# Patient Record
Sex: FEMALE
[De-identification: names, ages, dates, MRNs, and addresses within clinical notes are randomized per-mention and may not be internally consistent; named-entity substitution may affect disease eponyms.]

---

## 2018-08-21 ENCOUNTER — BH HISTORICAL (OUTPATIENT)
Dept: OTHER | Age: 53
End: 2018-08-21

## 2018-08-26 ENCOUNTER — BH HISTORICAL (OUTPATIENT)
Dept: OTHER | Age: 53
End: 2018-08-26

## 2018-08-30 ENCOUNTER — BH HISTORICAL (OUTPATIENT)
Dept: OTHER | Age: 53
End: 2018-08-30

## 2018-09-17 ENCOUNTER — BH HISTORICAL (OUTPATIENT)
Dept: OTHER | Age: 53
End: 2018-09-17

## 2018-10-11 PROBLEM — K76.0 FATTY LIVER: Status: ACTIVE | Noted: 2017-06-15

## 2018-12-10 PROCEDURE — 85705 THROMBOPLASTIN INHIBITION: CPT | Performed by: FAMILY MEDICINE

## 2018-12-10 PROCEDURE — 85732 THROMBOPLASTIN TIME PARTIAL: CPT | Performed by: FAMILY MEDICINE

## 2018-12-10 PROCEDURE — 85610 PROTHROMBIN TIME: CPT | Performed by: FAMILY MEDICINE

## 2018-12-10 PROCEDURE — 85613 RUSSELL VIPER VENOM DILUTED: CPT | Performed by: FAMILY MEDICINE

## 2019-10-01 PROCEDURE — 86803 HEPATITIS C AB TEST: CPT | Performed by: FAMILY MEDICINE

## 2020-10-05 PROBLEM — F42.2 MIXED OBSESSIONAL THOUGHTS AND ACTS: Status: ACTIVE | Noted: 2020-10-05

## 2020-10-05 PROBLEM — F33.1 MODERATE EPISODE OF RECURRENT MAJOR DEPRESSIVE DISORDER (HCC): Status: ACTIVE | Noted: 2020-10-05

## 2021-02-08 PROBLEM — F42.9 OBSESSIVE-COMPULSIVE DISORDER: Status: ACTIVE | Noted: 2021-02-08

## 2022-02-10 PROBLEM — E11.59 TYPE 2 DIABETES MELLITUS WITH OTHER CIRCULATORY COMPLICATION, WITHOUT LONG-TERM CURRENT USE OF INSULIN (HCC): Status: ACTIVE | Noted: 2022-02-10

## 2024-05-20 ENCOUNTER — OFFICE VISIT (OUTPATIENT)
Dept: HEMATOLOGY/ONCOLOGY | Age: 59
End: 2024-05-20
Attending: INTERNAL MEDICINE

## 2024-05-20 VITALS
BODY MASS INDEX: 29.9 KG/M2 | WEIGHT: 166.63 LBS | SYSTOLIC BLOOD PRESSURE: 141 MMHG | OXYGEN SATURATION: 97 % | HEIGHT: 62.68 IN | RESPIRATION RATE: 18 BRPM | DIASTOLIC BLOOD PRESSURE: 85 MMHG | TEMPERATURE: 98 F | HEART RATE: 84 BPM

## 2024-05-20 DIAGNOSIS — E61.1 IRON DEFICIENCY: ICD-10-CM

## 2024-05-20 DIAGNOSIS — T50.905A ADVERSE EFFECT OF DRUG, INITIAL ENCOUNTER: ICD-10-CM

## 2024-05-20 DIAGNOSIS — C92.10 CML (CHRONIC MYELOID LEUKEMIA) (HCC): Primary | ICD-10-CM

## 2024-05-20 LAB
ALBUMIN SERPL-MCNC: 4.2 G/DL (ref 3.4–5)
ALBUMIN/GLOB SERPL: 1.1 {RATIO} (ref 1–2)
ALP LIVER SERPL-CCNC: 90 U/L
ALT SERPL-CCNC: 55 U/L
ANION GAP SERPL CALC-SCNC: 3 MMOL/L (ref 0–18)
AST SERPL-CCNC: 44 U/L (ref 15–37)
BASOPHILS # BLD AUTO: 0.05 X10(3) UL (ref 0–0.2)
BASOPHILS NFR BLD AUTO: 0.5 %
BILIRUB SERPL-MCNC: 0.9 MG/DL (ref 0.1–2)
BUN BLD-MCNC: 21 MG/DL (ref 9–23)
CALCIUM BLD-MCNC: 10.1 MG/DL (ref 8.5–10.1)
CHLORIDE SERPL-SCNC: 90 MMOL/L (ref 98–112)
CO2 SERPL-SCNC: 31 MMOL/L (ref 21–32)
CREAT BLD-MCNC: 0.76 MG/DL
DEPRECATED HBV CORE AB SER IA-ACNC: 66.4 NG/ML
EGFRCR SERPLBLD CKD-EPI 2021: 90 ML/MIN/1.73M2 (ref 60–?)
EOSINOPHIL # BLD AUTO: 0.18 X10(3) UL (ref 0–0.7)
EOSINOPHIL NFR BLD AUTO: 2 %
ERYTHROCYTE [DISTWIDTH] IN BLOOD BY AUTOMATED COUNT: 14.3 %
FASTING STATUS PATIENT QL REPORTED: NO
GLOBULIN PLAS-MCNC: 3.9 G/DL (ref 2.8–4.4)
GLUCOSE BLD-MCNC: 109 MG/DL (ref 70–99)
HCT VFR BLD AUTO: 48.9 %
HGB BLD-MCNC: 16.7 G/DL
IMM GRANULOCYTES # BLD AUTO: 0.03 X10(3) UL (ref 0–1)
IMM GRANULOCYTES NFR BLD: 0.3 %
IRON SATN MFR SERPL: 39 %
IRON SERPL-MCNC: 189 UG/DL
LDH SERPL L TO P-CCNC: 198 U/L
LYMPHOCYTES # BLD AUTO: 1.66 X10(3) UL (ref 1–4)
LYMPHOCYTES NFR BLD AUTO: 18.1 %
MCH RBC QN AUTO: 30.9 PG (ref 26–34)
MCHC RBC AUTO-ENTMCNC: 34.2 G/DL (ref 31–37)
MCV RBC AUTO: 90.4 FL
MONOCYTES # BLD AUTO: 0.72 X10(3) UL (ref 0.1–1)
MONOCYTES NFR BLD AUTO: 7.9 %
NEUTROPHILS # BLD AUTO: 6.53 X10 (3) UL (ref 1.5–7.7)
NEUTROPHILS # BLD AUTO: 6.53 X10(3) UL (ref 1.5–7.7)
NEUTROPHILS NFR BLD AUTO: 71.2 %
OSMOLALITY SERPL CALC.SUM OF ELEC: 262 MOSM/KG (ref 275–295)
PLATELET # BLD AUTO: 315 10(3)UL (ref 150–450)
POTASSIUM SERPL-SCNC: 5.1 MMOL/L (ref 3.5–5.1)
PROT SERPL-MCNC: 8.1 G/DL (ref 6.4–8.2)
RBC # BLD AUTO: 5.41 X10(6)UL
SODIUM SERPL-SCNC: 124 MMOL/L (ref 136–145)
TIBC SERPL-MCNC: 486 UG/DL (ref 240–450)
TRANSFERRIN SERPL-MCNC: 326 MG/DL (ref 200–360)
WBC # BLD AUTO: 9.2 X10(3) UL (ref 4–11)

## 2024-05-20 PROCEDURE — 99215 OFFICE O/P EST HI 40 MIN: CPT | Performed by: INTERNAL MEDICINE

## 2024-05-20 RX ORDER — CARVEDILOL 3.12 MG/1
3.12 TABLET ORAL 2 TIMES DAILY WITH MEALS
COMMUNITY
Start: 2024-03-23

## 2024-05-20 RX ORDER — SPIRONOLACTONE AND HYDROCHLOROTHIAZIDE 25; 25 MG/1; MG/1
1 TABLET ORAL
COMMUNITY
Start: 2024-05-08

## 2024-05-20 RX ORDER — ASPIRIN 81 MG/1
81 TABLET ORAL DAILY
COMMUNITY

## 2024-05-20 RX ORDER — ATORVASTATIN CALCIUM 80 MG/1
80 TABLET, FILM COATED ORAL NIGHTLY
COMMUNITY
Start: 2024-03-02

## 2024-05-20 RX ORDER — HYDRALAZINE HYDROCHLORIDE 25 MG/1
TABLET, FILM COATED ORAL
COMMUNITY
Start: 2024-04-09

## 2024-05-20 RX ORDER — FLUOXETINE 20 MG/1
20 TABLET, FILM COATED ORAL DAILY
COMMUNITY
Start: 2024-05-12

## 2024-05-20 NOTE — PROGRESS NOTES
Education Record    Learner:  Patient/ spouse    Disease / Diagnosis: CML    Barriers / Limitations:  None   Comments:    Method:  Discussion   Comments:    General Topics:  Plan of care reviewed   Comments:    Outcome:  Shows understanding   Comments:   Pt dx in 2021 at [facility]. MD retired.   Was started on Bosutinib, and in remission within 9 month, stopped treatment 11/2023 due to arrhthymias.  Cardiology has said her heart is fine.   Has not resumed treatment.

## 2024-05-20 NOTE — CONSULTS
Cancer Center Report of Consultation    Patient Name: Dyana Souza   YOB: 1965   Medical Record Number: [id number]   CSN: [id number]   Consulting Physician: Ryann Ordonez MD  Referring Physician(s): No ref. provider found  Date of Consultation: 5/20/2024     Reason for Consultation:  Dyana Souza was seen today in the Cancer Center for the diagnosis of CLL.    History of Present Illness:     59 year old previously followed at [facility] for CLL, initially diagnosed in 2021.  Referral blood showed 62.49% BCR/ABL transcripts.  Started bosutinib at that time and achieved a major molecular response in 9/22.  In 11/23, she was found to have bradycardia and she stopped the bosutinib.  The bradycardia was attributed to Coreg.  She also had some a flutter.  Since then she has seen EP.  Holter test was okay.  She was admitted in 3/24 for control blood pressure.  She has been off bosutinib since then.      Past Medical History:  Past Medical History:    Achalasia    Anxiety    Back pain    Chronic fatigue syndrome    Completed stroke (HCC)    Coronary atherosclerosis of native coronary artery    Overview:  Cath by DANIEL Gregg 06/13/2007 for R/S CP with radiation to her back and jaw.  RCA stent (done in [place]) . Tubular LAD/Diag stent of 80% was treated with kissing balloon. LVEF 50%.   CABG 07/30/2007 had CABG x4 by Dr JAYLYN Maher. KIMBLE-LAD, SVG-OM, SVG-PDA and SVG-Diag. Admitted with atypical CP Jul 2015, Trop (-), ECHO LVEF  55% w/o WMA, MPI done Jan 2015 negative for ischemia.       Degenerative joint disease (DJD) of lumbar spine    Diabetes mellitus (HCC)    Edema    Essential hypertension    Fibromyalgia    GERD (gastroesophageal reflux disease)    Insomnia    Muscle spasm    Obesity    Obstructive sleep apnea    Old myocardial infarction    Snoring       Past Surgical History:  Past Surgical History:   Procedure Laterality Date    Cabg, arterial, four+      2007    Cholecystectomy      Hysterectomy      partial,  not due to cancer    Oophorectomy Right     noncancer,       Family Medical History:  Family History   Problem Relation Age of Onset    Stroke Brother     Other (lupus anticoagulant) Brother     Other (malignant neoplasm of pancrease) Paternal Grandfather        Psychosocial History:  Social History     Socioeconomic History    Marital status: OTHER     Spouse name: Not on file    Number of children: Not on file    Years of education: Not on file    Highest education level: Not on file   Occupational History    Not on file   Tobacco Use    Smoking status: Former     Current packs/day: 0.00     Types: Cigarettes     Quit date:      Years since quittin.3    Smokeless tobacco: Never   Vaping Use    Vaping status: Never Used   Substance and Sexual Activity    Alcohol use: No    Drug use: No    Sexual activity: Not on file   Other Topics Concern    Not on file   Social History Narrative    Not on file     Social Determinants of Health     Financial Resource Strain: Not on file   Food Insecurity: Not on file   Transportation Needs: Not on file   Physical Activity: Not on file   Stress: Not on file   Social Connections: Not on file   Housing Stability: Not on file     , homemaker, no children.  1 dog.    Allergies:   Allergies   Allergen Reactions    Amlodipine ANAPHYLAXIS    Clopidogrel WHEEZING and RASH    Metoclopramide HIVES and OTHER (SEE COMMENTS)     Cerner Allergy Text Annotation: Reglan, Cerner Reaction: hyper  Cerner Allergy Text Annotation: Reglan, Cerner Reaction: hyper      Mirtazapine Coughing, PALPITATIONS, Runny nose and SWELLING    Tramadol UNKNOWN    Aspirin ITCHING    Ciprofloxacin NAUSEA AND VOMITING and OTHER (SEE COMMENTS)     Cerner Allergy Text Annotation: ciprofloxacin, Cerner Reaction: vomit      Niacin And Related UNKNOWN     Other reaction(s): Rash    Opioid Analgesics OTHER (SEE COMMENTS), UNKNOWN and NAUSEA AND VOMITING     Per pt  Cerner Allergy Text Annotation: morphine,  Cerner Reaction: chest pain      Sulfamethoxazole W/Trimethoprim NAUSEA AND VOMITING    Amoxicillin-Pot Clavulanate OTHER (SEE COMMENTS)     Upset her stomach with Augmentin.  Patient took amoxicillin without problem.    Gabapentin OTHER (SEE COMMENTS)    Sertraline UNKNOWN     Cerner Allergy Text Annotation: Zoloft      Trazodone UNKNOWN    Sulfa Antibiotics OTHER (SEE COMMENTS) and RASH     Cerner Allergy Text Annotation: sulfa drugs, Cerner Reaction: hives         Current Medications:    Current Outpatient Medications:     FLUoxetine HCl 20 MG Oral Tab, Take 1 tablet (20 mg total) by mouth daily., Disp: , Rfl:     atorvastatin 80 MG Oral Tab, Take 1 tablet (80 mg total) by mouth nightly., Disp: , Rfl:     carvedilol 3.125 MG Oral Tab, Take 1 tablet (3.125 mg total) by mouth 2 (two) times daily with meals., Disp: , Rfl:     hydrALAZINE 25 MG Oral Tab, TAKE 3 TABLETS BY MOUTH EVERY 12 HOURS. INDICATIONS: HIGH BLOOD PRESSURE, Disp: , Rfl:     Spironolactone-HCTZ 25-25 MG Oral Tab, Take 1 tablet by mouth daily with breakfast., Disp: , Rfl:     DULoxetine 60 MG Oral Cap DR Particles, Take 1 capsule (60 mg total) by mouth daily., Disp: 90 capsule, Rfl: 1    prochlorperazine (COMPAZINE) 10 mg tablet, Take 1 tablet (10 mg total) by mouth every 6 (six) hours as needed., Disp: , Rfl:     Famotidine (PEPCID AC OR), Take 25 mg by mouth as needed., Disp: , Rfl:     NON FORMULARY, Medical cannabis as needed., Disp: , Rfl:     zolpidem 10 MG Oral Tab, Take 1 tablet (10 mg total) by mouth daily., Disp: 60 tablet, Rfl: 0    HYDROcodone-acetaminophen 5-325 MG Oral Tab, Take 1-2 tablets by mouth 3 (three) times daily as needed., Disp: 30 tablet, Rfl: 0    clonazePAM 1 MG Oral Tab, Take 1 tablet (1 mg total) by mouth 2 (two) times daily., Disp: 60 tablet, Rfl: 1    lisinopril 20 MG Oral Tab, Take 0.5 tablets (10 mg total) by mouth 2 (two) times daily., Disp: , Rfl: 3    Mometasone Furoate 0.1 % External Cream, Apply 1 Application  topically daily., Disp: 60 g, Rfl: 3    POTASSIUM CHLORIDE ER 20 MEQ Oral Tab CR, TAKE 1 TABLET BY MOUTH DAILY, Disp: 90 tablet, Rfl: 11    Triamcinolone Acetonide (NASACORT ALLERGY 24HR) 55 MCG/ACT Nasal Aerosol, 2 sprays in each nostril every day, Disp: 16.5 g, Rfl: 0    BOSULIF 400 MG Oral Tab, Take 400 mg by mouth daily. (Patient not taking: Reported on 5/20/2024), Disp: , Rfl:     Review of Systems:    Constitutional: No chills, fevers, malaise, night sweats and weight loss.   Eyes: No visual disturbance, irritation and redness.  Ears, nose, mouth, throat, and face: No hearing loss, tinnitus, hoarseness and voice change.  Respiratory: No cough, sputum, hemoptysis, chest pain, wheezing, dyspnea on exertion  Cardiovascular: No chest pain, palpitations, syncope,orthopnea, PND, edema  Gastrointestinal:No dysphagia, odynophagia, nausea, vomiting, diarrhea, abdominal pain.  Derm: No rash, skin lesions, and pruritus.  Hematologic/lymphatic: No easy bruising, bleeding, and lymphadenopathy.  Musculoskeletal: No myalgias, arthralgias, muscle weakness.  Neurological: No headaches, dizziness, seizures, speech problems, gait problems   Psych: No anxiety/depression.    Vital Signs:  /85 (BP Location: Left arm, Patient Position: Sitting, Cuff Size: adult)   Pulse 84   Temp 98.4 °F (36.9 °C) (Tympanic)   Resp 18   Ht 1.592 m (5' 2.68\")   Wt 75.6 kg (166 lb 9.6 oz)   LMP  (LMP Unknown)   SpO2 97%   BMI 29.82 kg/m²     ECOG PS: 1    Physical Examination:    General: Patient is alert and oriented x 3, not in acute distress.  HEENT: EOMs intact. PERRL. Oropharynx is clear.   Neck: No JVD. No palpable lymphadenopathy. Neck is supple.  Lymphatics: There is no palpable lymphadenopathy  in the cervical, axillary, or inguinal regions.  Chest: Clear to auscultation.  Heart: Regular rate and rhythm. S1S2 normal.  Abdomen: Soft, non tender with good bowel sounds.  No hepatosplenomegaly/mass.    Extremities: Pedal pulses are  present. No edema.  Neurological: Grossly intact.      Labs:         Assessment and Plan:    # CML: Diag 6/21.  62.49% BCR/ABL transcripts and blood.  Started bosutinib and had a very good response.  Achieved major molecular response in 9/22.  Self discontinued bosutinib in 11/23 and she had bradycardia and a flutter.  Since then she has met with cardiology.  Thought to have sick sinus syndrome.  Holter test was apparently okay.  Bradycardia was attributed to carvedilol which has been discontinued.  She is willing to start now.  Recommend baseline labs including BCR/ABL.  She has bosutinib at home.  Will order.  If she has side effects, can consider dose reduction.  Discussed that she is a candidate for indefinite treatment.  She was off treatment since 11/23 and BCR ABL was positive in 1/24.        Procedures    CBC With Differential With Platelet    Comp Metabolic Panel (14)    Iron And Tibc    LDH    Monoclonal Protein Study    BCR-ABL1, P210 Gene Rearrangement, Quantitative PCR Panel    FERRITIN [E]    CBC With Differential With Platelet    Comp Metabolic Panel (14)    Iron And Tibc    Ferritin    LDH    Uric Acid    BCR-ABL1, P210 Gene Rearrangement, Quantitative PCR Panel    SERUM MONOCLONAL PROTEIN STUDY    BCR-ABL1, P210 Gene Rearrangement, Quantitative PCR    CBC w/Auto Diff     Return in about 3 months (around 8/20/2024) for Labs, MD visit.      Janene Ordonez M.D.    [facility]    [address] [place], [address]    5/20/2024    1:48 PM

## 2024-05-22 ENCOUNTER — TELEPHONE (OUTPATIENT)
Dept: HEMATOLOGY/ONCOLOGY | Facility: HOSPITAL | Age: 59
End: 2024-05-22

## 2024-05-22 LAB
ALBUMIN SERPL ELPH-MCNC: 4.49 G/DL (ref 3.75–5.21)
ALBUMIN/GLOB SERPL: 1.49 {RATIO} (ref 1–2)
ALPHA1 GLOB SERPL ELPH-MCNC: 0.26 G/DL (ref 0.19–0.46)
ALPHA2 GLOB SERPL ELPH-MCNC: 0.99 G/DL (ref 0.48–1.05)
B-GLOBULIN SERPL ELPH-MCNC: 1 G/DL (ref 0.68–1.23)
GAMMA GLOB SERPL ELPH-MCNC: 0.77 G/DL (ref 0.62–1.7)
KAPPA LC FREE SER-MCNC: 1.95 MG/DL (ref 0.33–1.94)
KAPPA LC FREE/LAMBDA FREE SER NEPH: 1.38 {RATIO} (ref 0.26–1.65)
LAMBDA LC FREE SERPL-MCNC: 1.42 MG/DL (ref 0.57–2.63)
PROT SERPL-MCNC: 7.5 G/DL (ref 5.7–8.2)

## 2024-05-22 NOTE — TELEPHONE ENCOUNTER
Pt called about low BP-90/35. Just restarted Bosutinib 400 mg/ Not on Hydralazine or Spironolactone/hydrochlorothiazide. Cardiology increased her Lisinopril to 40 mg bid after her mini CVA. Yold pt to hold Bosutinib, increase fluids, eat something salty. Hold Lisinopril tomorrow (took 2 doses already today.) Call with BP update Fri and we can advise on Bosutinib then. She will prob have to contact cardiology to get meds adjusted.

## 2024-05-23 DIAGNOSIS — C92.10 CML (CHRONIC MYELOID LEUKEMIA) (HCC): ICD-10-CM

## 2024-05-24 LAB
BCR-ABL1 INTERPRETATION: DETECTED
NS MOLECULAR RESPONSE VALUE: 2.77

## 2024-05-29 DIAGNOSIS — I10 ESSENTIAL HYPERTENSION: Primary | ICD-10-CM

## 2024-05-29 RX ORDER — LISINOPRIL 20 MG/1
20 TABLET ORAL 2 TIMES DAILY
Qty: 60 TABLET | Refills: 0 | Status: SHIPPED | OUTPATIENT
Start: 2024-05-29

## 2024-06-14 ENCOUNTER — SOCIAL WORK SERVICES (OUTPATIENT)
Dept: HEMATOLOGY/ONCOLOGY | Facility: HOSPITAL | Age: 59
End: 2024-06-14

## 2024-06-14 NOTE — PROGRESS NOTES
Pt sent a "[employer]hart message expressing concerns of distress.     SW left a vm for pt ([phone]) discussing resources available to her. SW also sent a [employer] message to provide resources and SW contact information. SW provided cancer support resources including [facility]. The packet of resources included information on transportation resources, homecare/home health agencies, Facebook support group page and counseling resources. SW explained role of SW in Cancer Center and social work contact information.    Silvina Gutierrez [facility] [place] [address]  Ph: [phone] [email or web address]  Fax: [phone]

## 2024-08-05 ENCOUNTER — NURSE ONLY (OUTPATIENT)
Dept: HEMATOLOGY/ONCOLOGY | Age: 59
End: 2024-08-05
Attending: INTERNAL MEDICINE
Payer: MEDICARE

## 2024-08-05 DIAGNOSIS — C92.10 CML (CHRONIC MYELOID LEUKEMIA) (HCC): ICD-10-CM

## 2024-08-05 DIAGNOSIS — E61.1 IRON DEFICIENCY: ICD-10-CM

## 2024-08-05 LAB
ALBUMIN SERPL-MCNC: 3.3 G/DL (ref 3.4–5)
ALBUMIN/GLOB SERPL: 1 {RATIO} (ref 1–2)
ALP LIVER SERPL-CCNC: 97 U/L
ALT SERPL-CCNC: 59 U/L
ANION GAP SERPL CALC-SCNC: 6 MMOL/L (ref 0–18)
AST SERPL-CCNC: 43 U/L (ref 15–37)
BASOPHILS # BLD AUTO: 0.02 X10(3) UL (ref 0–0.2)
BASOPHILS NFR BLD AUTO: 0.3 %
BILIRUB SERPL-MCNC: 0.5 MG/DL (ref 0.1–2)
BUN BLD-MCNC: 10 MG/DL (ref 9–23)
CALCIUM BLD-MCNC: 9 MG/DL (ref 8.5–10.1)
CHLORIDE SERPL-SCNC: 103 MMOL/L (ref 98–112)
CO2 SERPL-SCNC: 26 MMOL/L (ref 21–32)
CREAT BLD-MCNC: 0.68 MG/DL
DEPRECATED HBV CORE AB SER IA-ACNC: 21.4 NG/ML
EGFRCR SERPLBLD CKD-EPI 2021: 100 ML/MIN/1.73M2 (ref 60–?)
EOSINOPHIL # BLD AUTO: 0.27 X10(3) UL (ref 0–0.7)
EOSINOPHIL NFR BLD AUTO: 3.6 %
ERYTHROCYTE [DISTWIDTH] IN BLOOD BY AUTOMATED COUNT: 14.2 %
GLOBULIN PLAS-MCNC: 3.4 G/DL (ref 2.8–4.4)
GLUCOSE BLD-MCNC: 106 MG/DL (ref 70–99)
HCT VFR BLD AUTO: 39.2 %
HGB BLD-MCNC: 13.3 G/DL
IMM GRANULOCYTES # BLD AUTO: 0.02 X10(3) UL (ref 0–1)
IMM GRANULOCYTES NFR BLD: 0.3 %
IRON SATN MFR SERPL: 14 %
IRON SERPL-MCNC: 46 UG/DL
LDH SERPL L TO P-CCNC: 228 U/L
LYMPHOCYTES # BLD AUTO: 0.82 X10(3) UL (ref 1–4)
LYMPHOCYTES NFR BLD AUTO: 11.1 %
MCH RBC QN AUTO: 31.4 PG (ref 26–34)
MCHC RBC AUTO-ENTMCNC: 33.9 G/DL (ref 31–37)
MCV RBC AUTO: 92.7 FL
MONOCYTES # BLD AUTO: 0.72 X10(3) UL (ref 0.1–1)
MONOCYTES NFR BLD AUTO: 9.7 %
NEUTROPHILS # BLD AUTO: 5.55 X10 (3) UL (ref 1.5–7.7)
NEUTROPHILS # BLD AUTO: 5.55 X10(3) UL (ref 1.5–7.7)
NEUTROPHILS NFR BLD AUTO: 75 %
OSMOLALITY SERPL CALC.SUM OF ELEC: 279 MOSM/KG (ref 275–295)
PLATELET # BLD AUTO: 226 10(3)UL (ref 150–450)
POTASSIUM SERPL-SCNC: 3.7 MMOL/L (ref 3.5–5.1)
PROT SERPL-MCNC: 6.7 G/DL (ref 6.4–8.2)
RBC # BLD AUTO: 4.23 X10(6)UL
SODIUM SERPL-SCNC: 135 MMOL/L (ref 136–145)
TOTAL IRON BINDING CAPACITY: 327 UG/DL (ref 250–425)
TRANSFERRIN SERPL-MCNC: 235 MG/DL (ref 250–380)
URATE SERPL-MCNC: 4 MG/DL
WBC # BLD AUTO: 7.4 X10(3) UL (ref 4–11)

## 2024-08-05 PROCEDURE — 84550 ASSAY OF BLOOD/URIC ACID: CPT

## 2024-08-05 PROCEDURE — 36415 COLL VENOUS BLD VENIPUNCTURE: CPT

## 2024-08-05 PROCEDURE — 80053 COMPREHEN METABOLIC PANEL: CPT

## 2024-08-05 PROCEDURE — 83550 IRON BINDING TEST: CPT

## 2024-08-05 PROCEDURE — 83615 LACTATE (LD) (LDH) ENZYME: CPT

## 2024-08-05 PROCEDURE — 83540 ASSAY OF IRON: CPT

## 2024-08-05 PROCEDURE — 82728 ASSAY OF FERRITIN: CPT

## 2024-08-05 PROCEDURE — 85025 COMPLETE CBC W/AUTO DIFF WBC: CPT

## 2024-08-13 ENCOUNTER — HOSPITAL ENCOUNTER (EMERGENCY)
Age: 59
Discharge: HOME | End: 2024-08-13
Attending: STUDENT IN AN ORGANIZED HEALTH CARE EDUCATION/TRAINING PROGRAM
Payer: MEDICARE

## 2024-08-13 ENCOUNTER — TELEPHONE (OUTPATIENT)
Dept: HEMATOLOGY/ONCOLOGY | Facility: HOSPITAL | Age: 59
End: 2024-08-13

## 2024-08-13 VITALS
HEIGHT: 63 IN | WEIGHT: 180 LBS | RESPIRATION RATE: 18 BRPM | SYSTOLIC BLOOD PRESSURE: 157 MMHG | TEMPERATURE: 99 F | BODY MASS INDEX: 31.89 KG/M2 | HEART RATE: 75 BPM | DIASTOLIC BLOOD PRESSURE: 77 MMHG | OXYGEN SATURATION: 97 %

## 2024-08-13 DIAGNOSIS — I10 HYPERTENSION, UNSPECIFIED TYPE: Primary | ICD-10-CM

## 2024-08-13 DIAGNOSIS — R19.7 DIARRHEA, UNSPECIFIED TYPE: ICD-10-CM

## 2024-08-13 LAB
ALBUMIN SERPL-MCNC: 3.4 G/DL (ref 3.4–5)
ALBUMIN/GLOB SERPL: 0.9 {RATIO} (ref 1–2)
ALP LIVER SERPL-CCNC: 105 U/L
ALT SERPL-CCNC: 58 U/L
ANION GAP SERPL CALC-SCNC: 6 MMOL/L (ref 0–18)
AST SERPL-CCNC: 38 U/L (ref 15–37)
ATRIAL RATE: 72 BPM
BASOPHILS # BLD AUTO: 0.01 X10(3) UL (ref 0–0.2)
BASOPHILS NFR BLD AUTO: 0.1 %
BILIRUB SERPL-MCNC: 0.6 MG/DL (ref 0.1–2)
BILIRUB UR QL STRIP.AUTO: NEGATIVE
BUN BLD-MCNC: 6 MG/DL (ref 9–23)
CALCIUM BLD-MCNC: 9.2 MG/DL (ref 8.5–10.1)
CHLORIDE SERPL-SCNC: 105 MMOL/L (ref 98–112)
CLARITY UR REFRACT.AUTO: CLEAR
CO2 SERPL-SCNC: 23 MMOL/L (ref 21–32)
COLOR UR AUTO: YELLOW
CREAT BLD-MCNC: 0.65 MG/DL
EGFRCR SERPLBLD CKD-EPI 2021: 101 ML/MIN/1.73M2 (ref 60–?)
EOSINOPHIL # BLD AUTO: 0.15 X10(3) UL (ref 0–0.7)
EOSINOPHIL NFR BLD AUTO: 2.1 %
ERYTHROCYTE [DISTWIDTH] IN BLOOD BY AUTOMATED COUNT: 13.9 %
GLOBULIN PLAS-MCNC: 3.8 G/DL (ref 2.8–4.4)
GLUCOSE BLD-MCNC: 101 MG/DL (ref 70–99)
GLUCOSE UR STRIP.AUTO-MCNC: NEGATIVE MG/DL
HCT VFR BLD AUTO: 41.2 %
HGB BLD-MCNC: 13.7 G/DL
IMM GRANULOCYTES # BLD AUTO: 0.02 X10(3) UL (ref 0–1)
IMM GRANULOCYTES NFR BLD: 0.3 %
KETONES UR STRIP.AUTO-MCNC: NEGATIVE MG/DL
LEUKOCYTE ESTERASE UR QL STRIP.AUTO: NEGATIVE
LYMPHOCYTES # BLD AUTO: 0.52 X10(3) UL (ref 1–4)
LYMPHOCYTES NFR BLD AUTO: 7.3 %
MCH RBC QN AUTO: 30.9 PG (ref 26–34)
MCHC RBC AUTO-ENTMCNC: 33.3 G/DL (ref 31–37)
MCV RBC AUTO: 92.8 FL
MONOCYTES # BLD AUTO: 0.79 X10(3) UL (ref 0.1–1)
MONOCYTES NFR BLD AUTO: 11.1 %
NEUTROPHILS # BLD AUTO: 5.63 X10 (3) UL (ref 1.5–7.7)
NEUTROPHILS # BLD AUTO: 5.63 X10(3) UL (ref 1.5–7.7)
NEUTROPHILS NFR BLD AUTO: 79.1 %
NITRITE UR QL STRIP.AUTO: NEGATIVE
OSMOLALITY SERPL CALC.SUM OF ELEC: 276 MOSM/KG (ref 275–295)
P AXIS: 70 DEGREES
P-R INTERVAL: 142 MS
PH UR STRIP.AUTO: 6 [PH] (ref 5–8)
PLATELET # BLD AUTO: 216 10(3)UL (ref 150–450)
POTASSIUM SERPL-SCNC: 4.1 MMOL/L (ref 3.5–5.1)
PROT SERPL-MCNC: 7.2 G/DL (ref 6.4–8.2)
PROT UR STRIP.AUTO-MCNC: NEGATIVE MG/DL
Q-T INTERVAL: 424 MS
QRS DURATION: 92 MS
QTC CALCULATION (BEZET): 464 MS
R AXIS: 62 DEGREES
RBC # BLD AUTO: 4.44 X10(6)UL
RBC UR QL AUTO: NEGATIVE
SARS-COV-2 RNA RESP QL NAA+PROBE: NOT DETECTED
SODIUM SERPL-SCNC: 134 MMOL/L (ref 136–145)
SP GR UR STRIP.AUTO: 1.01 (ref 1–1.03)
T AXIS: 42 DEGREES
UROBILINOGEN UR STRIP.AUTO-MCNC: 0.2 MG/DL
VENTRICULAR RATE: 72 BPM
WBC # BLD AUTO: 7.1 X10(3) UL (ref 4–11)

## 2024-08-13 PROCEDURE — 80053 COMPREHEN METABOLIC PANEL: CPT | Performed by: STUDENT IN AN ORGANIZED HEALTH CARE EDUCATION/TRAINING PROGRAM

## 2024-08-13 PROCEDURE — 99285 EMERGENCY DEPT VISIT HI MDM: CPT

## 2024-08-13 PROCEDURE — 99284 EMERGENCY DEPT VISIT MOD MDM: CPT

## 2024-08-13 PROCEDURE — 81003 URINALYSIS AUTO W/O SCOPE: CPT | Performed by: STUDENT IN AN ORGANIZED HEALTH CARE EDUCATION/TRAINING PROGRAM

## 2024-08-13 PROCEDURE — 85025 COMPLETE CBC W/AUTO DIFF WBC: CPT | Performed by: STUDENT IN AN ORGANIZED HEALTH CARE EDUCATION/TRAINING PROGRAM

## 2024-08-13 PROCEDURE — 87507 IADNA-DNA/RNA PROBE TQ 12-25: CPT | Performed by: STUDENT IN AN ORGANIZED HEALTH CARE EDUCATION/TRAINING PROGRAM

## 2024-08-13 PROCEDURE — 87493 C DIFF AMPLIFIED PROBE: CPT | Performed by: STUDENT IN AN ORGANIZED HEALTH CARE EDUCATION/TRAINING PROGRAM

## 2024-08-13 PROCEDURE — 93005 ELECTROCARDIOGRAM TRACING: CPT

## 2024-08-13 PROCEDURE — 93010 ELECTROCARDIOGRAM REPORT: CPT

## 2024-08-13 PROCEDURE — 96375 TX/PRO/DX INJ NEW DRUG ADDON: CPT

## 2024-08-13 PROCEDURE — 96374 THER/PROPH/DIAG INJ IV PUSH: CPT

## 2024-08-13 PROCEDURE — 96376 TX/PRO/DX INJ SAME DRUG ADON: CPT

## 2024-08-13 PROCEDURE — 96361 HYDRATE IV INFUSION ADD-ON: CPT

## 2024-08-13 RX ORDER — DICYCLOMINE HCL 20 MG
20 TABLET ORAL ONCE
Status: COMPLETED | OUTPATIENT
Start: 2024-08-13 | End: 2024-08-13

## 2024-08-13 RX ORDER — ONDANSETRON 4 MG/1
4 TABLET, ORALLY DISINTEGRATING ORAL EVERY 4 HOURS PRN
Qty: 10 TABLET | Refills: 0 | Status: SHIPPED | OUTPATIENT
Start: 2024-08-13 | End: 2024-08-20

## 2024-08-13 RX ORDER — DICYCLOMINE HCL 20 MG
20 TABLET ORAL 4 TIMES DAILY PRN
Qty: 30 TABLET | Refills: 0 | Status: SHIPPED | OUTPATIENT
Start: 2024-08-13 | End: 2024-09-12

## 2024-08-13 RX ORDER — HYDRALAZINE HYDROCHLORIDE 20 MG/ML
5 INJECTION INTRAMUSCULAR; INTRAVENOUS ONCE
Status: COMPLETED | OUTPATIENT
Start: 2024-08-13 | End: 2024-08-13

## 2024-08-13 RX ORDER — ONDANSETRON 2 MG/ML
4 INJECTION INTRAMUSCULAR; INTRAVENOUS ONCE
Status: COMPLETED | OUTPATIENT
Start: 2024-08-13 | End: 2024-08-13

## 2024-08-13 NOTE — ED PROVIDER NOTES
History     Chief Complaint   Patient presents with    Abdomen/Flank Pain    Blood Pressure       HPI    59 year old female with history of CLL on oral chemo presents with a couple days of abdominal cramps, bloating, nonbloody diarrhea and nausea.  She also has full body heat rash like sensation.  This happens sometimes for patient as a chemo reaction..  she has associated dull headache and fatigue. she has noted that her blood pressure has been elevated as well, she has hydralazine at home that she takes as needed if her blood pressure is elevated. No cp/sob.          Past Medical History:    Achalasia    Anxiety    Back pain    Chronic fatigue syndrome    Completed stroke (HCC)    Coronary atherosclerosis of native coronary artery    Overview:  Cath by DANIEL Gregg 2007 for R/S CP with radiation to her back and jaw.  RCA stent (done in [place]) . Tubular LAD/Diag stent of 80% was treated with kissing balloon. LVEF 50%.   CABG 2007 had CABG x4 by Dr JAYLYN Maher. KIMBLE-LAD, SVG-OM, SVG-PDA and SVG-Diag. Admitted with atypical CP 2015, Trop (-), ECHO LVEF  55% w/o WMA, MPI done 2015 negative for ischemia.       Degenerative joint disease (DJD) of lumbar spine    Diabetes mellitus (HCC)    Diverticulitis    Edema    Essential hypertension    Fibromyalgia    GERD (gastroesophageal reflux disease)    Insomnia    Leukemia (HCC)    Muscle spasm    Obesity    Obstructive sleep apnea    Old myocardial infarction    Snoring       Past Surgical History:   Procedure Laterality Date    Appendectomy      Cabg, arterial, four+          Cholecystectomy      Colonoscopy      Hysterectomy      partial, not due to cancer    Oophorectomy Right     noncancer,       Social History     Socioeconomic History    Marital status:    Tobacco Use    Smoking status: Former     Current packs/day: 0.00     Types: Cigarettes     Quit date:      Years since quittin.6    Smokeless tobacco: Never   Vaping Use    Vaping  status: Never Used   Substance and Sexual Activity    Alcohol use: No     Comment: occ    Drug use: Yes     Types: Cannabis     Social Determinants of Health     Food Insecurity: No Food Insecurity (2/29/2024)    Received from [facility]    Food Insecurity     Currently or in the past 3 months, have you worried your food would run out before you had money to buy more?: No     In the past 12 months, have you run out of food or been unable to get more?: No   Transportation Needs: No Transportation Needs (2/29/2024)    Received from [facility]    Transportation Needs     Medical Transportation Needs?: No    Received from [facility]    Social Connections    Received from [facility]    Housing Stability                   Physical Exam     ED Triage Vitals [08/13/24 1316]   BP (!) 177/72   Pulse 71   Resp 18   Temp 98.5 °F (36.9 °C)   Temp src Temporal   SpO2 96 %   O2 Device None (Room air)       Physical Exam  Constitutional:       General: She is not in acute distress.  Eyes:      Extraocular Movements: Extraocular movements intact.   Cardiovascular:      Rate and Rhythm: Normal rate.      Pulses: Normal pulses.   Pulmonary:      Effort: Pulmonary effort is normal. No respiratory distress.   Abdominal:      General: Abdomen is flat.      Palpations: Abdomen is soft.      Tenderness: There is no abdominal tenderness.   Musculoskeletal:      Cervical back: Normal range of motion.   Neurological:      General: No focal deficit present.      Mental Status: She is alert.              ED Course     Labs Reviewed   COMP METABOLIC PANEL (14) - Abnormal; Notable for the following components:       Result Value    Glucose 101 (*)     Sodium 134 (*)     BUN 6 (*)     ALT 58 (*)     A/G Ratio 0.9 (*)     All other components within normal limits   CBC WITH DIFFERENTIAL WITH PLATELET - Abnormal; Notable for the following components:    Lymphocyte  Absolute 0.52 (*)     All other components within normal limits   REDRAW AST (SGOT) (P) - Abnormal; Notable for the following components:    AST 38 (*)     All other components within normal limits   REDRAW POTASSIUM (P) - Normal   RAPID SARS-COV-2 BY PCR - Normal   URINALYSIS WITH CULTURE REFLEX   GI STOOL PANEL BY PCR   C. DIFFICILE(TOXIGENIC)PCR     No results found.        MDM     Vitals:    08/13/24 1316 08/13/24 1420 08/13/24 1545 08/13/24 1715   BP: (!) 177/72 (!) 181/78 160/84 (!) 177/82   Pulse: 71 71 62 71   Resp: 18 15 17 16   Temp: 98.5 °F (36.9 °C)      TempSrc: Temporal      SpO2: 96% 97% 95% 95%   Weight: 81.6 kg      Height: 160 cm (5' 3\")          Possible side effects of chemotherapy, infectious colitis, dehydration, electro abnormalities.  Elevated blood pressure likely reactive to patient not feeling well, will check labs    ED Course as of 08/13/24 1726  ------------------------------------------------------------  Time: 08/13 1420  Comment: EKG interpretation by me: EKG sinus rhythm at a rate of 72, axis nomal, nonspec septal changes.  No prior to compare  ------------------------------------------------------------  Time: 08/13 1707  Comment: Covid Negative, labs otherwise with reassuring renal function, cell counts, minimally elevated LFTs     Patient with good response to medicines.  Will plan to give a prescription for Bentyl and Zofran.  Stool sample here sent, patient will be called with any positive results.  Blood pressure is elevated, patient has not taken her afternoon or evening dose of hydralazine, will give IV dose here and patient can continue her home medicines.  No signs of organ injury from her blood pressure.  I discussed that she will need to follow-up with her cardiologist regarding medicine changes.    Disposition and Plan     Clinical Impression:  1. Hypertension, unspecified type    2. Diarrhea, unspecified type        Disposition:  Discharge    Follow-up:  Vasquez Hilliard,  MD  [address]  [address]  [facility] [address]  [phone]    Follow up        Medications Prescribed:  Current Discharge Medication List        START taking these medications    Details   dicyclomine 20 MG Oral Tab Take 1 tablet (20 mg total) by mouth 4 (four) times daily as needed (diarrhea, abdominal cramps).  Qty: 30 tablet, Refills: 0      ondansetron 4 MG Oral Tablet Dispersible Take 1 tablet (4 mg total) by mouth every 4 (four) hours as needed for Nausea.  Qty: 10 tablet, Refills: 0

## 2024-08-13 NOTE — ED INITIAL ASSESSMENT (HPI)
Pt reports elevated blood pressure for the last couple days.  Pt also having nausea, diarrhea and headache.  Pt in on oral chemo with for CML.

## 2024-08-13 NOTE — TELEPHONE ENCOUNTER
Toxicities: Bosutinib    Runny Nose/Post Nasal Drip/Head Congestion/Sinus Pressure/Headache/Productive Cough/Generalized Body Aches/Fatigue/Rash/Constipation/Nausea/Anorexia/Dehydration/Dyspnea on Exertion/HTN    Runny Nose/Post Nasal Drip/Head Congestion/Sinus Pressure/HeadacheProductive Cough/Generalized Body Aches/Fatigue/Dyspnea on Exertion: (Dyana reports she started to have cold symptoms this past Saturday. She has a runny nose, post nasal drip, head congestion, sinus pressure, headache, generalized body aches, fatigue, dyspnea on exertion. She has a generalized rash all over her body since 8/5/2024. She reports having a fever yesterday, but not today. She reports her  has been sneezing with mild cold symptoms. She does not have a home covid test she can take.)  Constipation: Grade 2/Nausea (Dyana reports passing a \"very small, hard\" amount of stool yesterday. She felt nauseated and vomited x 1 yesterday. Today she is nauseated. No vomiting. She passed one liquid, brown stool. Her abdomen feels distended and bloated.)  Anorexia: Grade 2 (Dyana reports having intermittent nausea. Her abdomen is distended and bloated. She tried eating a egg and biscuit sandwich for breakfast, but could not eat very much. She has not drank any protein shakes. She vomited once yesterday. No vomiting today.)  Dehydration:Grade 2 (Dyana reports that she drinks 2 alcoholic drinks in the evening daily. She had 2 tequila and sprite zero last night. She has had 4 oz of iced coffee this morning and about 4 oz of water. She feels weak. She is light headed and dizzy when she stands up.)  HTN: (Dyana reports that her blood pressure has been high for the last 2 - 3 days. Her blood pressure was 176/88 yesterday and her pulse was in the high 70's. Today her blood pressure is 172/82 with a pulse of 74. She took her lisinopril and her hydralazine. She states her head feels \"foggy like she can't concentrate.\" Her vision is also blurry  at times.)    I explained that we can't test for flu, covid, RSV or strep in the office. Due to her cold symptoms, abdominal distention, bloating, elevated blood pressure, dyspnea on exertions she should be evaluated in the  ER. She agreed with the plan, but said she will have her  take her to the [place] [facility] since it is closer to home. I explained if she needs to be hospitalized, she would need to be transferred by ambulance. She still said she wanted to be evaluated in the [place] [facility].  I called report to the [place] [facility].

## 2024-08-14 ENCOUNTER — TELEPHONE (OUTPATIENT)
Dept: HEMATOLOGY/ONCOLOGY | Facility: HOSPITAL | Age: 59
End: 2024-08-14

## 2024-08-14 LAB
ADENOVIRUS F 40/41 PCR: NEGATIVE
ASTROVIRUS PCR: NEGATIVE
C CAYETANENSIS DNA SPEC QL NAA+PROBE: NEGATIVE
C DIFF GDH + TOXINS A+B STL QL IA.RAPID: NOT DETECTED
C DIFF TOX B STL QL: POSITIVE
CAMPY SP DNA.DIARRHEA STL QL NAA+PROBE: NEGATIVE
CRYPTOSP DNA SPEC QL NAA+PROBE: NEGATIVE
EAEC PAA PLAS AGGR+AATA ST NAA+NON-PRB: NEGATIVE
EC STX1+STX2 + H7 FLIC SPEC NAA+PROBE: NEGATIVE
ENTAMOEBA HISTOLYTICA PCR: NEGATIVE
EPEC EAE GENE STL QL NAA+NON-PROBE: POSITIVE
ETEC LTA+ST1A+ST1B TOX ST NAA+NON-PROBE: NEGATIVE
GIARDIA LAMBLIA PCR: NEGATIVE
NOROVIRUS GI/GII PCR: NEGATIVE
P SHIGELLOIDES DNA STL QL NAA+PROBE: NEGATIVE
ROTAVIRUS A PCR: NEGATIVE
SALMONELLA DNA SPEC QL NAA+PROBE: NEGATIVE
SAPOVIRUS PCR: NEGATIVE
SHIGELLA SP+EIEC IPAH ST NAA+NON-PROBE: NEGATIVE
V CHOLERAE DNA SPEC QL NAA+PROBE: NEGATIVE
VIBRIO DNA SPEC NAA+PROBE: NEGATIVE
YERSINIA DNA SPEC NAA+PROBE: NEGATIVE

## 2024-08-14 RX ORDER — VANCOMYCIN HYDROCHLORIDE 125 MG/1
125 CAPSULE ORAL 4 TIMES DAILY
Qty: 40 CAPSULE | Refills: 0 | Status: SHIPPED | OUTPATIENT
Start: 2024-08-14 | End: 2024-08-24

## 2024-08-14 NOTE — TELEPHONE ENCOUNTER
Toxicities: Bosutinib    Dyana called to let Dr Ordonez know that she tested positive for C Diff. She is picking up her vancomycin prescription this afternoon. She also said she was due to come for a follow up appt on 8/19/2024 at 1 pm.  Dyana asked to be called to reschedule her appointment for a date Dr Ordonez feels is appropriate.

## 2024-08-14 NOTE — ED NOTES
Returned call and notified of culture results and need for abx.  Instructed to wash hands with soap and water only.  Encouraged to have her own bathroom and to call and notify her doctors.  All questions answered and pt voiced understanding of all instructions.  No further action required

## 2024-08-19 ENCOUNTER — APPOINTMENT (OUTPATIENT)
Dept: HEMATOLOGY/ONCOLOGY | Age: 59
End: 2024-08-19
Attending: INTERNAL MEDICINE
Payer: MEDICARE

## 2024-08-26 DIAGNOSIS — R19.7 DIARRHEA: ICD-10-CM

## 2024-08-26 DIAGNOSIS — T50.905A ADVERSE EFFECT OF DRUG, INITIAL ENCOUNTER: Primary | ICD-10-CM

## 2024-09-15 NOTE — PROGRESS NOTES
[facility] Progress Note      Patient Name:  Dyana Souza  YOB: 1965  Medical Record Number:  [id number]    Date of visit:  9/16/2024    CHIEF COMPLAINT: CML    HPI:     59 year old that I initially saw in 5/24.  She was diagnosed with CLL in 2021 and followed at [facility].  Initial labs showed 62.49% BCR/ABL transcripts.  She started bosutinib.  Achieved a major molecular response in 9/22.  In 11/23, bosutinib was held for bradycardia which was then attributed to Coreg.  Also had some a flutter.  Went off bosutinib.  BCR ABL transcripts were positive in 1/24.  Bosutinib was restarted.  Held 8/24 when she was diagnosed with C. difficile and had diarrhea.    Continues to have some loose stools although they have improved.  Poor appetite.  Oral intake has been limited.  But per , there are some episodes of confusion.      SOCIAL HISTORY:    , homemaker, no children, has 1 dog.    ROS:     Constitutional: no fever, chills fatigue,night sweats or weight loss  Neurologic: no headache, seizures, diplopia or weakness  Respiratory: no cough, SOB or hemoptysis  Cardiovascular: no chest pain, ankle swelling, DE LA ROSA  GI: no nausea, vomiting, diarrhea or BRBPR  Musculoskeletal: no body-ache or joint pain  Dermatologic: no alopecia, rash, pruritis  : no hematuria, dysuria or frequency  Psych: no confusion or depression   Heme: no easy bruising or bleeding     ALLERGIES:    Allergies   Allergen Reactions    Amlodipine ANAPHYLAXIS    Clopidogrel WHEEZING and RASH    Metoclopramide HIVES and OTHER (SEE COMMENTS)     Cerner Allergy Text Annotation: Reglan, Cerner Reaction: hyper  Cerner Allergy Text Annotation: Reglan, Cerner Reaction: hyper      Mirtazapine Coughing, PALPITATIONS, Runny nose and SWELLING    Tramadol UNKNOWN    Aspirin ITCHING    Ciprofloxacin NAUSEA AND VOMITING and OTHER (SEE COMMENTS)     Cerner Allergy Text Annotation: ciprofloxacin, Cerner Reaction: vomit      Niacin And  Related UNKNOWN     Other reaction(s): Rash    Opioid Analgesics OTHER (SEE COMMENTS), UNKNOWN and NAUSEA AND VOMITING     Per pt  Cerner Allergy Text Annotation: morphine, Cerner Reaction: chest pain      Sulfamethoxazole W/Trimethoprim NAUSEA AND VOMITING    Amoxicillin-Pot Clavulanate OTHER (SEE COMMENTS)     Upset her stomach with Augmentin.  Patient took amoxicillin without problem.    Gabapentin OTHER (SEE COMMENTS)    Sertraline UNKNOWN     Cerner Allergy Text Annotation: Zoloft      Trazodone UNKNOWN    Sulfa Antibiotics OTHER (SEE COMMENTS) and RASH     Cerner Allergy Text Annotation: sulfa drugs, Cerner Reaction: hives         MEDICATIONS:    Current Outpatient Medications   Medication Sig Dispense Refill    ondansetron (ZOFRAN) 8 MG tablet Take 1 tablet (8 mg total) by mouth every 8 (eight) hours as needed for Nausea. 30 tablet 3    LISINOPRIL 20 MG Oral Tab Take 1 tablet (20 mg total) by mouth 2 (two) times daily. 60 tablet 0    FLUoxetine HCl 20 MG Oral Tab Take 1 tablet (20 mg total) by mouth daily.      atorvastatin 80 MG Oral Tab Take 1 tablet (80 mg total) by mouth nightly.      carvedilol 3.125 MG Oral Tab Take 1 tablet (3.125 mg total) by mouth 2 (two) times daily with meals.      aspirin 81 MG Oral Tab EC Take 1 tablet (81 mg total) by mouth daily.      Famotidine (PEPCID AC OR) Take 25 mg by mouth as needed.      NON FORMULARY Medical cannabis as needed.      zolpidem 10 MG Oral Tab Take 1 tablet (10 mg total) by mouth daily. 60 tablet 0    HYDROcodone-acetaminophen 5-325 MG Oral Tab Take 1-2 tablets by mouth 3 (three) times daily as needed. 30 tablet 0    clonazePAM 1 MG Oral Tab Take 1 tablet (1 mg total) by mouth 2 (two) times daily. 60 tablet 1    Mometasone Furoate 0.1 % External Cream Apply 1 Application topically daily. 60 g 3    Triamcinolone Acetonide (NASACORT ALLERGY 24HR) 55 MCG/ACT Nasal Aerosol 2 sprays in each nostril every day 16.5 g 0    Bosutinib 100 MG Oral Tab Take 400 mg by  mouth daily. (Patient not taking: Reported on 2024) 120 tablet 11    POTASSIUM CHLORIDE ER 20 MEQ Oral Tab CR TAKE 1 TABLET BY MOUTH DAILY (Patient not taking: Reported on 2024) 90 tablet 11       VITALS:  Height: 159.2 cm (5' 2.68\") (1337)  Weight: 75.5 kg (166 lb 8 oz) (1337)  BSA (Calculated - sq m): 1.78 sq meters (1337)  Pulse: 56 (1337)  BP: 123/77 (1337)  Temp: 98.2 °F (36.8 °C) (1337)  Do Not Use - Resp Rate: --  SpO2: 97 % (1337)    PS:  ECO    PHYSICAL EXAM:    General: alert and oriented x 3, not in acute distress.  Accompanied by .  Neck: No adenopathy.  CVS: S1S2, regular  Rhythm, no murmurs.   Lungs: Clear to auscultation and percussion.  Abdomen: Soft, very mild tenderness.  Extremities:  No edema.  CNS: no focal deficit    Emotional well being: Patient's emotional well being was assessed.  No issues requiring acute psychosocial intervention were identified.     LABS:     Component 24 1417   BCR-ABL1 Source Whole Blood   BCR-ABL1 Interpretation DETECTED   Comment: DETECTED for the BCR-ABL1 p210 (e13/a2 and e14/a2) fusiontranscripts.  Results should be correlated with appropriateclinical and laboratory information as indicated.   % BCR-ABL1 0.17%   Comment: Estimated to represent % of total ABL1 (%BCR-ABL1:ABL1) based on the International Scale (IS).   Molecular Response Value 2.77       Recent Results (from the past 24 hour(s))   CBC W/DIFF [E]    Collection Time: 24  1:35 PM   Result Value Ref Range    WBC 5.6 4.0 - 11.0 x10(3) uL    RBC 5.17 3.80 - 5.30 x10(6)uL    HGB 15.2 12.0 - 16.0 g/dL    HCT 43.8 35.0 - 48.0 %    .0 150.0 - 450.0 10(3)uL    MCV 84.7 80.0 - 100.0 fL    MCH 29.4 26.0 - 34.0 pg    MCHC 34.7 31.0 - 37.0 g/dL    RDW 12.6 %    Neutrophil Absolute Prelim 3.53 1.50 - 7.70 x10 (3) uL    Neutrophil Absolute 3.53 1.50 - 7.70 x10(3) uL    Lymphocyte Absolute 1.39 1.00 - 4.00 x10(3) uL    Monocyte Absolute  0.45 0.10 - 1.00 x10(3) uL    Eosinophil Absolute 0.15 0.00 - 0.70 x10(3) uL    Basophil Absolute 0.03 0.00 - 0.20 x10(3) uL    Immature Granulocyte Absolute 0.01 0.00 - 1.00 x10(3) uL    Neutrophil % 63.5 %    Lymphocyte % 25.0 %    Monocyte % 8.1 %    Eosinophil % 2.7 %    Basophil % 0.5 %    Immature Granulocyte % 0.2 %   COMP METABOLIC PANEL [E]    Collection Time: 09/16/24  1:35 PM   Result Value Ref Range    Glucose 135 (H) 70 - 99 mg/dL    Sodium 118 (LL) 136 - 145 mmol/L    Potassium 4.3 3.5 - 5.1 mmol/L    Chloride 87 (L) 98 - 112 mmol/L    CO2 26.0 21.0 - 32.0 mmol/L    Anion Gap 5 0 - 18 mmol/L    BUN 11 9 - 23 mg/dL    Creatinine 0.88 0.55 - 1.02 mg/dL    Calcium, Total 9.4 8.5 - 10.1 mg/dL    Calculated Osmolality 247 (L) 275 - 295 mOsm/kg    eGFR-Cr 76 >=60 mL/min/1.73m2    AST 29 15 - 37 U/L    ALT 39 13 - 56 U/L    Alkaline Phosphatase 99 46 - 118 U/L    Bilirubin, Total 0.5 0.1 - 2.0 mg/dL    Total Protein 7.0 6.4 - 8.2 g/dL    Albumin 3.6 3.4 - 5.0 g/dL    Globulin  3.4 2.8 - 4.4 g/dL    A/G Ratio 1.1 1.0 - 2.0    Patient Fasting for CMP? Patient not present        ASSESSMENT AND PLAN:     # CML: Initially diagnosed 6/21.  62.49 BCR/ABL transcripts in peripheral blood.  Started bosutinib, had major molecular response in 9/22.  Self discontinued bosutinib 11/23.  BCR/ABL transcripts +1/24.  Established with me 5/24.  Bosutinib restarted.  On hold since 8/24 for the diagnosis of C. difficile.  In 5/24, she continued to show response with 0.17% BCR/ABL transcripts.  Plan is to continue bosutinib but only should start once current C. difficile results.    # Hyponatremia: Noted on labs today.  Probably due to profound diarrhea following C. difficile infection 8/24.  Oral intake has been poor per .  Given significant drop, recommend she go to ER.  Report called.  Fluids today at cancer center will likely not be enough to treat her.    ORDERS PLACED:      Requested Prescriptions     Signed  Prescriptions Disp Refills    ondansetron (ZOFRAN) 8 MG tablet 30 tablet 3     Sig: Take 1 tablet (8 mg total) by mouth every 8 (eight) hours as needed for Nausea.       Return in about 3 months (around 12/16/2024) for Labs, MD visit.    Janene Ordonez M.D.    [facility]    [address] [place], [address]    9/16/2024

## 2024-09-16 ENCOUNTER — OFFICE VISIT (OUTPATIENT)
Dept: HEMATOLOGY/ONCOLOGY | Age: 59
End: 2024-09-16
Attending: INTERNAL MEDICINE
Payer: MEDICARE

## 2024-09-16 ENCOUNTER — HOSPITAL ENCOUNTER (INPATIENT)
Facility: HOSPITAL | Age: 59
LOS: 3 days | Discharge: HOME | End: 2024-09-19
Attending: EMERGENCY MEDICINE | Admitting: STUDENT IN AN ORGANIZED HEALTH CARE EDUCATION/TRAINING PROGRAM
Payer: MEDICARE

## 2024-09-16 VITALS
WEIGHT: 166.5 LBS | SYSTOLIC BLOOD PRESSURE: 123 MMHG | TEMPERATURE: 98 F | DIASTOLIC BLOOD PRESSURE: 77 MMHG | BODY MASS INDEX: 29.87 KG/M2 | HEIGHT: 62.68 IN | OXYGEN SATURATION: 97 % | RESPIRATION RATE: 18 BRPM | HEART RATE: 56 BPM

## 2024-09-16 DIAGNOSIS — E87.1 HYPONATREMIA: Primary | ICD-10-CM

## 2024-09-16 DIAGNOSIS — R19.7 DIARRHEA: ICD-10-CM

## 2024-09-16 DIAGNOSIS — R11.0 NAUSEA: Primary | ICD-10-CM

## 2024-09-16 DIAGNOSIS — T50.905A ADVERSE EFFECT OF DRUG, INITIAL ENCOUNTER: ICD-10-CM

## 2024-09-16 DIAGNOSIS — E61.1 IRON DEFICIENCY: ICD-10-CM

## 2024-09-16 LAB
ALBUMIN SERPL-MCNC: 3.6 G/DL (ref 3.4–5)
ALBUMIN SERPL-MCNC: 3.6 G/DL (ref 3.4–5)
ALBUMIN/GLOB SERPL: 1 {RATIO} (ref 1–2)
ALBUMIN/GLOB SERPL: 1.1 {RATIO} (ref 1–2)
ALP LIVER SERPL-CCNC: 99 U/L
ALP LIVER SERPL-CCNC: 99 U/L
ALT SERPL-CCNC: 38 U/L
ALT SERPL-CCNC: 39 U/L
ANION GAP SERPL CALC-SCNC: 4 MMOL/L (ref 0–18)
ANION GAP SERPL CALC-SCNC: 5 MMOL/L (ref 0–18)
ANION GAP SERPL CALC-SCNC: 6 MMOL/L (ref 0–18)
AST SERPL-CCNC: 27 U/L (ref 15–37)
AST SERPL-CCNC: 29 U/L (ref 15–37)
BASOPHILS # BLD AUTO: 0.03 X10(3) UL (ref 0–0.2)
BASOPHILS # BLD AUTO: 0.03 X10(3) UL (ref 0–0.2)
BASOPHILS NFR BLD AUTO: 0.5 %
BASOPHILS NFR BLD AUTO: 0.5 %
BILIRUB SERPL-MCNC: 0.4 MG/DL (ref 0.1–2)
BILIRUB SERPL-MCNC: 0.5 MG/DL (ref 0.1–2)
BUN BLD-MCNC: 10 MG/DL (ref 9–23)
BUN BLD-MCNC: 11 MG/DL (ref 9–23)
BUN BLD-MCNC: 7 MG/DL (ref 9–23)
CALCIUM BLD-MCNC: 9.3 MG/DL (ref 8.7–10.4)
CALCIUM BLD-MCNC: 9.4 MG/DL (ref 8.5–10.1)
CALCIUM BLD-MCNC: 9.5 MG/DL (ref 8.5–10.1)
CHLORIDE SERPL-SCNC: 87 MMOL/L (ref 98–112)
CHLORIDE SERPL-SCNC: 88 MMOL/L (ref 98–112)
CHLORIDE SERPL-SCNC: 96 MMOL/L (ref 98–112)
CO2 SERPL-SCNC: 23 MMOL/L (ref 21–32)
CO2 SERPL-SCNC: 26 MMOL/L (ref 21–32)
CO2 SERPL-SCNC: 28 MMOL/L (ref 21–32)
CORTIS SERPL-MCNC: 14.8 UG/DL
CREAT BLD-MCNC: 0.69 MG/DL
CREAT BLD-MCNC: 0.88 MG/DL
CREAT BLD-MCNC: 0.89 MG/DL
CREAT UR-SCNC: 58.2 MG/DL
EGFRCR SERPLBLD CKD-EPI 2021: 100 ML/MIN/1.73M2 (ref 60–?)
EGFRCR SERPLBLD CKD-EPI 2021: 75 ML/MIN/1.73M2 (ref 60–?)
EGFRCR SERPLBLD CKD-EPI 2021: 76 ML/MIN/1.73M2 (ref 60–?)
EOSINOPHIL # BLD AUTO: 0.14 X10(3) UL (ref 0–0.7)
EOSINOPHIL # BLD AUTO: 0.15 X10(3) UL (ref 0–0.7)
EOSINOPHIL NFR BLD AUTO: 2.5 %
EOSINOPHIL NFR BLD AUTO: 2.7 %
ERYTHROCYTE [DISTWIDTH] IN BLOOD BY AUTOMATED COUNT: 12.5 %
ERYTHROCYTE [DISTWIDTH] IN BLOOD BY AUTOMATED COUNT: 12.6 %
GLOBULIN PLAS-MCNC: 3.4 G/DL (ref 2.8–4.4)
GLOBULIN PLAS-MCNC: 3.5 G/DL (ref 2.8–4.4)
GLUCOSE BLD-MCNC: 100 MG/DL (ref 70–99)
GLUCOSE BLD-MCNC: 135 MG/DL (ref 70–99)
GLUCOSE BLD-MCNC: 88 MG/DL (ref 70–99)
HCT VFR BLD AUTO: 43.8 %
HCT VFR BLD AUTO: 44.2 %
HGB BLD-MCNC: 15.2 G/DL
HGB BLD-MCNC: 15.6 G/DL
IMM GRANULOCYTES # BLD AUTO: 0.01 X10(3) UL (ref 0–1)
IMM GRANULOCYTES # BLD AUTO: 0.01 X10(3) UL (ref 0–1)
IMM GRANULOCYTES NFR BLD: 0.2 %
IMM GRANULOCYTES NFR BLD: 0.2 %
LYMPHOCYTES # BLD AUTO: 1.39 X10(3) UL (ref 1–4)
LYMPHOCYTES # BLD AUTO: 1.48 X10(3) UL (ref 1–4)
LYMPHOCYTES NFR BLD AUTO: 25 %
LYMPHOCYTES NFR BLD AUTO: 26.1 %
MAGNESIUM SERPL-MCNC: 2.2 MG/DL (ref 1.6–2.6)
MCH RBC QN AUTO: 29.4 PG (ref 26–34)
MCH RBC QN AUTO: 29.8 PG (ref 26–34)
MCHC RBC AUTO-ENTMCNC: 34.7 G/DL (ref 31–37)
MCHC RBC AUTO-ENTMCNC: 35.3 G/DL (ref 31–37)
MCV RBC AUTO: 84.5 FL
MCV RBC AUTO: 84.7 FL
MONOCYTES # BLD AUTO: 0.45 X10(3) UL (ref 0.1–1)
MONOCYTES # BLD AUTO: 0.74 X10(3) UL (ref 0.1–1)
MONOCYTES NFR BLD AUTO: 13.1 %
MONOCYTES NFR BLD AUTO: 8.1 %
NEUTROPHILS # BLD AUTO: 3.27 X10 (3) UL (ref 1.5–7.7)
NEUTROPHILS # BLD AUTO: 3.27 X10(3) UL (ref 1.5–7.7)
NEUTROPHILS # BLD AUTO: 3.53 X10 (3) UL (ref 1.5–7.7)
NEUTROPHILS # BLD AUTO: 3.53 X10(3) UL (ref 1.5–7.7)
NEUTROPHILS NFR BLD AUTO: 57.6 %
NEUTROPHILS NFR BLD AUTO: 63.5 %
OSMOLALITY SERPL CALC.SUM OF ELEC: 247 MOSM/KG (ref 275–295)
OSMOLALITY SERPL CALC.SUM OF ELEC: 248 MOSM/KG (ref 275–295)
OSMOLALITY SERPL CALC.SUM OF ELEC: 258 MOSM/KG (ref 275–295)
OSMOLALITY SERPL: 259 MOSM/KG (ref 280–300)
PLATELET # BLD AUTO: 334 10(3)UL (ref 150–450)
PLATELET # BLD AUTO: 342 10(3)UL (ref 150–450)
POTASSIUM SERPL-SCNC: 4.2 MMOL/L (ref 3.5–5.1)
POTASSIUM SERPL-SCNC: 4.3 MMOL/L (ref 3.5–5.1)
POTASSIUM SERPL-SCNC: 4.3 MMOL/L (ref 3.5–5.1)
PROT SERPL-MCNC: 7 G/DL (ref 6.4–8.2)
PROT SERPL-MCNC: 7.1 G/DL (ref 6.4–8.2)
RBC # BLD AUTO: 5.17 X10(6)UL
RBC # BLD AUTO: 5.23 X10(6)UL
SODIUM SERPL-SCNC: 118 MMOL/L (ref 136–145)
SODIUM SERPL-SCNC: 120 MMOL/L (ref 136–145)
SODIUM SERPL-SCNC: 125 MMOL/L (ref 136–145)
TSI SER-ACNC: 3.11 MIU/ML (ref 0.55–4.78)
URATE UR-MCNC: 31.6 MG/DL
WBC # BLD AUTO: 5.6 X10(3) UL (ref 4–11)
WBC # BLD AUTO: 5.7 X10(3) UL (ref 4–11)

## 2024-09-16 PROCEDURE — 82533 TOTAL CORTISOL: CPT | Performed by: EMERGENCY MEDICINE

## 2024-09-16 PROCEDURE — G2211 COMPLEX E/M VISIT ADD ON: HCPCS | Performed by: INTERNAL MEDICINE

## 2024-09-16 PROCEDURE — 83735 ASSAY OF MAGNESIUM: CPT | Performed by: EMERGENCY MEDICINE

## 2024-09-16 PROCEDURE — 99215 OFFICE O/P EST HI 40 MIN: CPT | Performed by: INTERNAL MEDICINE

## 2024-09-16 PROCEDURE — 84560 ASSAY OF URINE/URIC ACID: CPT | Performed by: EMERGENCY MEDICINE

## 2024-09-16 PROCEDURE — 96360 HYDRATION IV INFUSION INIT: CPT

## 2024-09-16 PROCEDURE — 83930 ASSAY OF BLOOD OSMOLALITY: CPT | Performed by: EMERGENCY MEDICINE

## 2024-09-16 PROCEDURE — 82570 ASSAY OF URINE CREATININE: CPT | Performed by: EMERGENCY MEDICINE

## 2024-09-16 PROCEDURE — 99285 EMERGENCY DEPT VISIT HI MDM: CPT

## 2024-09-16 PROCEDURE — 85025 COMPLETE CBC W/AUTO DIFF WBC: CPT | Performed by: EMERGENCY MEDICINE

## 2024-09-16 PROCEDURE — 96361 HYDRATE IV INFUSION ADD-ON: CPT

## 2024-09-16 PROCEDURE — 80053 COMPREHEN METABOLIC PANEL: CPT | Performed by: EMERGENCY MEDICINE

## 2024-09-16 PROCEDURE — 84443 ASSAY THYROID STIM HORMONE: CPT | Performed by: EMERGENCY MEDICINE

## 2024-09-16 RX ORDER — ZOLPIDEM TARTRATE 5 MG/1
10 TABLET ORAL NIGHTLY
Status: DISCONTINUED | OUTPATIENT
Start: 2024-09-16 | End: 2024-09-19

## 2024-09-16 RX ORDER — HEPARIN SODIUM 5000 [USP'U]/ML
5000 INJECTION, SOLUTION INTRAVENOUS; SUBCUTANEOUS EVERY 8 HOURS SCHEDULED
Status: DISCONTINUED | OUTPATIENT
Start: 2024-09-16 | End: 2024-09-19

## 2024-09-16 RX ORDER — ASPIRIN 81 MG/1
81 TABLET ORAL DAILY
Status: DISCONTINUED | OUTPATIENT
Start: 2024-09-16 | End: 2024-09-19

## 2024-09-16 RX ORDER — CARVEDILOL 3.12 MG/1
3.12 TABLET ORAL
Status: DISCONTINUED | OUTPATIENT
Start: 2024-09-16 | End: 2024-09-19

## 2024-09-16 RX ORDER — ATORVASTATIN CALCIUM 80 MG/1
80 TABLET, FILM COATED ORAL NIGHTLY
Status: DISCONTINUED | OUTPATIENT
Start: 2024-09-16 | End: 2024-09-19

## 2024-09-16 RX ORDER — POLYETHYLENE GLYCOL 3350 17 G/17G
17 POWDER, FOR SOLUTION ORAL DAILY PRN
Status: DISCONTINUED | OUTPATIENT
Start: 2024-09-16 | End: 2024-09-19

## 2024-09-16 RX ORDER — ONDANSETRON 8 MG/1
8 TABLET, FILM COATED ORAL EVERY 8 HOURS PRN
Qty: 30 TABLET | Refills: 3 | Status: ON HOLD | OUTPATIENT
Start: 2024-09-16

## 2024-09-16 RX ORDER — BISACODYL 10 MG
10 SUPPOSITORY, RECTAL RECTAL
Status: DISCONTINUED | OUTPATIENT
Start: 2024-09-16 | End: 2024-09-19

## 2024-09-16 RX ORDER — SODIUM PHOSPHATE, DIBASIC AND SODIUM PHOSPHATE, MONOBASIC 7; 19 G/230ML; G/230ML
1 ENEMA RECTAL ONCE AS NEEDED
Status: DISCONTINUED | OUTPATIENT
Start: 2024-09-16 | End: 2024-09-19

## 2024-09-16 RX ORDER — SENNOSIDES 8.6 MG
17.2 TABLET ORAL NIGHTLY PRN
Status: DISCONTINUED | OUTPATIENT
Start: 2024-09-16 | End: 2024-09-19

## 2024-09-16 RX ORDER — CLONAZEPAM 0.5 MG/1
1 TABLET ORAL 2 TIMES DAILY
Status: DISCONTINUED | OUTPATIENT
Start: 2024-09-16 | End: 2024-09-19

## 2024-09-16 RX ORDER — ACETAMINOPHEN 500 MG
500 TABLET ORAL EVERY 4 HOURS PRN
Status: DISCONTINUED | OUTPATIENT
Start: 2024-09-16 | End: 2024-09-19

## 2024-09-16 RX ORDER — MELATONIN
3 NIGHTLY PRN
Status: DISCONTINUED | OUTPATIENT
Start: 2024-09-16 | End: 2024-09-19

## 2024-09-16 RX ORDER — ONDANSETRON 2 MG/ML
4 INJECTION INTRAMUSCULAR; INTRAVENOUS EVERY 6 HOURS PRN
Status: DISCONTINUED | OUTPATIENT
Start: 2024-09-16 | End: 2024-09-19

## 2024-09-16 RX ORDER — PROCHLORPERAZINE EDISYLATE 5 MG/ML
5 INJECTION INTRAMUSCULAR; INTRAVENOUS EVERY 8 HOURS PRN
Status: DISCONTINUED | OUTPATIENT
Start: 2024-09-16 | End: 2024-09-19

## 2024-09-16 NOTE — PROGRESS NOTES
Education Record    Learner:  Patient/ spouse    Disease / Diagnosis:CML      Barriers / Limitations:  None   Comments:    Method:  Discussion   Comments:    General Topics:  Plan of care reviewed   Comments:    Outcome:  Shows understanding   Comments:   Bosutinib on hold since recent Cdiff and diarrhea.   Reports the diarrhea has stopped.   Has had trouble eating a regular diet, today ate a hamburger.   Yesterday with normal  BM.   Reports one day a few weeks ago she did pass out, was not evaluated at ED.    reports she is sometimes, \"discombobulated\"   Let her know one of her electrolytes is low.

## 2024-09-16 NOTE — ED QUICK NOTES
Orders for admission, patient is aware of plan and ready to go upstairs. Any questions, please call ED RN Bryan at extension [phone].     Patient Covid vaccination status: Fully vaccinated     COVID Test Ordered in ED: None    COVID Suspicion at Admission: N/A    Running Infusions:      Mental Status/LOC at time of transport: Alert and oriented 4/4    Other pertinent information:   CIWA score: N/A   NIH score:  N/A

## 2024-09-16 NOTE — ED PROVIDER NOTES
Patient Seen in: [facility] [place]      History     Chief Complaint   Patient presents with    Abnormal Labs     Stated Complaint: sent from ca center due to low sodium    Subjective:   HPI    This is a 59-year-old woman, history of CLL sent from cancer center for evaluation of low sodium, 118.  Patient states was on chemotherapy up until about 2 weeks ago, states was having nausea, has had a poor appetite for the past 2 weeks, no vomiting or diarrhea.  Denies any excess water intake, any other complaints or concerns.    Objective:   Past Medical History:    Achalasia    Anxiety    Back pain    Chronic fatigue syndrome    Completed stroke (HCC)    Coronary atherosclerosis of native coronary artery    Overview:  Cath by DANIEL Gregg 2007 for R/S CP with radiation to her back and jaw.  RCA stent (done in [place]) . Tubular LAD/Diag stent of 80% was treated with kissing balloon. LVEF 50%.   CABG 2007 had CABG x4 by Dr JAYLYN Maher. KIMBLE-LAD, SVG-OM, SVG-PDA and SVG-Diag. Admitted with atypical CP 2015, Trop (-), ECHO LVEF  55% w/o WMA, MPI done 2015 negative for ischemia.       Degenerative joint disease (DJD) of lumbar spine    Diabetes mellitus (HCC)    Diverticulitis    Edema    Essential hypertension    Fibromyalgia    GERD (gastroesophageal reflux disease)    Insomnia    Leukemia (HCC)    Muscle spasm    Obesity    Obstructive sleep apnea    Old myocardial infarction    Snoring              Past Surgical History:   Procedure Laterality Date    Appendectomy      Cabg, arterial, four+          Cholecystectomy      Colonoscopy      Hysterectomy      partial, not due to cancer    Oophorectomy Right     noncancer,                Social History     Socioeconomic History    Marital status:    Tobacco Use    Smoking status: Former     Current packs/day: 0.00     Types: Cigarettes     Quit date:      Years since quittin.7    Smokeless tobacco: Never   Vaping Use    Vaping  status: Never Used   Substance and Sexual Activity    Alcohol use: No     Comment: occ    Drug use: Yes     Types: Cannabis     Social Determinants of Health     Food Insecurity: No Food Insecurity (2/29/2024)    Received from [facility]    Food Insecurity     Currently or in the past 3 months, have you worried your food would run out before you had money to buy more?: No     In the past 12 months, have you run out of food or been unable to get more?: No   Transportation Needs: No Transportation Needs (2/29/2024)    Received from [facility]    Transportation Needs     Medical Transportation Needs?: No    Received from [facility]    Social Connections    Received from [facility]    Housing Stability              Review of Systems    Positive for stated Chief Complaint: Abnormal Labs    Other systems are as noted in HPI.  Constitutional and vital signs reviewed.      All other systems reviewed and negative except as noted above.    Physical Exam     ED Triage Vitals [09/16/24 1506]   /75   Pulse 59   Resp 18   Temp 98.1 °F (36.7 °C)   Temp src Oral   SpO2 97 %   O2 Device None (Room air)       Current Vitals:   Vital Signs  BP: 149/60  Pulse: 52  Resp: 18  Temp: 97.9 °F (36.6 °C)  Temp src: Temporal    Oxygen Therapy  SpO2: 100 %  O2 Device: None (Room air)            Physical Exam        Physical Exam  Vitals signs and nursing note reviewed.   General:  Patient laying supine in the bed in no acute distress  Head: Normocephalic and atraumatic.   HEENT:  Mucous membranes are moist.   Cardiovascular:  Normal rate and regular rhythm.  No Edema  Pulmonary:  Pulmonary effort is normal.  Normal breath sounds. No wheezing, rhonchi or rales.   Abdominal: Soft nontender nondistended, normal bowel sounds, no guarding no rebound tenderness  Skin: Warm and dry  Neurological: Awake alert, speech is normal        ED Course     Labs Reviewed   COMP  METABOLIC PANEL (14) - Abnormal; Notable for the following components:       Result Value    Sodium 120 (*)     Chloride 88 (*)     Calculated Osmolality 248 (*)     All other components within normal limits   MAGNESIUM - Normal   CBC WITH DIFFERENTIAL WITH PLATELET   CORTISOL   CREATININE, URINE, RANDOM   OSMOLALITY, SERUM   TSH W REFLEX TO FREE T4   URIC ACID URINE RANDOM   RAINBOW DRAW LAVENDER   RAINBOW DRAW LIGHT GREEN          No results found.         MDM      This is a 59-year-old woman here for evaluation of hyponatremia.  Differential includes hypervolemic hypovolemic hyponatremia.  Renal function is normal, patient does report poor p.o. intake over the past 2 weeks.  Repeat sodium here today is 120 from 118 earlier she states she did have a meal earlier today.  Denies any focal abdominal pain any vomiting or diarrhea does report mild nausea.  Did receive 1 L normal saline.  Did discuss with nephrology, recommends no additional IV fluids at this time, repeat BMP at 8 PM tonight, and then decision will be made regarding further IV fluids.  Case was discussed with Ethel hospitalist who agrees with plan for admission  Admission disposition: 9/16/2024  4:19 PM                                        Medical Decision Making      Disposition and Plan     Clinical Impression:  1. Hyponatremia         Disposition:  Admit  9/16/2024  4:19 pm    Follow-up:  No follow-up provider specified.        Medications Prescribed:  Current Discharge Medication List                            Hospital Problems       Present on Admission  Date Reviewed: 9/16/2024            ICD-10-CM Noted POA    * (Principal) Hyponatremia E87.1 9/16/2024 Unknown

## 2024-09-17 LAB
ANION GAP SERPL CALC-SCNC: 5 MMOL/L (ref 0–18)
ANION GAP SERPL CALC-SCNC: 5 MMOL/L (ref 0–18)
BASOPHILS # BLD AUTO: 0.03 X10(3) UL (ref 0–0.2)
BASOPHILS NFR BLD AUTO: 0.6 %
BUN BLD-MCNC: 8 MG/DL (ref 9–23)
BUN BLD-MCNC: 8 MG/DL (ref 9–23)
CALCIUM BLD-MCNC: 9.4 MG/DL (ref 8.7–10.4)
CALCIUM BLD-MCNC: 9.4 MG/DL (ref 8.7–10.4)
CHLORIDE SERPL-SCNC: 97 MMOL/L (ref 98–112)
CHLORIDE SERPL-SCNC: 97 MMOL/L (ref 98–112)
CO2 SERPL-SCNC: 27 MMOL/L (ref 21–32)
CO2 SERPL-SCNC: 27 MMOL/L (ref 21–32)
CREAT BLD-MCNC: 0.7 MG/DL
CREAT BLD-MCNC: 0.74 MG/DL
EGFRCR SERPLBLD CKD-EPI 2021: 100 ML/MIN/1.73M2 (ref 60–?)
EGFRCR SERPLBLD CKD-EPI 2021: 93 ML/MIN/1.73M2 (ref 60–?)
EOSINOPHIL # BLD AUTO: 0.15 X10(3) UL (ref 0–0.7)
EOSINOPHIL NFR BLD AUTO: 3.2 %
ERYTHROCYTE [DISTWIDTH] IN BLOOD BY AUTOMATED COUNT: 12.6 %
GLUCOSE BLD-MCNC: 92 MG/DL (ref 70–99)
GLUCOSE BLD-MCNC: 97 MG/DL (ref 70–99)
HCT VFR BLD AUTO: 42.3 %
HGB BLD-MCNC: 14.3 G/DL
IMM GRANULOCYTES # BLD AUTO: 0 X10(3) UL (ref 0–1)
IMM GRANULOCYTES NFR BLD: 0 %
LYMPHOCYTES # BLD AUTO: 1.7 X10(3) UL (ref 1–4)
LYMPHOCYTES NFR BLD AUTO: 36.6 %
MAGNESIUM SERPL-MCNC: 2.1 MG/DL (ref 1.6–2.6)
MCH RBC QN AUTO: 29.4 PG (ref 26–34)
MCHC RBC AUTO-ENTMCNC: 33.8 G/DL (ref 31–37)
MCV RBC AUTO: 86.9 FL
MONOCYTES # BLD AUTO: 0.64 X10(3) UL (ref 0.1–1)
MONOCYTES NFR BLD AUTO: 13.8 %
NEUTROPHILS # BLD AUTO: 2.13 X10 (3) UL (ref 1.5–7.7)
NEUTROPHILS # BLD AUTO: 2.13 X10(3) UL (ref 1.5–7.7)
NEUTROPHILS NFR BLD AUTO: 45.8 %
OSMOLALITY SERPL CALC.SUM OF ELEC: 266 MOSM/KG (ref 275–295)
OSMOLALITY SERPL CALC.SUM OF ELEC: 266 MOSM/KG (ref 275–295)
PLATELET # BLD AUTO: 265 10(3)UL (ref 150–450)
POTASSIUM SERPL-SCNC: 4.1 MMOL/L (ref 3.5–5.1)
POTASSIUM SERPL-SCNC: 4.2 MMOL/L (ref 3.5–5.1)
RBC # BLD AUTO: 4.87 X10(6)UL
SODIUM SERPL-SCNC: 129 MMOL/L (ref 136–145)
SODIUM SERPL-SCNC: 129 MMOL/L (ref 136–145)
WBC # BLD AUTO: 4.7 X10(3) UL (ref 4–11)

## 2024-09-17 PROCEDURE — 80048 BASIC METABOLIC PNL TOTAL CA: CPT | Performed by: STUDENT IN AN ORGANIZED HEALTH CARE EDUCATION/TRAINING PROGRAM

## 2024-09-17 PROCEDURE — 83735 ASSAY OF MAGNESIUM: CPT | Performed by: STUDENT IN AN ORGANIZED HEALTH CARE EDUCATION/TRAINING PROGRAM

## 2024-09-17 PROCEDURE — 85025 COMPLETE CBC W/AUTO DIFF WBC: CPT | Performed by: STUDENT IN AN ORGANIZED HEALTH CARE EDUCATION/TRAINING PROGRAM

## 2024-09-17 RX ORDER — DEXTROSE MONOHYDRATE 50 MG/ML
INJECTION, SOLUTION INTRAVENOUS CONTINUOUS
Status: DISCONTINUED | OUTPATIENT
Start: 2024-09-17 | End: 2024-09-17

## 2024-09-17 NOTE — PLAN OF CARE
Resting in bed. Awake & alert. Denies pain. Appetite has improved w/ good intake for breakfast & .lunch. D5 at 100/hr was resumed as ordered by nephrology. Repeat labs at 2200 tonight. Current sodium level is 129. Will continue to monitor.Call light in reach.

## 2024-09-17 NOTE — H&P
[facility] Hospitalist H&P       CC:   Chief Complaint   Patient presents with    Abnormal Labs        PCP: Vasquez Hilliard MD    History of Present Illness: Patient is a 59 year old female with PMH sig for CLL on bosutinib, recent C. difficile, anxiety, hypertension, diabetes, CAD who was sent from her oncologist office for abnormal lab work.  Patient has been feeling very poorly for the past few days, she reports very poor appetite.  She states that she had a bout of C. difficile in August and was treated however since then she has not quite bounced back.  Per patient she was told to hold her bosutinib since her C. difficile episode.  Labs in the oncology office showed sodium of 118.  Patient was told to come to the hospital for admission.  In the ER, vital signs were stable.  Labs with sodium of 120.Patient is admitted for further workup and management.      PMH  Past Medical History:    Achalasia    Anxiety    Back pain    Chronic fatigue syndrome    Completed stroke (HCC)    Coronary atherosclerosis of native coronary artery    Overview:  Cath by DANIEL Gregg 06/13/2007 for R/S CP with radiation to her back and jaw.  RCA stent (done in [place]) . Tubular LAD/Diag stent of 80% was treated with kissing balloon. LVEF 50%.   CABG 07/30/2007 had CABG x4 by Dr JAYLYN Maher. KIMBLE-LAD, SVG-OM, SVG-PDA and SVG-Diag. Admitted with atypical CP Jul 2015, Trop (-), ECHO LVEF  55% w/o WMA, MPI done Jan 2015 negative for ischemia.       Degenerative joint disease (DJD) of lumbar spine    Diabetes mellitus (HCC)    Diverticulitis    Edema    Essential hypertension    Fibromyalgia    GERD (gastroesophageal reflux disease)    Insomnia    Leukemia (HCC)    Muscle spasm    Obesity    Obstructive sleep apnea    Old myocardial infarction    Snoring        PSH  Past Surgical History:   Procedure Laterality Date    Appendectomy      Cabg, arterial, four+      2007    Cholecystectomy      Colonoscopy      Hysterectomy      partial, not due to cancer     Oophorectomy Right     noncancer,        ALL:  Allergies   Allergen Reactions    Amlodipine ANAPHYLAXIS    Clopidogrel WHEEZING and RASH    Metoclopramide HIVES and OTHER (SEE COMMENTS)     Cerner Allergy Text Annotation: Reglan, Cerner Reaction: hyper  Cerner Allergy Text Annotation: Reglan, Cerner Reaction: hyper      Mirtazapine Coughing, PALPITATIONS, Runny nose and SWELLING    Tramadol UNKNOWN    Aspirin ITCHING    Ciprofloxacin NAUSEA AND VOMITING and OTHER (SEE COMMENTS)     Cerner Allergy Text Annotation: ciprofloxacin, Cerner Reaction: vomit      Niacin And Related UNKNOWN     Other reaction(s): Rash    Opioid Analgesics OTHER (SEE COMMENTS), UNKNOWN and NAUSEA AND VOMITING     Per pt  Cerner Allergy Text Annotation: morphine, Cerner Reaction: chest pain      Sulfamethoxazole W/Trimethoprim NAUSEA AND VOMITING    Amoxicillin-Pot Clavulanate OTHER (SEE COMMENTS)     Upset her stomach with Augmentin.  Patient took amoxicillin without problem.    Gabapentin OTHER (SEE COMMENTS)    Sertraline UNKNOWN     Cerner Allergy Text Annotation: Zoloft      Trazodone UNKNOWN    Sulfa Antibiotics OTHER (SEE COMMENTS) and RASH     Cerner Allergy Text Annotation: sulfa drugs, Cerner Reaction: hives          Home Medications:  Outpatient Medications Marked as Taking for the 9/16/24 encounter (Hospital Encounter)   Medication Sig Dispense Refill    ondansetron (ZOFRAN) 8 MG tablet Take 1 tablet (8 mg total) by mouth every 8 (eight) hours as needed for Nausea. 30 tablet 3    LISINOPRIL 20 MG Oral Tab Take 1 tablet (20 mg total) by mouth 2 (two) times daily. 60 tablet 0    FLUoxetine HCl 20 MG Oral Tab Take 1 tablet (20 mg total) by mouth daily.      atorvastatin 80 MG Oral Tab Take 1 tablet (80 mg total) by mouth nightly.      carvedilol 3.125 MG Oral Tab Take 1 tablet (3.125 mg total) by mouth 2 (two) times daily with meals.      aspirin 81 MG Oral Tab EC Take 1 tablet (81 mg total) by mouth daily.      Famotidine (PEPCID AC  OR) Take 25 mg by mouth in the morning and 25 mg before bedtime.      NON FORMULARY Medical cannabis as needed.      zolpidem 10 MG Oral Tab Take 1 tablet (10 mg total) by mouth daily. (Patient taking differently: Take 1 tablet (10 mg total) by mouth nightly.) 60 tablet 0    clonazePAM 1 MG Oral Tab Take 1 tablet (1 mg total) by mouth 2 (two) times daily. 60 tablet 1    Mometasone Furoate 0.1 % External Cream Apply 1 Application topically daily. 60 g 3         Soc Hx  Social History     Tobacco Use    Smoking status: Former     Current packs/day: 0.00     Types: Cigarettes     Quit date:      Years since quittin.7    Smokeless tobacco: Never   Substance Use Topics    Alcohol use: No     Comment: occ        Fam Hx  Family History   Problem Relation Age of Onset    Cancer Paternal Grandfather         pancreatic ca    Other (malignant neoplasm of pancrease) Paternal Grandfather     Stroke Brother     Other (lupus anticoagulant) Brother        Review of Systems  Comprehensive ROS reviewed and negative except for what's stated above.     OBJECTIVE:  /65 (BP Location: Right arm)   Pulse 53   Temp 97.9 °F (36.6 °C) (Oral)   Resp 18   Ht 5' 3\" (1.6 m)   Wt 165 lb 4.8 oz (75 kg)   LMP  (LMP Unknown)   SpO2 98%   BMI 29.28 kg/m²   General:  Alert, no distress, chronically ill appearing   Head:  Normocephalic, without obvious abnormality, atraumatic.   Eyes:  Sclera anicteric, No conjunctival pallor, EOMs intact.    Nose: Nares normal. Septum midline. Mucosa normal. No drainage.   Throat: Lips, mucosa, and tongue normal. Teeth and gums normal.   Neck: Supple, symmetrical, trachea midline,   Lungs:   Clear to auscultation bilaterally. Normal effort   Chest wall:  No tenderness or deformity.   Heart:  Regular rate and rhythm, S1, S2 normal   Abdomen:   Soft, non-tender. Bowel sounds normal. No masses,  Non distended   Extremities: Extremities normal, atraumatic, no cyanosis or edema.   Skin: Skin color,  texture, turgor normal. No rashes or lesions.    Neurologic: Normal strength, no focal deficit appreciated     Diagnostic Data:    CBC/Chem  Recent Labs   Lab 09/16/24  1335 09/16/24  1525 09/17/24  0623   WBC 5.6 5.7 4.7   HGB 15.2 15.6 14.3   MCV 84.7 84.5 86.9   .0 342.0 265.0       Recent Labs   Lab 09/16/24  1335 09/16/24  1525 09/16/24 2007 09/17/24  0623   * 120* 125* 129*   K 4.3 4.2 4.3 4.1   CL 87* 88* 96* 97*   CO2 26.0 28.0 23.0 27.0   BUN 11 10 7* 8*   CREATSERUM 0.88 0.89 0.69 0.70   * 88 100* 97   CA 9.4 9.5 9.3 9.4   MG  --  2.2  --  2.1       Recent Labs   Lab 09/16/24  1335 09/16/24  1525   ALT 39 38   AST 29 27   ALB 3.6 3.6       No results for input(s): \"TROP\" in the last 168 hours.    CXR: image personally reviewed     Radiology: No results found.      ASSESSMENT / PLAN:   Patient is a 59 year old female with PMH sig for CLL on bosutinib, recent C. difficile, anxiety, hypertension, diabetes, CAD who was sent from her oncologist office for abnormal lab work.      Hyponatremia  - Likely hypovolemic in the setting of poor oral intake and chemotherapy/diarrhea  - Sodium 120 on admission, received IVF in the ER-> 129  - Plan for D5w, repeat BMP per nephrology  - Urine lites pending  - Okay to continue SSRIs per renal  - Nephrology on consult, case discussed    CLL  - Previously on bosutinib, stopped in August given C. difficile  - Per nephrology notes, plan was to resume it if C. difficile is cleared  - Will defer this to outpatient at this time    Generalized anxiety disorder  - Clonazepam per home regimen  -Resume fluoxetine    Hypertension  - Holding lisinopril, resume Coreg    Remote history of A-fib  - Continue Coreg      FN:  - IVF: D5W  - Diet: General    DVT Prophy: SCDs, heparin subcu  Atrophy: Ambulate as tolerated  Lines: PIV    Dispo: pending clinical course    Outpatient records or previous hospital records reviewed.     Further recommendations pending patient's  clinical course.  [facility] hospitalist to continue to follow patient while in house    Patient and/or patient's family given opportunity to ask questions and note understanding and agreeing with therapeutic plan as outlined    Thank You,  DO Laurence Arriaga Hospitalist  Answering Service number: [phone]

## 2024-09-17 NOTE — CONSULTS
[facility] - Nephrology  Inpatient Consultation    Dyana Souza Patient Status:  Inpatient    1965 MRN [id number]   Location [facility] 4NW-A Attending Jitendra Manzano,    Hosp Day # 1 PCP Vasquez Hilliard MD     Reason for consult: Hyponatremia  Date of consultation: 2024     HISTORY OF PRESENT ILLNESS:     Dyana Souza is a 59 year old female with a medical history notable for CLL being treated with Bosutinib (recently held since ), who presents with hyponatremia. Noted to have poor PO intake due to nausea over the past couple of weeks. Sodium 118 on admission. Nephrology consulted for further workup and management.     REVIEW OF SYSTEMS:     ROS as above, otherwise negative    HISTORY:     Past Medical History:    Achalasia    Anxiety    Back pain    Chronic fatigue syndrome    Completed stroke (HCC)    Coronary atherosclerosis of native coronary artery    Overview:  Cath by DANIEL Gregg 2007 for R/S CP with radiation to her back and jaw.  RCA stent (done in [place]) . Tubular LAD/Diag stent of 80% was treated with kissing balloon. LVEF 50%.   CABG 2007 had CABG x4 by Dr JAYLYN Maher. KIMBLE-LAD, SVG-OM, SVG-PDA and SVG-Diag. Admitted with atypical CP 2015, Trop (-), ECHO LVEF  55% w/o WMA, MPI done 2015 negative for ischemia.       Degenerative joint disease (DJD) of lumbar spine    Diabetes mellitus (HCC)    Diverticulitis    Edema    Essential hypertension    Fibromyalgia    GERD (gastroesophageal reflux disease)    Insomnia    Leukemia (HCC)    Muscle spasm    Obesity    Obstructive sleep apnea    Old myocardial infarction    Snoring     Past Surgical History:   Procedure Laterality Date    Appendectomy      Cabg, arterial, four+          Cholecystectomy      Colonoscopy      Hysterectomy      partial, not due to cancer    Oophorectomy Right     noncancer,     Family History   Problem Relation Age of Onset    Cancer Paternal Grandfather         pancreatic ca     Other (malignant neoplasm of pancrease) Paternal Grandfather     Stroke Brother     Other (lupus anticoagulant) Brother       reports that she quit smoking about 18 years ago. Her smoking use included cigarettes. She has never used smokeless tobacco. She reports current drug use. Drug: Cannabis. She reports that she does not drink alcohol.  Allergies   Allergen Reactions    Amlodipine ANAPHYLAXIS    Clopidogrel WHEEZING and RASH    Metoclopramide HIVES and OTHER (SEE COMMENTS)     Cerner Allergy Text Annotation: Reglan, Cerner Reaction: hyper  Cerner Allergy Text Annotation: Reglan, Cerner Reaction: hyper      Mirtazapine Coughing, PALPITATIONS, Runny nose and SWELLING    Tramadol UNKNOWN    Aspirin ITCHING    Ciprofloxacin NAUSEA AND VOMITING and OTHER (SEE COMMENTS)     Cerner Allergy Text Annotation: ciprofloxacin, Cerner Reaction: vomit      Niacin And Related UNKNOWN     Other reaction(s): Rash    Opioid Analgesics OTHER (SEE COMMENTS), UNKNOWN and NAUSEA AND VOMITING     Per pt  Cerner Allergy Text Annotation: morphine, Cerner Reaction: chest pain      Sulfamethoxazole W/Trimethoprim NAUSEA AND VOMITING    Amoxicillin-Pot Clavulanate OTHER (SEE COMMENTS)     Upset her stomach with Augmentin.  Patient took amoxicillin without problem.    Gabapentin OTHER (SEE COMMENTS)    Sertraline UNKNOWN     Cerner Allergy Text Annotation: Zoloft      Trazodone UNKNOWN    Sulfa Antibiotics OTHER (SEE COMMENTS) and RASH     Cerner Allergy Text Annotation: sulfa drugs, Cerner Reaction: hives         MEDICATIONS:       Current Facility-Administered Medications:     dextrose 5% infusion, , Intravenous, Continuous    heparin (Porcine) 5000 UNIT/ML injection 5,000 Units, 5,000 Units, Subcutaneous, Q8H JUAN ANTONIO    acetaminophen (Tylenol Extra Strength) tab 500 mg, 500 mg, Oral, Q4H PRN    melatonin tab 3 mg, 3 mg, Oral, Nightly PRN    polyethylene glycol (PEG 3350) (Miralax) 17 g oral packet 17 g, 17 g, Oral, Daily PRN    sennosides  (Senokot) tab 17.2 mg, 17.2 mg, Oral, Nightly PRN    bisacodyl (Dulcolax) 10 MG rectal suppository 10 mg, 10 mg, Rectal, Daily PRN    fleet enema (Fleet) rectal enema 133 mL, 1 enema, Rectal, Once PRN    ondansetron (Zofran) 4 MG/2ML injection 4 mg, 4 mg, Intravenous, Q6H PRN    prochlorperazine (Compazine) 10 MG/2ML injection 5 mg, 5 mg, Intravenous, Q8H PRN    aspirin DR tab 81 mg, 81 mg, Oral, Daily    atorvastatin (Lipitor) tab 80 mg, 80 mg, Oral, Nightly    carvedilol (Coreg) tab 3.125 mg, 3.125 mg, Oral, 2x Daily(Beta Blocker)    clonazePAM (KlonoPIN) tab 1 mg, 1 mg, Oral, BID    zolpidem (Ambien) tab 10 mg, 10 mg, Oral, Nightly    Medications Prior to Admission   Medication Sig Dispense Refill Last Dose    ondansetron (ZOFRAN) 8 MG tablet Take 1 tablet (8 mg total) by mouth every 8 (eight) hours as needed for Nausea. 30 tablet 3 Taking    LISINOPRIL 20 MG Oral Tab Take 1 tablet (20 mg total) by mouth 2 (two) times daily. 60 tablet 0 2024    FLUoxetine HCl 20 MG Oral Tab Take 1 tablet (20 mg total) by mouth daily.   2024    atorvastatin 80 MG Oral Tab Take 1 tablet (80 mg total) by mouth nightly.   9/15/2024    carvedilol 3.125 MG Oral Tab Take 1 tablet (3.125 mg total) by mouth 2 (two) times daily with meals.   2024    aspirin 81 MG Oral Tab EC Take 1 tablet (81 mg total) by mouth daily.   2024    Famotidine (PEPCID AC OR) Take 25 mg by mouth in the morning and 25 mg before bedtime.   2024    NON FORMULARY Medical cannabis as needed.   Taking    zolpidem 10 MG Oral Tab Take 1 tablet (10 mg total) by mouth daily. (Patient taking differently: Take 1 tablet (10 mg total) by mouth nightly.) 60 tablet 0 9/15/2024    clonazePAM 1 MG Oral Tab Take 1 tablet (1 mg total) by mouth 2 (two) times daily. 60 tablet 1 2024    Mometasone Furoate 0.1 % External Cream Apply 1 Application topically daily. 60 g 3 Taking    [] vancomycin 125 MG Oral Cap Take 1 capsule (125 mg total) by mouth 4  (four) times daily for 10 days. 40 capsule 0     [] ondansetron 4 MG Oral Tablet Dispersible Take 1 tablet (4 mg total) by mouth every 4 (four) hours as needed for Nausea. 10 tablet 0     Bosutinib 100 MG Oral Tab Take 400 mg by mouth daily. (Patient not taking: Reported on 2024) 120 tablet 11 Not Taking    HYDROcodone-acetaminophen 5-325 MG Oral Tab Take 1-2 tablets by mouth 3 (three) times daily as needed. 30 tablet 0 Unknown    POTASSIUM CHLORIDE ER 20 MEQ Oral Tab CR TAKE 1 TABLET BY MOUTH DAILY 90 tablet 11 Unknown    Triamcinolone Acetonide (NASACORT ALLERGY 24HR) 55 MCG/ACT Nasal Aerosol 2 sprays in each nostril every day (Patient not taking: Reported on 2024) 16.5 g 0 Not Taking        PHYSICAL EXAM:     Vital Signs: /69 (BP Location: Right arm)   Pulse 52   Temp 98.2 °F (36.8 °C) (Oral)   Resp 18   Ht 5' 3\" (1.6 m)   Wt 165 lb 4.8 oz (75 kg)   LMP  (LMP Unknown)   SpO2 95%   BMI 29.28 kg/m²   Temp (24hrs), Av.9 °F (36.6 °C), Min:97.5 °F (36.4 °C), Max:98.2 °F (36.8 °C)       Intake/Output Summary (Last 24 hours) at 2024 1310  Last data filed at 2024 1218  Gross per 24 hour   Intake 1240 ml   Output 1450 ml   Net -210 ml     Wt Readings from Last 3 Encounters:   24 165 lb 4.8 oz (75 kg)   24 166 lb 8 oz (75.5 kg)   24 180 lb (81.6 kg)       General: no acute distress  HEENT: normocephalic, atraumatic  CV: RRR  Respiratory: no respiratory distress  Abdomen: soft, non-tender  Extremities: no edema bilaterally  Skin: warm, dry  Neuro: awake, alert    LABORATORY DATA:     Lab Results   Component Value Date    GLU 97 2024    BUN 8 (L) 2024    BUNCREA 12.0 2022    CREATSERUM 0.70 2024    ANIONGAP 5 2024    CA 9.4 2024    OSMOCALC 266 (L) 2024    ALKPHO 99 2024    AST 27 2024    ALT 38 2024    BILT 0.4 2024    TP 7.1 2024    ALB 3.6 2024    GLOBULIN 3.5 2024      (L) 09/17/2024    K 4.1 09/17/2024    CL 97 (L) 09/17/2024    CO2 27.0 09/17/2024     Lab Results   Component Value Date    WBC 4.7 09/17/2024    RBC 4.87 09/17/2024    HGB 14.3 09/17/2024    HCT 42.3 09/17/2024    .0 09/17/2024    MCV 86.9 09/17/2024    MCH 29.4 09/17/2024    MCHC 33.8 09/17/2024    RDW 12.6 09/17/2024    NEPRELIM 2.13 09/17/2024    NEUTABS 20.15 (H) 06/07/2021    LYMPHABS 3.45 06/07/2021    EOSABS 0.86 (H) 06/07/2021    BASABS 1.15 (H) 06/07/2021    NEPERCENT 45.8 09/17/2024    LYPERCENT 36.6 09/17/2024    MOPERCENT 13.8 09/17/2024    EOPERCENT 3.2 09/17/2024    BAPERCENT 0.6 09/17/2024    NE 2.13 09/17/2024    LYMABS 1.70 09/17/2024    MOABSO 0.64 09/17/2024    EOABSO 0.15 09/17/2024    BAABSO 0.03 09/17/2024     Lab Results   Component Value Date    MALBP <1.2 04/06/2022    CREUR 58.20 09/16/2024     Lab Results   Component Value Date    COLORUR Yellow 08/13/2024    CLARITY Clear 08/13/2024    SPECGRAVITY 1.015 08/13/2024    GLUUR Negative 08/13/2024    BILUR Negative 08/13/2024    KETUR Negative 08/13/2024    BLOODURINE Negative 08/13/2024    PHURINE 6.0 08/13/2024    PROUR Negative 08/13/2024    UROBILINOGEN 0.2 08/13/2024    NITRITE Negative 08/13/2024    LEUUR Negative 08/13/2024    NMIC Microscopic not indicated 08/13/2024    BACUR Few 04/06/2022       IMAGING:     Reviewed    ASSESSMENT:     # Severe hypotonic hyponatremia  -Likely hypovolemic related to poor PO intake  -On admission 118  -Overcorrected to 129 s/p IVF    PLAN:     -Plan for D5W and repeat BMP this afternoon  -Goal sodium should be 124-126   -Please page nephrology if sodium <124 or >126  -Will likely start low rate IVF again after sodium is corrected  -Encourage PO intake - high protein diet  -Obtain urinalysis with urine lytes   -Okay to continue SSRI at this time   -Avoid nephrotoxins and renally dose medications for creatinine clearance  -Monitor intake and output daily  -Daily weights            We will continue  to follow.    Thank you for allowing us to participate in the care of this patient.         Marcus Brown, DO [facility] - Nephrology

## 2024-09-17 NOTE — PAYOR COMM NOTE
--------------  ADMISSION REVIEW     Payor: [employer]  Subscriber #:  [id number]  Authorization Number: [id number]    Admit date: 9/16/24  Admit time:  7:32 PM       REVIEW DOCUMENTATION:    Patient Seen in: [facility] [place]      History     Chief Complaint   Patient presents with    Abnormal Labs     Stated Complaint: sent from ca center due to low sodium    Subjective:   HPI    This is a 59-year-old woman, history of CLL sent from cancer center for evaluation of low sodium, 118.  Patient states was on chemotherapy up until about 2 weeks ago, states was having nausea, has had a poor appetite for the past 2 weeks, no vomiting or diarrhea.  Denies any excess water intake, any other complaints or concerns.    Objective:   Past Medical History:    Achalasia    Anxiety    Back pain    Chronic fatigue syndrome    Completed stroke (HCC)    Coronary atherosclerosis of native coronary artery    Overview:  Cath by DANIEL Gregg 06/13/2007 for R/S CP with radiation to her back and jaw.  RCA stent (done in [place]) . Tubular LAD/Diag stent of 80% was treated with kissing balloon. LVEF 50%.   CABG 07/30/2007 had CABG x4 by Dr JAYLYN Maher. KIMBLE-LAD, SVG-OM, SVG-PDA and SVG-Diag. Admitted with atypical CP Jul 2015, Trop (-), ECHO LVEF  55% w/o WMA, MPI done Jan 2015 negative for ischemia.       Degenerative joint disease (DJD) of lumbar spine    Diabetes mellitus (HCC)    Diverticulitis    Edema    Essential hypertension    Fibromyalgia    GERD (gastroesophageal reflux disease)    Insomnia    Leukemia (HCC)    Muscle spasm    Obesity    Obstructive sleep apnea    Old myocardial infarction    Snoring     Past Surgical History:   Procedure Laterality Date    Appendectomy      Cabg, arterial, four+      2007    Cholecystectomy      Colonoscopy      Hysterectomy      partial, not due to cancer    Oophorectomy Right     noncancer,       Physical Exam     ED Triage Vitals [09/16/24 1506]   /75   Pulse 59   Resp  18   Temp 98.1 °F (36.7 °C)   Temp src Oral   SpO2 97 %   O2 Device None (Room air)       Current Vitals:   Vital Signs  BP: 149/60  Pulse: 52  Resp: 18  Temp: 97.9 °F (36.6 °C)  Temp src: Temporal    Oxygen Therapy  SpO2: 100 %  O2 Device: None (Room air)    Physical Exam  Vitals signs and nursing note reviewed.   General:  Patient laying supine in the bed in no acute distress  Head: Normocephalic and atraumatic.   HEENT:  Mucous membranes are moist.   Cardiovascular:  Normal rate and regular rhythm.  No Edema  Pulmonary:  Pulmonary effort is normal.  Normal breath sounds. No wheezing, rhonchi or rales.   Abdominal: Soft nontender nondistended, normal bowel sounds, no guarding no rebound tenderness  Skin: Warm and dry  Neurological: Awake alert, speech is normal    ED Course     Labs Reviewed   COMP METABOLIC PANEL (14) - Abnormal; Notable for the following components:       Result Value    Sodium 120 (*)     Chloride 88 (*)     Calculated Osmolality 248 (*)     All other components within normal limits   MAGNESIUM - Normal      Disposition and Plan     Clinical Impression:  1. Hyponatremia         Disposition:  Admit  9/16/2024  4:19 pm    Signed by Yves Palafox MD on 9/16/2024  6:42 PM           NEPHROLOGY CONSULT    9-17-24     Reason for consult: Hyponatremia  Date of consultation: 9/17/2024      HISTORY OF PRESENT ILLNESS:      Dyana Souza is a 59 year old female with a medical history notable for CLL being treated with Bosutinib (recently held since 8/24), who presents with hyponatremia. Noted to have poor PO intake due to nausea over the past couple of weeks. Sodium 118 on admission. Nephrology consulted for further workup and management.     General: no acute distress  HEENT: normocephalic, atraumatic  CV: RRR  Respiratory: no respiratory distress  Abdomen: soft, non-tender  Extremities: no edema bilaterally  Skin: warm, dry  Neuro: awake, alert     LABORATORY DATA:            Lab Results   Component  Value Date     GLU 97 09/17/2024     BUN 8 (L) 09/17/2024     BUNCREA 12.0 04/06/2022     CREATSERUM 0.70 09/17/2024     ANIONGAP 5 09/17/2024     CA 9.4 09/17/2024     OSMOCALC 266 (L) 09/17/2024     ALKPHO 99 09/16/2024     AST 27 09/16/2024     ALT 38 09/16/2024     BILT 0.4 09/16/2024     TP 7.1 09/16/2024     ALB 3.6 09/16/2024     GLOBULIN 3.5 09/16/2024      (L) 09/17/2024     K 4.1 09/17/2024     CL 97 (L) 09/17/2024     CO2 27.0 09/17/2024            Lab Results   Component Value Date     WBC 4.7 09/17/2024     RBC 4.87 09/17/2024     HGB 14.3 09/17/2024     HCT 42.3 09/17/2024     .0 09/17/2024     MCV 86.9 09/17/2024     MCH 29.4 09/17/2024     MCHC 33.8 09/17/2024     RDW 12.6 09/17/2024     NEPRELIM 2.13 09/17/2024     NEUTABS 20.15 (H) 06/07/2021     LYMPHABS 3.45 06/07/2021     EOSABS 0.86 (H) 06/07/2021     BASABS 1.15 (H) 06/07/2021     NEPERCENT 45.8 09/17/2024     LYPERCENT 36.6 09/17/2024     MOPERCENT 13.8 09/17/2024     EOPERCENT 3.2 09/17/2024     BAPERCENT 0.6 09/17/2024     NE 2.13 09/17/2024     LYMABS 1.70 09/17/2024     MOABSO 0.64 09/17/2024     EOABSO 0.15 09/17/2024     BAABSO 0.03 09/17/2024     ASSESSMENT:      # Severe hypotonic hyponatremia  -Likely hypovolemic related to poor PO intake  -On admission 118  -Overcorrected to 129 s/p IVF     PLAN:      -Plan for D5W and repeat BMP this afternoon  -Goal sodium should be 124-126   -Please page nephrology if sodium <124 or >126  -Will likely start low rate IVF again after sodium is corrected  -Encourage PO intake - high protein diet  -Obtain urinalysis with urine lytes   -Okay to continue SSRI at this time   -Avoid nephrotoxins and renally dose medications for creatinine clearance  -Monitor intake and output daily  -Daily weights         MEDICATIONS ADMINISTERED IN LAST 1 DAY:  aspirin DR tab 81 mg       Date Action Dose Route User    9/17/2024 1130 Given 81 mg Oral Lizeth Hernandez, RN          atorvastatin (Lipitor)  tab 80 mg       Date Action Dose Route User    9/16/2024 2017 Given 80 mg Oral Ioana Solis RN          clonazePAM (KlonoPIN) tab 1 mg       Date Action Dose Route User    9/17/2024 1130 Given 1 mg Oral Lizeth Hernandez RN    9/16/2024 2045 Given 1 mg Oral Ioana Solis RN          dextrose 5% infusion 125 ML HR       Date Action Dose Route User    9/17/2024 1134 New Bag (none) Intravenous Lizeth Hernandez RN          dextrose 5% infusion       Date Action Dose Route User    9/17/2024 1130 New Bag (none) Intravenous Lizeth Hernandez RN          heparin (Porcine) 5000 UNIT/ML injection 5,000 Units       Date Action Dose Route User    9/17/2024 0509 Given 5,000 Units Subcutaneous (Left Lower Abdomen) Ioana Solis RN    9/16/2024 2100 Given 5,000 Units Subcutaneous (Left Lower Abdomen) Ioana Solis RN          sodium chloride 0.9 % IV bolus 1,000 mL       Date Action Dose Route User    9/16/2024 1642 New Bag 1,000 mL Intravenous Bryan Sheikh RN          zolpidem (Ambien) tab 10 mg       Date Action Dose Route User    9/16/2024 2017 Given 10 mg Oral Ioana Solis RN            Vitals (last day)       Date/Time Temp Pulse Resp BP SpO2 Weight O2 Device O2 Flow Rate (L/min) [facility]    09/17/24 1218 98.2 °F (36.8 °C) 52 18 137/69 95 % -- None (Room air) -- IS    09/17/24 1030 -- 72 -- -- -- -- -- -- IS    09/17/24 0830 97.9 °F (36.6 °C) 53 18 132/65 98 % -- None (Room air) -- IS    09/17/24 0406 97.8 °F (36.6 °C) 55 18 130/64 98 % -- None (Room air) -- MG    09/16/24 2336 97.5 °F (36.4 °C) 51 18 116/65 97 % -- None (Room air) -- MG    09/16/24 2302 -- 51 -- -- -- -- -- -- MG    09/16/24 1941 97.9 °F (36.6 °C) 53 18 133/64 96 % 165 lb 4.8 oz (75 kg) None (Room air) --     09/16/24 1807 97.9 °F (36.6 °C) 52 18 149/60 100 % -- None (Room air) --     09/16/24 1730 -- 50 18 143/69 100 % -- None (Room air) --     09/16/24 1553 -- 58 18 132/72 97 % -- None (Room air) --     09/16/24 1506 98.1 °F (36.7 °C) 59 18  128/75 97 % 166 lb (75.3 kg) None (Room air) -- MH

## 2024-09-17 NOTE — DIETARY NOTE
[facility]   part of [facility]   CLINICAL NUTRITION    Dyana Souza     Admitting diagnosis:  Hyponatremia [E87.1]    Ht: 160 cm (5' 3\")  Wt: 75 kg (165 lb 4.8 oz).   Body mass index is 29.28 kg/m².  IBW: 52.3kg    Wt Readings from Last 6 Encounters:   09/16/24 75 kg (165 lb 4.8 oz)   09/16/24 75.5 kg (166 lb 8 oz)   08/13/24 81.6 kg (180 lb)   05/20/24 75.6 kg (166 lb 9.6 oz)   02/10/22 78.5 kg (173 lb)   03/26/21 86.2 kg (190 lb)        Labs/Meds reviewed    Diet:       Procedures    Regular/General diet Is Patient on Accuchecks? No     Percent Meals Eaten (last 3 days)       None          59 year old female admitted with low sodium.  Pt with CLL, pt with n/v from last treatment and associated decreased po    Pt chart reviewed d/t at risk.  Pt reports feeling much better and good appetite/improved intake.   Patient reports good appetite at this time.  Pt agreeable to ONS would like to try Ensure and Jaylyn Dupont.   Tolerating po diet without diarrhea, emesis, or constipation.   No significant weight changes noted.     Patient is at low nutrition risk at this time.    Please consult if patient status changes or nutrition issues arise.    Celia Hugo,RD,LDN  Clinical Dietitian  [phone]

## 2024-09-17 NOTE — PLAN OF CARE
NURSING ADMISSION NOTE    Patient admitted via Ambulance  Oriented to room.  Safety precautions initiated.  Bed in low position.  Call light in reach.    Pt received from [facility]. Pt a/o x4. VSS on RA. No c/o pain. Meds per MAR. Nephro updated on Na 125. No acute events overnight.     Fall risk protocol followed, call light within reach.

## 2024-09-18 LAB
ANION GAP SERPL CALC-SCNC: 6 MMOL/L (ref 0–18)
ANION GAP SERPL CALC-SCNC: 7 MMOL/L (ref 0–18)
ANION GAP SERPL CALC-SCNC: 9 MMOL/L (ref 0–18)
BASOPHILS # BLD AUTO: 0.04 X10(3) UL (ref 0–0.2)
BASOPHILS NFR BLD AUTO: 0.8 %
BUN BLD-MCNC: 7 MG/DL (ref 9–23)
BUN BLD-MCNC: 7 MG/DL (ref 9–23)
BUN BLD-MCNC: 9 MG/DL (ref 9–23)
CALCIUM BLD-MCNC: 9 MG/DL (ref 8.7–10.4)
CALCIUM BLD-MCNC: 9.4 MG/DL (ref 8.7–10.4)
CALCIUM BLD-MCNC: 9.5 MG/DL (ref 8.7–10.4)
CHLORIDE SERPL-SCNC: 101 MMOL/L (ref 98–112)
CHLORIDE SERPL-SCNC: 98 MMOL/L (ref 98–112)
CHLORIDE SERPL-SCNC: 98 MMOL/L (ref 98–112)
CO2 SERPL-SCNC: 22 MMOL/L (ref 21–32)
CO2 SERPL-SCNC: 24 MMOL/L (ref 21–32)
CO2 SERPL-SCNC: 26 MMOL/L (ref 21–32)
CREAT BLD-MCNC: 0.62 MG/DL
CREAT BLD-MCNC: 0.71 MG/DL
CREAT BLD-MCNC: 0.75 MG/DL
EGFRCR SERPLBLD CKD-EPI 2021: 103 ML/MIN/1.73M2 (ref 60–?)
EGFRCR SERPLBLD CKD-EPI 2021: 92 ML/MIN/1.73M2 (ref 60–?)
EGFRCR SERPLBLD CKD-EPI 2021: 98 ML/MIN/1.73M2 (ref 60–?)
EOSINOPHIL # BLD AUTO: 0.13 X10(3) UL (ref 0–0.7)
EOSINOPHIL NFR BLD AUTO: 2.5 %
ERYTHROCYTE [DISTWIDTH] IN BLOOD BY AUTOMATED COUNT: 12.5 %
GLUCOSE BLD-MCNC: 88 MG/DL (ref 70–99)
GLUCOSE BLD-MCNC: 93 MG/DL (ref 70–99)
GLUCOSE BLD-MCNC: 94 MG/DL (ref 70–99)
HCT VFR BLD AUTO: 41.5 %
HGB BLD-MCNC: 14.2 G/DL
IMM GRANULOCYTES # BLD AUTO: 0.01 X10(3) UL (ref 0–1)
IMM GRANULOCYTES NFR BLD: 0.2 %
LYMPHOCYTES # BLD AUTO: 1.42 X10(3) UL (ref 1–4)
LYMPHOCYTES NFR BLD AUTO: 27.3 %
MCH RBC QN AUTO: 29.6 PG (ref 26–34)
MCHC RBC AUTO-ENTMCNC: 34.2 G/DL (ref 31–37)
MCV RBC AUTO: 86.6 FL
MONOCYTES # BLD AUTO: 0.65 X10(3) UL (ref 0.1–1)
MONOCYTES NFR BLD AUTO: 12.5 %
NEUTROPHILS # BLD AUTO: 2.95 X10 (3) UL (ref 1.5–7.7)
NEUTROPHILS # BLD AUTO: 2.95 X10(3) UL (ref 1.5–7.7)
NEUTROPHILS NFR BLD AUTO: 56.7 %
OSMOLALITY SERPL CALC.SUM OF ELEC: 265 MOSM/KG (ref 275–295)
OSMOLALITY SERPL CALC.SUM OF ELEC: 268 MOSM/KG (ref 275–295)
OSMOLALITY SERPL CALC.SUM OF ELEC: 272 MOSM/KG (ref 275–295)
PLATELET # BLD AUTO: 265 10(3)UL (ref 150–450)
POTASSIUM SERPL-SCNC: 3.9 MMOL/L (ref 3.5–5.1)
POTASSIUM SERPL-SCNC: 4 MMOL/L (ref 3.5–5.1)
POTASSIUM SERPL-SCNC: 4.4 MMOL/L (ref 3.5–5.1)
RBC # BLD AUTO: 4.79 X10(6)UL
SODIUM SERPL-SCNC: 129 MMOL/L (ref 136–145)
SODIUM SERPL-SCNC: 130 MMOL/L (ref 136–145)
SODIUM SERPL-SCNC: 132 MMOL/L (ref 136–145)
WBC # BLD AUTO: 5.2 X10(3) UL (ref 4–11)

## 2024-09-18 PROCEDURE — 80048 BASIC METABOLIC PNL TOTAL CA: CPT | Performed by: STUDENT IN AN ORGANIZED HEALTH CARE EDUCATION/TRAINING PROGRAM

## 2024-09-18 PROCEDURE — 85025 COMPLETE CBC W/AUTO DIFF WBC: CPT | Performed by: STUDENT IN AN ORGANIZED HEALTH CARE EDUCATION/TRAINING PROGRAM

## 2024-09-18 RX ORDER — FLUOXETINE 10 MG/1
20 TABLET, FILM COATED ORAL DAILY
Status: DISCONTINUED | OUTPATIENT
Start: 2024-09-18 | End: 2024-09-19

## 2024-09-18 RX ORDER — DEXTROSE MONOHYDRATE 50 MG/ML
INJECTION, SOLUTION INTRAVENOUS CONTINUOUS
Status: DISCONTINUED | OUTPATIENT
Start: 2024-09-18 | End: 2024-09-18

## 2024-09-18 RX ORDER — LISINOPRIL 20 MG/1
20 TABLET ORAL DAILY
Status: DISCONTINUED | OUTPATIENT
Start: 2024-09-18 | End: 2024-09-19

## 2024-09-18 RX ORDER — DEXTROSE MONOHYDRATE 50 MG/ML
INJECTION, SOLUTION INTRAVENOUS CONTINUOUS
Status: ACTIVE | OUTPATIENT
Start: 2024-09-18 | End: 2024-09-18

## 2024-09-18 NOTE — PLAN OF CARE
Appears comfortable. Resting in bed.No complaints voiced.Appetite improved,better intake during meals. D5 at 125cc/hr resumed for 4H as ordered,repeat lab at 1400. Will continue to monitor. Call light in reach.     Problem: METABOLIC/FLUID AND ELECTROLYTES - ADULT  Goal: Electrolytes maintained within normal limits  Description: INTERVENTIONS:  - Monitor labs and rhythm and assess patient for signs and symptoms of electrolyte imbalances  - Administer electrolyte replacement as ordered  - Monitor response to electrolyte replacements, including rhythm and repeat lab results as appropriate  - Fluid restriction as ordered  - Instruct patient on fluid and nutrition restrictions as appropriate  Outcome: Progressing

## 2024-09-18 NOTE — PROGRESS NOTES
[facility] Hospitalist Progress Note     CC: Hospital Follow up    PCP: Vasquez Hilliard MD       Assessment/Plan:     Principal Problem:    Hyponatremia          Patient is a 59 year old female with PMH sig for CLL on bosutinib, recent C. difficile, anxiety, hypertension, diabetes, CAD who was sent from her oncologist office for abnormal lab work.  Patient managed for hyponatremia.        Hyponatremia  - Likely hypovolemic in the setting of poor oral intake and chemotherapy/diarrhea  - Sodium 120 on admission, now at 132  - Plan for repeat D5W today with BMP assessment in the afternoon  - Urine lites   - Okay to continue SSRIs per renal  - Nephrology on consult, case discussed     CLL  - Previously on bosutinib, stopped in August given C. difficile  - Per nephrology notes, plan was to resume it if C. difficile is cleared  - Will defer this to outpatient at this time     Generalized anxiety disorder  - Clonazepam per home regimen  -Resume fluoxetine     Hypertension  - Holding lisinopril, resume Coreg     Remote history of A-fib  - Continue Coreg     FN:  - IVF: D5W  - Diet: General     DVT Prophy: SCDs, heparin subcu  Atrophy: Ambulate as tolerated  Lines: PIV     Dispo: pending clinical course     Outpatient records or previous hospital records reviewed.      Further recommendations pending patient's clinical course.  [facility] hospitalist to continue to follow patient while in house     Patient and/or patient's family given opportunity to ask questions and note understanding and agreeing with therapeutic plan as outlined     Thank You,  DO Laurence Arriaga Hospitalist  Answering Service number: [phone]     Subjective:     No CP, SOB, or N/V.  Feels much better today.  Urinating quite a bit.    OBJECTIVE:    Blood pressure 134/73, pulse 74, temperature 98.3 °F (36.8 °C), temperature source Oral, resp. rate 18, height 5' 3\" (1.6 m), weight 165 lb 4.8 oz (75 kg), SpO2 100%, not currently breastfeeding.    Temp:  [98.1  °F (36.7 °C)-98.6 °F (37 °C)] 98.3 °F (36.8 °C)  Pulse:  [55-74] 74  Resp:  [18] 18  BP: (130-138)/(58-73) 134/73  SpO2:  [96 %-100 %] 100 %      Intake/Output:    Intake/Output Summary (Last 24 hours) at 9/18/2024 1358  Last data filed at 9/18/2024 1004  Gross per 24 hour   Intake 800 ml   Output 1400 ml   Net -600 ml       Last 3 Weights   09/16/24 1941 165 lb 4.8 oz (75 kg)   09/16/24 1506 166 lb (75.3 kg)   09/16/24 1337 166 lb 8 oz (75.5 kg)   08/13/24 1316 180 lb (81.6 kg)       Exam   Gen: No acute distress, alert and oriented x3, no focal neurologic deficits  Heent: NC AT, mucous memb clear, neck supple  Pulm: Lungs clear, normal respiratory effort  CV: Heart with regular rate and rhythm, no peripheral edema  Abd: Abdomen soft, nontender, nondistended, bowel sounds present  MSK: Full range of motion in extremities, no clubbing, no cyanosis  Skin: no rashes or lesions  Neuro: AO*3, motor intact, no sensory deficits  Psyc: appropriate mood and affect      Data Review:       Labs:     Recent Labs   Lab 09/16/24  1525 09/17/24  0623 09/18/24  0648   RBC 5.23 4.87 4.79   HGB 15.6 14.3 14.2   HCT 44.2 42.3 41.5   MCV 84.5 86.9 86.6   MCH 29.8 29.4 29.6   MCHC 35.3 33.8 34.2   RDW 12.5 12.6 12.5   NEPRELIM 3.27 2.13 2.95   WBC 5.7 4.7 5.2   .0 265.0 265.0         Recent Labs   Lab 09/17/24  1445 09/18/24  0007 09/18/24  0648   GLU 92 94 93   BUN 8* 9 7*   CREATSERUM 0.74 0.71 0.62   EGFRCR 93 98 103   CA 9.4 9.0 9.5   * 130* 132*   K 4.2 3.9 4.0   CL 97* 98 101   CO2 27.0 26.0 24.0       Recent Labs   Lab 09/16/24  1335 09/16/24  1525   ALT 39 38   AST 29 27   ALB 3.6 3.6         Imaging:  No results found.      Meds:      heparin  5,000 Units Subcutaneous Q8H JUAN ANTONIO    aspirin  81 mg Oral Daily    atorvastatin  80 mg Oral Nightly    carvedilol  3.125 mg Oral 2x Daily(Beta Blocker)    clonazePAM  1 mg Oral BID    zolpidem  10 mg Oral Nightly      dextrose 125 mL/hr at 09/18/24 1004       acetaminophen     melatonin    polyethylene glycol (PEG 3350)    sennosides    bisacodyl    fleet enema    ondansetron    prochlorperazine

## 2024-09-18 NOTE — PROGRESS NOTES
[facility] - Nephrology  Inpatient Follow-up    Dyana Souza Patient Status:  Inpatient    1965 MRN [id number]   Location [facility] 4NW-A Attending Jitendra Manzano,    Hosp Day # 2 PCP Vasquez Hilliard MD       SUBJECTIVE:     Patient seen and examined at bedside. No acute complaints today.     MEDICATIONS:     Current Facility-Administered Medications   Medication Dose Route Frequency    dextrose 5% infusion   Intravenous Continuous    heparin (Porcine) 5000 UNIT/ML injection 5,000 Units  5,000 Units Subcutaneous Q8H JUAN ANTONIO    acetaminophen (Tylenol Extra Strength) tab 500 mg  500 mg Oral Q4H PRN    melatonin tab 3 mg  3 mg Oral Nightly PRN    polyethylene glycol (PEG 3350) (Miralax) 17 g oral packet 17 g  17 g Oral Daily PRN    sennosides (Senokot) tab 17.2 mg  17.2 mg Oral Nightly PRN    bisacodyl (Dulcolax) 10 MG rectal suppository 10 mg  10 mg Rectal Daily PRN    fleet enema (Fleet) rectal enema 133 mL  1 enema Rectal Once PRN    ondansetron (Zofran) 4 MG/2ML injection 4 mg  4 mg Intravenous Q6H PRN    prochlorperazine (Compazine) 10 MG/2ML injection 5 mg  5 mg Intravenous Q8H PRN    aspirin DR tab 81 mg  81 mg Oral Daily    atorvastatin (Lipitor) tab 80 mg  80 mg Oral Nightly    carvedilol (Coreg) tab 3.125 mg  3.125 mg Oral 2x Daily(Beta Blocker)    clonazePAM (KlonoPIN) tab 1 mg  1 mg Oral BID    zolpidem (Ambien) tab 10 mg  10 mg Oral Nightly       PHYSICAL EXAM:     Vital Signs: /73 (BP Location: Right arm)   Pulse 74   Temp 98.3 °F (36.8 °C) (Oral)   Resp 18   Ht 5' 3\" (1.6 m)   Wt 165 lb 4.8 oz (75 kg)   LMP  (LMP Unknown)   SpO2 100%   BMI 29.28 kg/m²   Temp (24hrs), Av.4 °F (36.9 °C), Min:98.1 °F (36.7 °C), Max:98.6 °F (37 °C)       Intake/Output Summary (Last 24 hours) at 2024 1226  Last data filed at 2024 1004  Gross per 24 hour   Intake 920 ml   Output 1400 ml   Net -480 ml     Wt Readings from Last 3 Encounters:   24 165 lb 4.8 oz (75 kg)    09/16/24 166 lb 8 oz (75.5 kg)   08/13/24 180 lb (81.6 kg)       General: no acute distress  HEENT: normocephalic, atraumatic  CV: RRR  Respiratory: no distress  Abdomen: soft, non-tender  Extremities: no edema bilaterally  Skin: warm, dry  Neuro: awake, alert     LABORATORY DATA:     Lab Results   Component Value Date    GLU 93 09/18/2024    BUN 7 (L) 09/18/2024    BUNCREA 12.0 04/06/2022    CREATSERUM 0.62 09/18/2024    ANIONGAP 7 09/18/2024    CA 9.5 09/18/2024    OSMOCALC 272 (L) 09/18/2024    ALKPHO 99 09/16/2024    AST 27 09/16/2024    ALT 38 09/16/2024    BILT 0.4 09/16/2024    TP 7.1 09/16/2024    ALB 3.6 09/16/2024    GLOBULIN 3.5 09/16/2024     (L) 09/18/2024    K 4.0 09/18/2024     09/18/2024    CO2 24.0 09/18/2024     Lab Results   Component Value Date    WBC 5.2 09/18/2024    RBC 4.79 09/18/2024    HGB 14.2 09/18/2024    HCT 41.5 09/18/2024    .0 09/18/2024    MCV 86.6 09/18/2024    MCH 29.6 09/18/2024    MCHC 34.2 09/18/2024    RDW 12.5 09/18/2024    NEPRELIM 2.95 09/18/2024    NEUTABS 20.15 (H) 06/07/2021    LYMPHABS 3.45 06/07/2021    EOSABS 0.86 (H) 06/07/2021    BASABS 1.15 (H) 06/07/2021    NEPERCENT 56.7 09/18/2024    LYPERCENT 27.3 09/18/2024    MOPERCENT 12.5 09/18/2024    EOPERCENT 2.5 09/18/2024    BAPERCENT 0.8 09/18/2024    NE 2.95 09/18/2024    LYMABS 1.42 09/18/2024    MOABSO 0.65 09/18/2024    EOABSO 0.13 09/18/2024    BAABSO 0.04 09/18/2024     Lab Results   Component Value Date    MALBP <1.2 04/06/2022    CREUR 58.20 09/16/2024     Lab Results   Component Value Date    COLORUR Yellow 08/13/2024    CLARITY Clear 08/13/2024    SPECGRAVITY 1.015 08/13/2024    GLUUR Negative 08/13/2024    BILUR Negative 08/13/2024    KETUR Negative 08/13/2024    BLOODURINE Negative 08/13/2024    PHURINE 6.0 08/13/2024    PROUR Negative 08/13/2024    UROBILINOGEN 0.2 08/13/2024    NITRITE Negative 08/13/2024    LEUUR Negative 08/13/2024    NMIC Microscopic not indicated 08/13/2024     MARIA T Garcia 04/06/2022       IMAGING:     Reviewed    ASSESSMENT:      # Severe hypotonic hyponatremia  -Likely hypovolemic related to poor PO intake  -On admission 118  -Overcorrected s/p IVF     PLAN:      -Plan for D5W and repeat BMP this afternoon  -Goal sodium should be about 128-130.  -Will allow sodium to improve naturally afterwards  -Encourage PO intake - high protein diet  -Okay to continue SSRI at this time   -Avoid nephrotoxins and renally dose medications for creatinine clearance  -Monitor intake and output daily  -Daily weights           We will continue to follow.    Thank you for allowing us to participate in the care of this patient.       Marcus Brown [facility] - Nephrology

## 2024-09-18 NOTE — PLAN OF CARE
Pt received A&Ox4. VSS. RA. Tele. Afebrile. Denies pain. Medications given per MAR. Na redraw at 130, nephrology notified - no new orders. Call light within reach. Fall precautions in place.

## 2024-09-19 VITALS
BODY MASS INDEX: 29.29 KG/M2 | HEIGHT: 63 IN | HEART RATE: 57 BPM | TEMPERATURE: 98 F | WEIGHT: 165.31 LBS | SYSTOLIC BLOOD PRESSURE: 117 MMHG | DIASTOLIC BLOOD PRESSURE: 65 MMHG | RESPIRATION RATE: 22 BRPM | OXYGEN SATURATION: 94 %

## 2024-09-19 LAB
ANION GAP SERPL CALC-SCNC: 8 MMOL/L (ref 0–18)
BASOPHILS # BLD AUTO: 0.04 X10(3) UL (ref 0–0.2)
BASOPHILS NFR BLD AUTO: 0.8 %
BUN BLD-MCNC: 11 MG/DL (ref 9–23)
CALCIUM BLD-MCNC: 9.3 MG/DL (ref 8.7–10.4)
CHLORIDE SERPL-SCNC: 99 MMOL/L (ref 98–112)
CO2 SERPL-SCNC: 25 MMOL/L (ref 21–32)
CREAT BLD-MCNC: 0.63 MG/DL
EGFRCR SERPLBLD CKD-EPI 2021: 102 ML/MIN/1.73M2 (ref 60–?)
EOSINOPHIL # BLD AUTO: 0.15 X10(3) UL (ref 0–0.7)
EOSINOPHIL NFR BLD AUTO: 3.1 %
ERYTHROCYTE [DISTWIDTH] IN BLOOD BY AUTOMATED COUNT: 12.5 %
GLUCOSE BLD-MCNC: 91 MG/DL (ref 70–99)
HCT VFR BLD AUTO: 39.6 %
HGB BLD-MCNC: 13.7 G/DL
IMM GRANULOCYTES # BLD AUTO: 0.01 X10(3) UL (ref 0–1)
IMM GRANULOCYTES NFR BLD: 0.2 %
LYMPHOCYTES # BLD AUTO: 1.57 X10(3) UL (ref 1–4)
LYMPHOCYTES NFR BLD AUTO: 32 %
MCH RBC QN AUTO: 29.5 PG (ref 26–34)
MCHC RBC AUTO-ENTMCNC: 34.6 G/DL (ref 31–37)
MCV RBC AUTO: 85.3 FL
MONOCYTES # BLD AUTO: 0.59 X10(3) UL (ref 0.1–1)
MONOCYTES NFR BLD AUTO: 12 %
NEUTROPHILS # BLD AUTO: 2.55 X10 (3) UL (ref 1.5–7.7)
NEUTROPHILS # BLD AUTO: 2.55 X10(3) UL (ref 1.5–7.7)
NEUTROPHILS NFR BLD AUTO: 51.9 %
OSMOLALITY SERPL CALC.SUM OF ELEC: 273 MOSM/KG (ref 275–295)
PLATELET # BLD AUTO: 240 10(3)UL (ref 150–450)
POTASSIUM SERPL-SCNC: 4 MMOL/L (ref 3.5–5.1)
RBC # BLD AUTO: 4.64 X10(6)UL
SODIUM SERPL-SCNC: 132 MMOL/L (ref 136–145)
WBC # BLD AUTO: 4.9 X10(3) UL (ref 4–11)

## 2024-09-19 PROCEDURE — 85025 COMPLETE CBC W/AUTO DIFF WBC: CPT | Performed by: STUDENT IN AN ORGANIZED HEALTH CARE EDUCATION/TRAINING PROGRAM

## 2024-09-19 PROCEDURE — 80048 BASIC METABOLIC PNL TOTAL CA: CPT | Performed by: STUDENT IN AN ORGANIZED HEALTH CARE EDUCATION/TRAINING PROGRAM

## 2024-09-19 NOTE — PLAN OF CARE
Pt alert/oriented x 4.  Na 132 today.  Lungs clear.  Denies pain.  Vitals stable.  Scheduled meds given per MAR.  Updated pt re: plan of care, verbalizes understanding.  Cleared by hospitalist and nephrology for discharge today.  Safety measures in place.     Problem: METABOLIC/FLUID AND ELECTROLYTES - ADULT  Goal: Electrolytes maintained within normal limits  Description: INTERVENTIONS:  - Monitor labs and rhythm and assess patient for signs and symptoms of electrolyte imbalances  - Administer electrolyte replacement as ordered  - Monitor response to electrolyte replacements, including rhythm and repeat lab results as appropriate  - Fluid restriction as ordered  - Instruct patient on fluid and nutrition restrictions as appropriate  Outcome: Progressing  Goal: Hemodynamic stability and optimal renal function maintained  Description: INTERVENTIONS:  - Monitor labs and assess for signs and symptoms of volume excess or deficit  - Monitor intake, output and patient weight  - Monitor urine specific gravity, serum osmolarity and serum sodium as indicated or ordered  - Monitor response to interventions for patient's volume status, including labs, urine output, blood pressure (other measures as available)  - Encourage oral intake as appropriate  - Instruct patient on fluid and nutrition restrictions as appropriate  Outcome: Progressing

## 2024-09-19 NOTE — PROGRESS NOTES
NURSING DISCHARGE NOTE    Discharged Home via Wheelchair.  Accompanied by Spouse  Belongings Taken by patient/family.  Discharge instructions discussed with pt and her .  Verbalizes understanding.  Printed AVS given .

## 2024-09-19 NOTE — PLAN OF CARE
Pt received A&Ox4. VSS. RA. Tele. Afebrile. Denies pain. Medications given per MAR. Call light within reach.     Problem: METABOLIC/FLUID AND ELECTROLYTES - ADULT  Goal: Electrolytes maintained within normal limits  Description: INTERVENTIONS:  - Monitor labs and rhythm and assess patient for signs and symptoms of electrolyte imbalances  - Administer electrolyte replacement as ordered  - Monitor response to electrolyte replacements, including rhythm and repeat lab results as appropriate  - Fluid restriction as ordered  - Instruct patient on fluid and nutrition restrictions as appropriate  Outcome: Progressing  Goal: Hemodynamic stability and optimal renal function maintained  Description: INTERVENTIONS:  - Monitor labs and assess for signs and symptoms of volume excess or deficit  - Monitor intake, output and patient weight  - Monitor urine specific gravity, serum osmolarity and serum sodium as indicated or ordered  - Monitor response to interventions for patient's volume status, including labs, urine output, blood pressure (other measures as available)  - Encourage oral intake as appropriate  - Instruct patient on fluid and nutrition restrictions as appropriate  Outcome: Progressing

## 2024-09-19 NOTE — PROGRESS NOTES
[facility] - Nephrology  Inpatient Follow-up    Dyana Souza Patient Status:  Inpatient    1965 MRN [id number]   Location [facility] 4NW-A Attending Jitendra Manzano,    Hosp Day # 3 PCP Vasquez Hilliard MD       SUBJECTIVE:     Patient seen and examined at bedside. No acute complaints today.     MEDICATIONS:     Current Facility-Administered Medications   Medication Dose Route Frequency    lisinopril (Prinivil; Zestril) tab 20 mg  20 mg Oral Daily    FLUoxetine (PROzac) tab 20 mg  20 mg Oral Daily    heparin (Porcine) 5000 UNIT/ML injection 5,000 Units  5,000 Units Subcutaneous Q8H JUAN ANTONIO    acetaminophen (Tylenol Extra Strength) tab 500 mg  500 mg Oral Q4H PRN    melatonin tab 3 mg  3 mg Oral Nightly PRN    polyethylene glycol (PEG 3350) (Miralax) 17 g oral packet 17 g  17 g Oral Daily PRN    sennosides (Senokot) tab 17.2 mg  17.2 mg Oral Nightly PRN    bisacodyl (Dulcolax) 10 MG rectal suppository 10 mg  10 mg Rectal Daily PRN    fleet enema (Fleet) rectal enema 133 mL  1 enema Rectal Once PRN    ondansetron (Zofran) 4 MG/2ML injection 4 mg  4 mg Intravenous Q6H PRN    prochlorperazine (Compazine) 10 MG/2ML injection 5 mg  5 mg Intravenous Q8H PRN    aspirin DR tab 81 mg  81 mg Oral Daily    atorvastatin (Lipitor) tab 80 mg  80 mg Oral Nightly    carvedilol (Coreg) tab 3.125 mg  3.125 mg Oral 2x Daily(Beta Blocker)    clonazePAM (KlonoPIN) tab 1 mg  1 mg Oral BID    zolpidem (Ambien) tab 10 mg  10 mg Oral Nightly       PHYSICAL EXAM:     Vital Signs: /65 (BP Location: Right arm)   Pulse 57   Temp 98.3 °F (36.8 °C) (Oral)   Resp 22   Ht 5' 3\" (1.6 m)   Wt 165 lb 4.8 oz (75 kg)   LMP  (LMP Unknown)   SpO2 94%   BMI 29.28 kg/m²   Temp (24hrs), Av.1 °F (36.7 °C), Min:97.5 °F (36.4 °C), Max:98.8 °F (37.1 °C)       Intake/Output Summary (Last 24 hours) at 2024 1329  Last data filed at 2024 1157  Gross per 24 hour   Intake 540 ml   Output 1275 ml   Net -735 ml     Wt  Readings from Last 3 Encounters:   09/16/24 165 lb 4.8 oz (75 kg)   09/16/24 166 lb 8 oz (75.5 kg)   08/13/24 180 lb (81.6 kg)       General: no acute distress  HEENT: normocephalic, atraumatic  CV: RRR  Respiratory: no distress  Abdomen: soft, non-tender  Extremities: no edema bilaterally  Skin: warm, dry  Neuro: awake, alert     LABORATORY DATA:     Lab Results   Component Value Date    GLU 91 09/19/2024    BUN 11 09/19/2024    BUNCREA 12.0 04/06/2022    CREATSERUM 0.63 09/19/2024    ANIONGAP 8 09/19/2024    CA 9.3 09/19/2024    OSMOCALC 273 (L) 09/19/2024    ALKPHO 99 09/16/2024    AST 27 09/16/2024    ALT 38 09/16/2024    BILT 0.4 09/16/2024    TP 7.1 09/16/2024    ALB 3.6 09/16/2024    GLOBULIN 3.5 09/16/2024     (L) 09/19/2024    K 4.0 09/19/2024    CL 99 09/19/2024    CO2 25.0 09/19/2024     Lab Results   Component Value Date    WBC 4.9 09/19/2024    RBC 4.64 09/19/2024    HGB 13.7 09/19/2024    HCT 39.6 09/19/2024    .0 09/19/2024    MCV 85.3 09/19/2024    MCH 29.5 09/19/2024    MCHC 34.6 09/19/2024    RDW 12.5 09/19/2024    NEPRELIM 2.55 09/19/2024    NEUTABS 20.15 (H) 06/07/2021    LYMPHABS 3.45 06/07/2021    EOSABS 0.86 (H) 06/07/2021    BASABS 1.15 (H) 06/07/2021    NEPERCENT 51.9 09/19/2024    LYPERCENT 32.0 09/19/2024    MOPERCENT 12.0 09/19/2024    EOPERCENT 3.1 09/19/2024    BAPERCENT 0.8 09/19/2024    NE 2.55 09/19/2024    LYMABS 1.57 09/19/2024    MOABSO 0.59 09/19/2024    EOABSO 0.15 09/19/2024    BAABSO 0.04 09/19/2024     Lab Results   Component Value Date    MALBP <1.2 04/06/2022    CREUR 58.20 09/16/2024     Lab Results   Component Value Date    COLORUR Yellow 08/13/2024    CLARITY Clear 08/13/2024    SPECGRAVITY 1.015 08/13/2024    GLUUR Negative 08/13/2024    BILUR Negative 08/13/2024    KETUR Negative 08/13/2024    BLOODURINE Negative 08/13/2024    PHURINE 6.0 08/13/2024    PROUR Negative 08/13/2024    UROBILINOGEN 0.2 08/13/2024    NITRITE Negative 08/13/2024    LEUUR Negative  08/13/2024    NMIC Microscopic not indicated 08/13/2024    BACUR Few 04/06/2022       IMAGING:     Reviewed    ASSESSMENT:      # Severe hypotonic hyponatremia  -Likely hypovolemic related to poor PO intake  -On admission 118  -Overcorrected s/p IVF  -Now correcting appropriately     PLAN:      -Will allow sodium to improve naturally afterwards  -Encourage PO intake - high protein diet  -Okay to continue SSRI at this time   -Avoid nephrotoxins and renally dose medications for creatinine clearance  -Monitor intake and output daily  -Daily weights           Okay for discharge from nephrology perspective. Will follow-up outpatient.    Thank you for allowing us to participate in the care of this patient.       DO Laurence Acuna [facility] - Nephrology

## 2024-09-20 ENCOUNTER — TELEPHONE (OUTPATIENT)
Dept: HEMATOLOGY/ONCOLOGY | Age: 59
End: 2024-09-20

## 2024-09-20 NOTE — TELEPHONE ENCOUNTER
Ryann Ordonez MD  [facility]  Caller: Unspecified (Today,  1:17 PM)  May resume today, hold if diarrhea.Ryann Ordonez MD  [facility]  Caller: Unspecified (Today,  1:17 PM)  May resume today, hold if diarrhea. Needs labs, MD 7-10 days    Patient notified

## 2024-09-20 NOTE — PAYOR COMM NOTE
--------------RECONSIDERATION FOR INPATIENT SERVICES    CONTINUED STAY REVIEW    Payor: [employer]  Subscriber #:  [id number]  Authorization Number: [id number]    Admit date: 9/16/24  Admit time:  7:32 PM    Recent Labs   Lab 09/16/24  1335 09/16/24  1525 09/16/24 2007 09/17/24  0623   * 120* 125* 129*   K 4.3 4.2 4.3 4.1   CL 87* 88* 96* 97*   CO2 26.0 28.0 23.0 27.0   BUN 11 10 7* 8*   CREATSERUM 0.88 0.89 0.69 0.70   * 88 100* 97   CA 9.4 9.5 9.3 9.4   MG  --  2.2  --  2.1        9/18           Date of Service: 9/18/2024 12:26 PM     Signed         [facility] Nephrology  Inpatient Follow-up         MEDICATIONS:             Current Facility-Administered Medications   Medication Dose Route Frequency    dextrose 5% infusion   Intravenous Continuous    heparin (Porcine) 5000 UNIT/ML injection 5,000 Units  5,000 Units Subcutaneous Q8H JUAN ANTONIO    acetaminophen (Tylenol Extra Strength) tab 500 mg  500 mg Oral Q4H PRN    melatonin tab 3 mg  3 mg Oral Nightly PRN    polyethylene glycol (PEG 3350) (Miralax) 17 g oral packet 17 g  17 g Oral Daily PRN    sennosides (Senokot) tab 17.2 mg  17.2 mg Oral Nightly PRN    bisacodyl (Dulcolax) 10 MG rectal suppository 10 mg  10 mg Rectal Daily PRN    fleet enema (Fleet) rectal enema 133 mL  1 enema Rectal Once PRN    ondansetron (Zofran) 4 MG/2ML injection 4 mg  4 mg Intravenous Q6H PRN    prochlorperazine (Compazine) 10 MG/2ML injection 5 mg  5 mg Intravenous Q8H PRN    aspirin DR tab 81 mg  81 mg Oral Daily    atorvastatin (Lipitor) tab 80 mg  80 mg Oral Nightly    carvedilol (Coreg) tab 3.125 mg  3.125 mg Oral 2x Daily(Beta Blocker)    clonazePAM (KlonoPIN) tab 1 mg  1 mg Oral BID    zolpidem (Ambien) tab 10 mg  10 mg Oral Nightly         PHYSICAL EXAM:      Vital Signs: /73 (BP Location: Right arm)   Pulse 74   Temp 98.3 °F (36.8 °C) (Oral)   Resp 18   Ht 5' 3\" (1.6 m)   Wt 165 lb 4.8 oz (75 kg)   LMP  (LMP Unknown)   SpO2 100%   BMI  29.28 kg/m²   Temp (24hrs), Av.4 °F (36.9 °C), Min:98.1 °F (36.7 °C), Max:98.6 °F (37 °C)        Intake/Output Summary (Last 24 hours) at 2024 1226  Last data filed at 2024 1004      Gross per 24 hour   Intake 920 ml   Output 1400 ml   Net -480 ml          Wt Readings from Last 3 Encounters:   24 165 lb 4.8 oz (75 kg)   24 166 lb 8 oz (75.5 kg)   24 180 lb (81.6 kg)         General: no acute distress  HEENT: normocephalic, atraumatic  CV: RRR  Respiratory: no distress  Abdomen: soft, non-tender  Extremities: no edema bilaterally  Skin: warm, dry  Neuro: awake, alert     LABORATORY DATA:            Lab Results   Component Value Date     GLU 93 2024     BUN 7 (L) 2024     BUNCREA 12.0 2022     CREATSERUM 0.62 2024     ANIONGAP 7 2024     CA 9.5 2024     OSMOCALC 272 (L) 2024     ALKPHO 99 2024     AST 27 2024     ALT 38 2024     BILT 0.4 2024     TP 7.1 2024     ALB 3.6 2024     GLOBULIN 3.5 2024      (L) 2024     K 4.0 2024      2024     CO2 24.0 2024            Lab Results   Component Value Date     WBC 5.2 2024     RBC 4.79 2024     HGB 14.2 2024     HCT 41.5 2024     .0 2024     MCV 86.6 2024     MCH 29.6 2024     MCHC 34.2 2024     RDW 12.5 2024     NEPRELIM 2.95 2024     NEUTABS 20.15 (H) 2021     LYMPHABS 3.45 2021     EOSABS 0.86 (H) 2021     BASABS 1.15 (H) 2021     NEPERCENT 56.7 2024     LYPERCENT 27.3 2024     MOPERCENT 12.5 2024     EOPERCENT 2.5 2024     BAPERCENT 0.8 2024     NE 2.95 2024     LYMABS 1.42 2024     MOABSO 0.65 2024     EOABSO 0.13 2024     BAABSO 0.04 2024            Lab Results   Component Value Date     MALBP <1.2 2022     CREUR 58.20 2024            Lab Results   Component  Value Date     COLORUR Yellow 08/13/2024     CLARITY Clear 08/13/2024     SPECGRAVITY 1.015 08/13/2024     GLUUR Negative 08/13/2024     BILUR Negative 08/13/2024     KETUR Negative 08/13/2024     BLOODURINE Negative 08/13/2024     PHURINE 6.0 08/13/2024     PROUR Negative 08/13/2024     UROBILINOGEN 0.2 08/13/2024     NITRITE Negative 08/13/2024     LEUUR Negative 08/13/2024     NMIC Microscopic not indicated 08/13/2024     BACUR Few 04/06/2022         IMAGING:      Reviewed     ASSESSMENT:      # Severe hypotonic hyponatremia  -Likely hypovolemic related to poor PO intake  -On admission   -Overcorrected s/p IVF     PLAN:      -Plan for D5W and repeat BMP this afternoon  -Goal sodium should be about 128-130.  -Will allow sodium to improve naturally afterwards  -Encourage PO intake - high protein diet  -Okay to continue SSRI at this time   -Avoid nephrotoxins and renally dose medications for creatinine clearance  -Monitor intake and output daily  -Daily weights           We will continue to follow.     Thank you for allowing us to participate in the care of this patient.         DO Laurence Acuna [facility] - Nephrology                               MEDICATIONS ADMINISTERED IN LAST 1 DAY:  aspirin DR tab 81 mg       Date Action Dose Route User    Discharged on 9/19/2024 9/19/2024 0958 Given 81 mg Oral LaxDilma hart RN          clonazePAM (KlonoPIN) tab 1 mg       Date Action Dose Route User    Discharged on 9/19/2024 9/19/2024 0958 Given 1 mg Oral Dilma Bob RN          FLUoxetine (PROzac) tab 20 mg       Date Action Dose Route User    Discharged on 9/19/2024 9/19/2024 0958 Given 20 mg Oral LaxDilma hart RN          lisinopril (Prinivil; Zestril) tab 20 mg       Date Action Dose Route User    Discharged on 9/19/2024 9/19/2024 0958 Given 20 mg Oral Dilma Bob RN            Vitals (last day) before discharge       Date/Time Temp Pulse Resp BP SpO2 Weight O2 Device O2 Flow Rate  (L/min) [facility]    09/19/24 1157 98.3 °F (36.8 °C) 57 22 117/65 94 % -- None (Room air) -- YN    09/19/24 0842 98 °F (36.7 °C) 54 18 103/60 97 % -- None (Room air) -- YN    09/19/24 0526 97.8 °F (36.6 °C) 58 16 114/59 99 % -- None (Room air) -- SM    09/18/24 2300 97.5 °F (36.4 °C) 50 16 98/43 -- -- -- -- MM    09/18/24 2000 98.8 °F (37.1 °C) 52 16 121/47 100 % -- None (Room air) -- MM    09/18/24 1414 -- -- -- 127/72 -- -- -- -- AS    09/18/24 0500 98.3 °F (36.8 °C) 74 18 134/73 -- -- None (Room air) -- BN            Medication Administration Report  for Joshua Souza as of 09/10/24 through 09/19/24  Legend:       Medications 09/10 09/11 09/12 09/13 09/14 09/15 09/16 09/17 09/18 09/19   Completed Medications   sodium chloride 0.9 % IV bolus 1,000 mL  Dose: 1,000 mL  Freq: Once Route: IV  Start: 09/16/24 1620 End: 09/16/24 1810 [id number]     05907           Discontinued Medications   Medications 09/10 09/11 09/12 09/13 09/14 09/15 09/16 09/17 09/18 09/19                aspirin DR tab 81 mg  Dose: 81 mg  Freq: Daily Route: OR  Start: 09/16/24 2000 End: 09/19/24 1700   Admin Instructions:   Do not crush          (2000)3      20356      07434      24472        atorvastatin (Lipitor) tab 80 mg  Dose: 80 mg  Freq: Nightly Route: OR  Start: 09/16/24 2100 End: 09/19/24 1700 20177 20268 20309                      carvedilol (Coreg) tab 3.125 mg  Dose: 3.125 mg  Freq: 2 times daily(Beta Blocker) Route: OR  Start: 09/16/24 2015 End: 09/19/24 1700   Admin Instructions:   Hold for hr<60 or sbp<110          (2015)10      0600 [C]11     [id number]      956189     155759      987904        clonazePAM (KlonoPIN) tab 1 mg  Dose: 1 mg  Freq: 2 times daily Route: OR  Start: 09/16/24 2100 End: 09/19/24 1700   Admin Instructions:   CAUTION: REPRODUCTIVE RISK          [id number]      724153     431574      481541     347037      204835        dextrose 5% infusion  Rate: 125 mL/hr  Freq: Continuous Route: IV  Start:  09/18/24 1000 End: 09/18/24 1403            [id number]     799871                      dextrose 5% infusion  Rate: 100 mL/hr  Freq: Continuous Route: IV  Start: 09/17/24 1545 End: 09/17/24 2243   Admin Instructions:   6 hours only           [id number]     713700          dextrose 5% infusion  Rate: 125 mL/hr  Freq: Continuous Route: IV  Start: 09/17/24 1145 End: 09/17/24 1454   Admin Instructions:   Stop after 3H infusion then repeat lab           642928     049093          dextrose 5% infusion  Rate: 83 mL/hr  Freq: Continuous Route: IV  Start: 09/17/24 0945 End: 09/17/24 1137           133112     968363                       FLUoxetine (PROzac) tab 20 mg  Dose: 20 mg  Freq: Daily Route: OR  Start: 09/18/24 1400 End: 09/19/24 1700            [id number]      963914        heparin (Porcine) 5000 UNIT/ML injection 5,000 Units  Dose: 5,000 Units  Freq: Every 8 hours scheduled Route: SC  Start: 09/16/24 2200 End: 09/19/24 1700          [id number]      864954     036419     750740      487617     320139     175827      608022     40        lisinopril (Prinivil; Zestril) tab 20 mg  Dose: 20 mg  Freq: Daily Route: OR  Start: 09/18/24 1400 End: 09/19/24 1700            198925      364124                     ondansetron (Zofran) 4 MG/2ML injection 4 mg  Dose: 4 mg  Freq: Every 6 hours PRN Route: IV  PRN Reasons: Nausea,vomiting  Start: 09/16/24 1958 End: 09/19/24 1700   Admin Instructions:   Default antiemetic sequence (unless otherwise preferred by patient):  1. ondansetron (Zofran)  2. prochlorperazine (Compazine). Wait 15 minutes before proceeding to next medication in sequence.  Follow therapeutic duplication policy.           [id number]                                                 zolpidem (Ambien) tab 10 mg  Dose: 10 mg  Freq: Nightly Route: OR  Start: 09/16/24 2100 End: 09/19/24 1700          [id number]      436391      035562         Medications 09/10 09/11 09/12 09/13 09/14 09/15 09/16 09/17 09/18 09/19

## 2024-09-20 NOTE — PAYOR COMM NOTE
--------------  DISCHARGE REVIEW    Payor: [employer]  Subscriber #:  [id number]  Authorization Number: [id number]    Admit date: 9/16/24  Admit time:   7:32 PM  Discharge Date: 9/19/2024  3:00 PM     Admitting Physician: Jitendra Manzano DO  Attending Physician:  No att. providers found  Primary Care Physician: Vasquez Hilliard MD

## 2024-09-20 NOTE — TELEPHONE ENCOUNTER
Patient is calling. Released from ED hospital 9/19/24, sodium 1.32. She would like to know when she can resume chemo. Called 9/20/24. LYLY

## 2024-10-01 NOTE — PROGRESS NOTES
[facility] Progress Note      Patient Name:  Dyana Souza  YOB: 1965  Medical Record Number:  [id number]    Date of visit:  10/2/2024    CHIEF COMPLAINT: CML    HPI:     59 year old that I initially saw in 5/24.  CLL diag  in 2021 and followed at [facility].  Initial labs showed 62.49% BCR/ABL transcripts.  She started bosutinib.  Achieved a major molecular response in 9/22.  In 11/23, bosutinib was held for bradycardia which was then attributed to Coreg.  Also had some a flutter.  Went off bosutinib.  BCR ABL transcripts were positive in 1/24.  Bosutinib was restarted.  Held 8/24 when she was diagnosed with C. difficile and had diarrhea.    Seen earlier this month with profound hyponatremia, sent to ER and she was admitted.  Presents for evaluation. Feels better. Stool more formed.    SOCIAL HISTORY:    , homemaker, no children, has 1 dog.    ROS:     Constitutional: no fever, chills fatigue,night sweats or weight loss  Neurologic: no headache, seizures, diplopia or weakness  Respiratory: no cough, SOB or hemoptysis  Cardiovascular: no chest pain, ankle swelling, DE LA ROSA  GI: no nausea, vomiting, diarrhea or BRBPR  Musculoskeletal: no body-ache or joint pain  Dermatologic: no alopecia, rash, pruritis  : no hematuria, dysuria or frequency  Psych: no confusion or depression   Heme: no easy bruising or bleeding     ALLERGIES:    Allergies   Allergen Reactions    Amlodipine ANAPHYLAXIS    Clopidogrel WHEEZING and RASH    Metoclopramide HIVES and OTHER (SEE COMMENTS)     Cerner Allergy Text Annotation: Reglan, Cerner Reaction: hyper  Cerner Allergy Text Annotation: Reglan, Cerner Reaction: hyper      Mirtazapine Coughing, PALPITATIONS, Runny nose and SWELLING    Tramadol UNKNOWN    Aspirin ITCHING    Ciprofloxacin NAUSEA AND VOMITING and OTHER (SEE COMMENTS)     Cerner Allergy Text Annotation: ciprofloxacin, Cerner Reaction: vomit      Niacin And Related UNKNOWN     Other reaction(s): Rash     Opioid Analgesics OTHER (SEE COMMENTS), UNKNOWN and NAUSEA AND VOMITING     Per pt  Cerner Allergy Text Annotation: morphine, Cerner Reaction: chest pain      Sulfamethoxazole W/Trimethoprim NAUSEA AND VOMITING    Amoxicillin-Pot Clavulanate OTHER (SEE COMMENTS)     Upset her stomach with Augmentin.  Patient took amoxicillin without problem.    Gabapentin OTHER (SEE COMMENTS)    Sertraline UNKNOWN     Cerner Allergy Text Annotation: Zoloft      Trazodone UNKNOWN    Sulfa Antibiotics OTHER (SEE COMMENTS) and RASH     Cerner Allergy Text Annotation: sulfa drugs, Cerner Reaction: hives         MEDICATIONS:    Current Outpatient Medications   Medication Sig Dispense Refill    ondansetron (ZOFRAN) 8 MG tablet Take 1 tablet (8 mg total) by mouth every 8 (eight) hours as needed for Nausea. 30 tablet 3    LISINOPRIL 20 MG Oral Tab Take 1 tablet (20 mg total) by mouth 2 (two) times daily. 60 tablet 0    FLUoxetine HCl 20 MG Oral Tab Take 1 tablet (20 mg total) by mouth daily.      atorvastatin 80 MG Oral Tab Take 1 tablet (80 mg total) by mouth nightly.      carvedilol 3.125 MG Oral Tab Take 1 tablet (3.125 mg total) by mouth 2 (two) times daily with meals.      aspirin 81 MG Oral Tab EC Take 1 tablet (81 mg total) by mouth daily.      Famotidine (PEPCID AC OR) Take 25 mg by mouth in the morning and 25 mg before bedtime.      NON FORMULARY Medical cannabis as needed.      zolpidem 10 MG Oral Tab Take 1 tablet (10 mg total) by mouth daily. (Patient taking differently: Take 1 tablet (10 mg total) by mouth nightly.) 60 tablet 0    clonazePAM 1 MG Oral Tab Take 1 tablet (1 mg total) by mouth 2 (two) times daily. 60 tablet 1    Mometasone Furoate 0.1 % External Cream Apply 1 Application topically daily. 60 g 3    POTASSIUM CHLORIDE ER 20 MEQ Oral Tab CR TAKE 1 TABLET BY MOUTH DAILY 90 tablet 11    Bosutinib 100 MG Oral Tab Take 400 mg by mouth daily. (Patient not taking: Reported on 10/2/2024) 120 tablet 11     HYDROcodone-acetaminophen 5-325 MG Oral Tab Take 1-2 tablets by mouth 3 (three) times daily as needed. (Patient not taking: Reported on 10/2/2024) 30 tablet 0       VITALS:  Height: 160 cm (5' 2.99\") (10/02 1149)  Weight: 75.5 kg (166 lb 8 oz) (10/02 1149)  BSA (Calculated - sq m): 1.79 sq meters (10/02 1149)  Pulse: 67 (10/02 1149)  BP: 147/83 (10/02 1149)  Temp: 97.8 °F (36.6 °C) (10/02 1149)  Do Not Use - Resp Rate: --  SpO2: 96 % (10/02 1149)    PS:  ECO    PHYSICAL EXAM:    General: alert and oriented x 3, not in acute distress.  Accompanied by .  Neck: No adenopathy.  CVS: S1S2, regular  Rhythm, no murmurs.   Lungs: Clear to auscultation and percussion.  Abdomen: Soft, very mild tenderness.  Extremities:  No edema.  CNS: no focal deficit    Emotional well being: Patient's emotional well being was assessed.  No issues requiring acute psychosocial intervention were identified.     LABS:     Component 24 1417   BCR-ABL1 Source Whole Blood   BCR-ABL1 Interpretation DETECTED   Comment: DETECTED for the BCR-ABL1 p210 (e13/a2 and e14/a2) fusiontranscripts.  Results should be correlated with appropriateclinical and laboratory information as indicated.   % BCR-ABL1 0.17%   Comment: Estimated to represent % of total ABL1 (%BCR-ABL1:ABL1) based on the International Scale (IS).   Molecular Response Value 2.77       Recent Results (from the past 24 hour(s))   CBC W/DIFF [E]    Collection Time: 10/02/24 11:42 AM   Result Value Ref Range    WBC 3.9 (L) 4.0 - 11.0 x10(3) uL    RBC 4.67 3.80 - 5.30 x10(6)uL    HGB 14.0 12.0 - 16.0 g/dL    HCT 41.4 35.0 - 48.0 %    .0 150.0 - 450.0 10(3)uL    MCV 88.7 80.0 - 100.0 fL    MCH 30.0 26.0 - 34.0 pg    MCHC 33.8 31.0 - 37.0 g/dL    RDW 13.9 %    Neutrophil Absolute Prelim 2.64 1.50 - 7.70 x10 (3) uL    Neutrophil Absolute 2.64 1.50 - 7.70 x10(3) uL    Lymphocyte Absolute 0.87 (L) 1.00 - 4.00 x10(3) uL    Monocyte Absolute 0.27 0.10 - 1.00 x10(3) uL     Eosinophil Absolute 0.08 0.00 - 0.70 x10(3) uL    Basophil Absolute 0.03 0.00 - 0.20 x10(3) uL    Immature Granulocyte Absolute 0.01 0.00 - 1.00 x10(3) uL    Neutrophil % 67.6 %    Lymphocyte % 22.3 %    Monocyte % 6.9 %    Eosinophil % 2.1 %    Basophil % 0.8 %    Immature Granulocyte % 0.3 %   COMP METABOLIC PANEL [E]    Collection Time: 10/02/24 11:42 AM   Result Value Ref Range    Glucose 125 (H) 70 - 99 mg/dL    Sodium 135 (L) 136 - 145 mmol/L    Potassium 3.6 3.5 - 5.1 mmol/L    Chloride 104 98 - 112 mmol/L    CO2 29.0 21.0 - 32.0 mmol/L    Anion Gap 2 0 - 18 mmol/L    BUN 8 (L) 9 - 23 mg/dL    Creatinine 0.75 0.55 - 1.02 mg/dL    Calcium, Total 10.0 8.7 - 10.4 mg/dL    Calculated Osmolality 280 275 - 295 mOsm/kg    eGFR-Cr 92 >=60 mL/min/1.73m2    AST 35 (H) <34 U/L    ALT 39 10 - 49 U/L    Alkaline Phosphatase 68 46 - 118 U/L    Bilirubin, Total 0.5 0.3 - 1.2 mg/dL    Total Protein 6.8 5.7 - 8.2 g/dL    Albumin 4.2 3.2 - 4.8 g/dL    Globulin  2.6 2.0 - 3.5 g/dL    A/G Ratio 1.6 1.0 - 2.0    Patient Fasting for CMP? Patient not present          ASSESSMENT AND PLAN:     # CML:diag outside 6/21.  62.49 BCR/ABL transcripts in peripheral blood.  Started bosutinib, had major molecular response in 9/22.  Self discontinued bosutinib 11/23.  BCR/ABL transcripts +1/24.  Established with me 5/24.  Bosutinib restarted.  On hold since 8/24 for the diagnosis of C. difficile.  In 5/24, she continued to show response with 0.17% BCR/ABL transcripts.  Plan is to restart bosutinib when diarrhea better.      # Hyponatremia: improving.      ORDERS PLACED:        Procedures    CBC W/DIFF [E]    COMP METABOLIC PANEL [E]    CBC With Differential With Platelet    Comp Metabolic Panel (14)    BCR-ABL1, P210 Gene Rearrangement, Quantitative PCR Panel       Return in about 6 weeks (around 11/13/2024) for Labs, MD visit.    Janene Ordonez M.D.    [facility]    [address] [place],  [address]    10/2/2024

## 2024-10-02 ENCOUNTER — OFFICE VISIT (OUTPATIENT)
Dept: HEMATOLOGY/ONCOLOGY | Facility: HOSPITAL | Age: 59
End: 2024-10-02
Attending: INTERNAL MEDICINE
Payer: MEDICARE

## 2024-10-02 VITALS
WEIGHT: 166.5 LBS | HEART RATE: 67 BPM | SYSTOLIC BLOOD PRESSURE: 147 MMHG | OXYGEN SATURATION: 96 % | DIASTOLIC BLOOD PRESSURE: 83 MMHG | TEMPERATURE: 98 F | HEIGHT: 62.99 IN | BODY MASS INDEX: 29.5 KG/M2

## 2024-10-02 DIAGNOSIS — T50.905D MEDICATION ADVERSE EFFECT, SUBSEQUENT ENCOUNTER: ICD-10-CM

## 2024-10-02 DIAGNOSIS — R19.7 DIARRHEA, UNSPECIFIED TYPE: Primary | ICD-10-CM

## 2024-10-02 DIAGNOSIS — E87.1 HYPONATREMIA: ICD-10-CM

## 2024-10-02 DIAGNOSIS — T50.905A ADVERSE EFFECT OF DRUG, INITIAL ENCOUNTER: ICD-10-CM

## 2024-10-02 DIAGNOSIS — C92.10 CML (CHRONIC MYELOID LEUKEMIA) (HCC): ICD-10-CM

## 2024-10-02 LAB
ALBUMIN SERPL-MCNC: 4.2 G/DL (ref 3.2–4.8)
ALBUMIN/GLOB SERPL: 1.6 {RATIO} (ref 1–2)
ALP LIVER SERPL-CCNC: 68 U/L
ALT SERPL-CCNC: 39 U/L
ANION GAP SERPL CALC-SCNC: 2 MMOL/L (ref 0–18)
AST SERPL-CCNC: 35 U/L (ref ?–34)
BASOPHILS # BLD AUTO: 0.03 X10(3) UL (ref 0–0.2)
BASOPHILS NFR BLD AUTO: 0.8 %
BILIRUB SERPL-MCNC: 0.5 MG/DL (ref 0.3–1.2)
BUN BLD-MCNC: 8 MG/DL (ref 9–23)
CALCIUM BLD-MCNC: 10 MG/DL (ref 8.7–10.4)
CHLORIDE SERPL-SCNC: 104 MMOL/L (ref 98–112)
CO2 SERPL-SCNC: 29 MMOL/L (ref 21–32)
CREAT BLD-MCNC: 0.75 MG/DL
EGFRCR SERPLBLD CKD-EPI 2021: 92 ML/MIN/1.73M2 (ref 60–?)
EOSINOPHIL # BLD AUTO: 0.08 X10(3) UL (ref 0–0.7)
EOSINOPHIL NFR BLD AUTO: 2.1 %
ERYTHROCYTE [DISTWIDTH] IN BLOOD BY AUTOMATED COUNT: 13.9 %
GLOBULIN PLAS-MCNC: 2.6 G/DL (ref 2–3.5)
GLUCOSE BLD-MCNC: 125 MG/DL (ref 70–99)
HCT VFR BLD AUTO: 41.4 %
HGB BLD-MCNC: 14 G/DL
IMM GRANULOCYTES # BLD AUTO: 0.01 X10(3) UL (ref 0–1)
IMM GRANULOCYTES NFR BLD: 0.3 %
LYMPHOCYTES # BLD AUTO: 0.87 X10(3) UL (ref 1–4)
LYMPHOCYTES NFR BLD AUTO: 22.3 %
MCH RBC QN AUTO: 30 PG (ref 26–34)
MCHC RBC AUTO-ENTMCNC: 33.8 G/DL (ref 31–37)
MCV RBC AUTO: 88.7 FL
MONOCYTES # BLD AUTO: 0.27 X10(3) UL (ref 0.1–1)
MONOCYTES NFR BLD AUTO: 6.9 %
NEUTROPHILS # BLD AUTO: 2.64 X10 (3) UL (ref 1.5–7.7)
NEUTROPHILS # BLD AUTO: 2.64 X10(3) UL (ref 1.5–7.7)
NEUTROPHILS NFR BLD AUTO: 67.6 %
OSMOLALITY SERPL CALC.SUM OF ELEC: 280 MOSM/KG (ref 275–295)
PLATELET # BLD AUTO: 288 10(3)UL (ref 150–450)
POTASSIUM SERPL-SCNC: 3.6 MMOL/L (ref 3.5–5.1)
PROT SERPL-MCNC: 6.8 G/DL (ref 5.7–8.2)
RBC # BLD AUTO: 4.67 X10(6)UL
SODIUM SERPL-SCNC: 135 MMOL/L (ref 136–145)
WBC # BLD AUTO: 3.9 X10(3) UL (ref 4–11)

## 2024-10-02 PROCEDURE — 99215 OFFICE O/P EST HI 40 MIN: CPT | Performed by: INTERNAL MEDICINE

## 2024-10-02 PROCEDURE — G2211 COMPLEX E/M VISIT ADD ON: HCPCS | Performed by: INTERNAL MEDICINE

## 2024-10-02 NOTE — PROGRESS NOTES
Education Record    Learner:  Patient/ spouse    Disease / Diagnosis:CML      Barriers / Limitations:  None   Comments:    Method:  Discussion   Comments:    General Topics:  Plan of care reviewed   Comments:    Outcome:  Shows understanding   Comments:   Recent hospitalization for low sodium.  Has been holding medication due to diarrhea.   Feeling so much better, mentally better.   Stool still not normal, soft, mushy.   Saw her PCP, and advised to wait until labs are checked again to restart Bosutinib.   Pt eating increased protein, and pushing fluids as instructed.   Increasing activity as tolerated.

## 2024-11-08 NOTE — PROGRESS NOTES
[facility] Progress Note      Patient Name:  Dyana Souza  YOB: 1965  Medical Record Number:  [id number]    Date of visit:  2024    CHIEF COMPLAINT: CML    HPI:     59 year old that I initially saw in .  CLL diag  in  and followed at [facility].  Initial labs showed 62.49% BCR/ABL transcripts.  She started bosutinib.  Achieved a major molecular response in .  In , bosutinib was held for bradycardia which was then attributed to Coreg.  Also had some a flutter.  Went off bosutinib.  BCR ABL transcripts were positive in .  Bosutinib was restarted.  Held  when she was diagnosed with C. difficile and had diarrhea.  Admitted  for profound hyponatremia that was corrected.  Received medication 10/24 but held again a few days back when her pet .    SOCIAL HISTORY:    , homemaker, no children, has 1 dog.    ROS:     Constitutional: no fever, chills fatigue,night sweats or weight loss  Neurologic: no headache, seizures, diplopia or weakness  Respiratory: no cough, SOB or hemoptysis  Cardiovascular: no chest pain, ankle swelling, DE LA ROSA  GI: no nausea, vomiting, diarrhea or BRBPR  Musculoskeletal: no body-ache or joint pain  Dermatologic: no alopecia, rash, pruritis  : no hematuria, dysuria or frequency  Psych: no confusion or depression   Heme: no easy bruising or bleeding     ALLERGIES:    Allergies   Allergen Reactions    Amlodipine ANAPHYLAXIS    Clopidogrel WHEEZING and RASH    Metoclopramide HIVES and OTHER (SEE COMMENTS)     Cerner Allergy Text Annotation: Reglan, Cerner Reaction: hyper  Cerner Allergy Text Annotation: Reglan, Cerner Reaction: hyper      Mirtazapine Coughing, PALPITATIONS, Runny nose and SWELLING    Tramadol UNKNOWN    Aspirin ITCHING    Ciprofloxacin NAUSEA AND VOMITING and OTHER (SEE COMMENTS)     Cerner Allergy Text Annotation: ciprofloxacin, Cerner Reaction: vomit      Niacin And Related UNKNOWN     Other reaction(s): Rash    Opioid  Analgesics OTHER (SEE COMMENTS), UNKNOWN and NAUSEA AND VOMITING     Per pt  Cerner Allergy Text Annotation: morphine, Cerner Reaction: chest pain      Sulfamethoxazole W/Trimethoprim NAUSEA AND VOMITING    Amoxicillin-Pot Clavulanate OTHER (SEE COMMENTS)     Upset her stomach with Augmentin.  Patient took amoxicillin without problem.    Gabapentin OTHER (SEE COMMENTS)    Sertraline UNKNOWN     Cerner Allergy Text Annotation: Zoloft      Trazodone UNKNOWN    Sulfa Antibiotics OTHER (SEE COMMENTS) and RASH     Cerner Allergy Text Annotation: sulfa drugs, Cerner Reaction: hives         MEDICATIONS:    Current Outpatient Medications   Medication Sig Dispense Refill    ondansetron (ZOFRAN) 8 MG tablet Take 1 tablet (8 mg total) by mouth every 8 (eight) hours as needed for Nausea. 30 tablet 3    LISINOPRIL 20 MG Oral Tab Take 1 tablet (20 mg total) by mouth 2 (two) times daily. 60 tablet 0    FLUoxetine HCl 20 MG Oral Tab Take 1 tablet (20 mg total) by mouth daily.      atorvastatin 80 MG Oral Tab Take 1 tablet (80 mg total) by mouth nightly.      carvedilol 3.125 MG Oral Tab Take 1 tablet (3.125 mg total) by mouth 2 (two) times daily with meals.      aspirin 81 MG Oral Tab EC Take 1 tablet (81 mg total) by mouth daily.      Famotidine (PEPCID AC OR) Take 25 mg by mouth in the morning and 25 mg before bedtime.      NON FORMULARY Medical cannabis as needed.      zolpidem 10 MG Oral Tab Take 1 tablet (10 mg total) by mouth daily. (Patient taking differently: Take 1 tablet (10 mg total) by mouth nightly.) 60 tablet 0    HYDROcodone-acetaminophen 5-325 MG Oral Tab Take 1-2 tablets by mouth 3 (three) times daily as needed. 30 tablet 0    clonazePAM 1 MG Oral Tab Take 1 tablet (1 mg total) by mouth 2 (two) times daily. 60 tablet 1    Mometasone Furoate 0.1 % External Cream Apply 1 Application topically daily. 60 g 3    POTASSIUM CHLORIDE ER 20 MEQ Oral Tab CR TAKE 1 TABLET BY MOUTH DAILY 90 tablet 11    Bosutinib 100 MG Oral  Tab Take 400 mg by mouth daily. (Patient not taking: Reported on 2024) 120 tablet 11       VITALS:  Height: 160 cm (5' 2.99\") (1311)  Weight: 76.7 kg (169 lb) (1311)  BSA (Calculated - sq m): 1.8 sq meters (1311)  Pulse: 65 (1311)  BP: 174/110 (1311)  Temp: 98.3 °F (36.8 °C) (1311)  Do Not Use - Resp Rate: --  SpO2: 98 % (1311)    PS:  ECO    PHYSICAL EXAM:    General: alert and oriented x 3, not in acute distress.  Accompanied by .  Neck: No adenopathy.  CVS: S1S2, regular  Rhythm, no murmurs.   Lungs: Clear to auscultation and percussion.  Abdomen: Soft, very mild tenderness.  Extremities:  No edema.  CNS: no focal deficit    Emotional well being: Patient's emotional well being was assessed.  No issues requiring acute psychosocial intervention were identified.     LABS:     Component 24 1417   BCR-ABL1 Source Whole Blood   BCR-ABL1 Interpretation DETECTED   Comment: DETECTED for the BCR-ABL1 p210 (e13/a2 and e14/a2) fusiontranscripts.  Results should be correlated with appropriateclinical and laboratory information as indicated.   % BCR-ABL1 0.17%   Comment: Estimated to represent % of total ABL1 (%BCR-ABL1:ABL1) based on the International Scale (IS).   Molecular Response Value 2.77       Recent Results (from the past 24 hours)   Comp Metabolic Panel (14)    Collection Time: 24  1:00 PM   Result Value Ref Range    Glucose 130 (H) 70 - 99 mg/dL    Sodium 137 136 - 145 mmol/L    Potassium 3.7 3.5 - 5.1 mmol/L    Chloride 102 98 - 112 mmol/L    CO2 32.0 21.0 - 32.0 mmol/L    Anion Gap 3 0 - 18 mmol/L    BUN 11 9 - 23 mg/dL    Creatinine 0.72 0.55 - 1.02 mg/dL    Calcium, Total 9.0 8.5 - 10.1 mg/dL    Calculated Osmolality 285 275 - 295 mOsm/kg    eGFR-Cr 96 >=60 mL/min/1.73m2    AST 23 15 - 37 U/L    ALT 33 13 - 56 U/L    Alkaline Phosphatase 78 46 - 118 U/L    Bilirubin, Total 0.5 0.1 - 2.0 mg/dL    Total Protein 6.5 6.4 - 8.2 g/dL    Albumin 3.2  (L) 3.4 - 5.0 g/dL    Globulin  3.3 2.8 - 4.4 g/dL    A/G Ratio 1.0 1.0 - 2.0    Patient Fasting for CMP? No    CBC w/Auto Diff    Collection Time: 11/11/24  1:00 PM   Result Value Ref Range    WBC 4.8 4.0 - 11.0 x10(3) uL    RBC 4.83 3.80 - 5.30 x10(6)uL    HGB 14.8 12.0 - 16.0 g/dL    HCT 43.5 35.0 - 48.0 %    .0 150.0 - 450.0 10(3)uL    MCV 90.1 80.0 - 100.0 fL    MCH 30.6 26.0 - 34.0 pg    MCHC 34.0 31.0 - 37.0 g/dL    RDW 15.9 %    Neutrophil Absolute Prelim 3.02 1.50 - 7.70 x10 (3) uL    Neutrophil Absolute 3.02 1.50 - 7.70 x10(3) uL    Lymphocyte Absolute 1.08 1.00 - 4.00 x10(3) uL    Monocyte Absolute 0.48 0.10 - 1.00 x10(3) uL    Eosinophil Absolute 0.17 0.00 - 0.70 x10(3) uL    Basophil Absolute 0.02 0.00 - 0.20 x10(3) uL    Immature Granulocyte Absolute 0.01 0.00 - 1.00 x10(3) uL    Neutrophil % 63.2 %    Lymphocyte % 22.6 %    Monocyte % 10.0 %    Eosinophil % 3.6 %    Basophil % 0.4 %    Immature Granulocyte % 0.2 %         ASSESSMENT AND PLAN:     # CML: diag outside 6/21.  62.49 BCR/ABL transcripts in peripheral blood.  Started bosutinib, had major molecular response in 9/22.  Self discontinued bosutinib 11/23.  BCR/ABL transcripts +1/24.  Established with me 5/24.  In 5/24, she continued to show response with 0.17% BCR/ABL transcripts.  Bosutinib restarted held 8/24 for C. difficile, then started 10/24 and held again when she lost her support animal.  Encouraged patient to start and discussed importance of compliance.  She is finishing up dental work and will start.      # Hyponatremia: Resolved.    # Elevated BP: Discussed elevated BP with pt and recommended eval by PCP.  Per patient, BP is usually okay, she drank a high dose of caffeine today.    ORDERS PLACED:        Procedures    BCR-ABL1, P210 Gene Rearrangement, Quantitative PCR    CBC w/Auto Diff    CBC With Differential With Platelet    CBC With Differential With Platelet    Comp Metabolic Panel (14)    Ferritin    Iron And Tibc     BCR-ABL1, P210 Gene Rearrangement, Quantitative PCR Panel         Return in about 3 months (around 2/11/2025) for Labs, MD visit.    Janene Ordonez M.D.    [facility]    [address] [place], [address]    11/11/2024

## 2024-11-11 ENCOUNTER — OFFICE VISIT (OUTPATIENT)
Dept: HEMATOLOGY/ONCOLOGY | Age: 59
End: 2024-11-11
Attending: INTERNAL MEDICINE
Payer: MEDICARE

## 2024-11-11 VITALS
BODY MASS INDEX: 29.95 KG/M2 | RESPIRATION RATE: 18 BRPM | SYSTOLIC BLOOD PRESSURE: 174 MMHG | DIASTOLIC BLOOD PRESSURE: 110 MMHG | TEMPERATURE: 98 F | OXYGEN SATURATION: 98 % | HEIGHT: 62.99 IN | HEART RATE: 65 BPM | WEIGHT: 169 LBS

## 2024-11-11 DIAGNOSIS — D64.9 ANEMIA, UNSPECIFIED TYPE: Primary | ICD-10-CM

## 2024-11-11 DIAGNOSIS — E87.1 HYPONATREMIA: ICD-10-CM

## 2024-11-11 DIAGNOSIS — C92.10 CML (CHRONIC MYELOID LEUKEMIA) (HCC): ICD-10-CM

## 2024-11-11 DIAGNOSIS — T50.905D MEDICATION ADVERSE EFFECT, SUBSEQUENT ENCOUNTER: ICD-10-CM

## 2024-11-11 LAB
ALBUMIN SERPL-MCNC: 3.2 G/DL (ref 3.4–5)
ALBUMIN/GLOB SERPL: 1 {RATIO} (ref 1–2)
ALP LIVER SERPL-CCNC: 78 U/L
ALT SERPL-CCNC: 33 U/L
ANION GAP SERPL CALC-SCNC: 3 MMOL/L (ref 0–18)
AST SERPL-CCNC: 23 U/L (ref 15–37)
BASOPHILS # BLD AUTO: 0.02 X10(3) UL (ref 0–0.2)
BASOPHILS NFR BLD AUTO: 0.4 %
BILIRUB SERPL-MCNC: 0.5 MG/DL (ref 0.1–2)
BUN BLD-MCNC: 11 MG/DL (ref 9–23)
CALCIUM BLD-MCNC: 9 MG/DL (ref 8.5–10.1)
CHLORIDE SERPL-SCNC: 102 MMOL/L (ref 98–112)
CO2 SERPL-SCNC: 32 MMOL/L (ref 21–32)
CREAT BLD-MCNC: 0.72 MG/DL
EGFRCR SERPLBLD CKD-EPI 2021: 96 ML/MIN/1.73M2 (ref 60–?)
EOSINOPHIL # BLD AUTO: 0.17 X10(3) UL (ref 0–0.7)
EOSINOPHIL NFR BLD AUTO: 3.6 %
ERYTHROCYTE [DISTWIDTH] IN BLOOD BY AUTOMATED COUNT: 15.9 %
FASTING STATUS PATIENT QL REPORTED: NO
GLOBULIN PLAS-MCNC: 3.3 G/DL (ref 2.8–4.4)
GLUCOSE BLD-MCNC: 130 MG/DL (ref 70–99)
HCT VFR BLD AUTO: 43.5 %
HGB BLD-MCNC: 14.8 G/DL
IMM GRANULOCYTES # BLD AUTO: 0.01 X10(3) UL (ref 0–1)
IMM GRANULOCYTES NFR BLD: 0.2 %
LYMPHOCYTES # BLD AUTO: 1.08 X10(3) UL (ref 1–4)
LYMPHOCYTES NFR BLD AUTO: 22.6 %
MCH RBC QN AUTO: 30.6 PG (ref 26–34)
MCHC RBC AUTO-ENTMCNC: 34 G/DL (ref 31–37)
MCV RBC AUTO: 90.1 FL
MONOCYTES # BLD AUTO: 0.48 X10(3) UL (ref 0.1–1)
MONOCYTES NFR BLD AUTO: 10 %
NEUTROPHILS # BLD AUTO: 3.02 X10 (3) UL (ref 1.5–7.7)
NEUTROPHILS # BLD AUTO: 3.02 X10(3) UL (ref 1.5–7.7)
NEUTROPHILS NFR BLD AUTO: 63.2 %
OSMOLALITY SERPL CALC.SUM OF ELEC: 285 MOSM/KG (ref 275–295)
PLATELET # BLD AUTO: 250 10(3)UL (ref 150–450)
POTASSIUM SERPL-SCNC: 3.7 MMOL/L (ref 3.5–5.1)
PROT SERPL-MCNC: 6.5 G/DL (ref 6.4–8.2)
RBC # BLD AUTO: 4.83 X10(6)UL
SODIUM SERPL-SCNC: 137 MMOL/L (ref 136–145)
WBC # BLD AUTO: 4.8 X10(3) UL (ref 4–11)

## 2024-11-11 PROCEDURE — G2211 COMPLEX E/M VISIT ADD ON: HCPCS | Performed by: INTERNAL MEDICINE

## 2024-11-11 PROCEDURE — 99215 OFFICE O/P EST HI 40 MIN: CPT | Performed by: INTERNAL MEDICINE

## 2024-11-11 NOTE — PROGRESS NOTES
Education Record    Learner:  Patient/ spouse    Disease / Diagnosis:  CML  Bosutinib not currently taking  Barriers / Limitations:  None   Comments:    Method:  Discussion   Comments:    General Topics:  Plan of care reviewed   Comments:    Outcome:  Shows understanding   Comments:   Pt still grieving for recent loss of her compassion animal.   Has not been taking the Bosutinib.   Has been having dental work and has new smile.   Has also seen the eye MD and getting new glasses.   No diarrhea.   BP elevated today, she did take her medications but also had a large high caffeine coffee this am, that she knows she shouldn't have had.   Hair loss also due to stress she thinks  Emotional support offered.

## 2024-11-15 LAB
BCR-ABL1 INTERPRETATION: DETECTED
NS MOLECULAR RESPONSE VALUE: 3.62

## 2024-11-28 ENCOUNTER — APPOINTMENT (OUTPATIENT)
Dept: GENERAL RADIOLOGY | Facility: HOSPITAL | Age: 59
End: 2024-11-28
Attending: EMERGENCY MEDICINE
Payer: MEDICARE

## 2024-11-28 ENCOUNTER — APPOINTMENT (OUTPATIENT)
Dept: CT IMAGING | Facility: HOSPITAL | Age: 59
End: 2024-11-28
Attending: EMERGENCY MEDICINE
Payer: MEDICARE

## 2024-11-28 ENCOUNTER — APPOINTMENT (OUTPATIENT)
Dept: MRI IMAGING | Facility: HOSPITAL | Age: 59
End: 2024-11-28
Attending: EMERGENCY MEDICINE
Payer: MEDICARE

## 2024-11-28 ENCOUNTER — HOSPITAL ENCOUNTER (OUTPATIENT)
Facility: HOSPITAL | Age: 59
Setting detail: OBSERVATION
Discharge: HOME | End: 2024-12-01
Attending: EMERGENCY MEDICINE | Admitting: HOSPITALIST
Payer: MEDICARE

## 2024-11-28 DIAGNOSIS — R41.82 ALTERED MENTAL STATUS, UNSPECIFIED ALTERED MENTAL STATUS TYPE: ICD-10-CM

## 2024-11-28 DIAGNOSIS — R53.1 LEFT-SIDED WEAKNESS: Primary | ICD-10-CM

## 2024-11-28 PROBLEM — E87.6 HYPOKALEMIA: Status: ACTIVE | Noted: 2024-11-28

## 2024-11-28 LAB
ALBUMIN SERPL-MCNC: 4.6 G/DL (ref 3.2–4.8)
ALBUMIN/GLOB SERPL: 2 {RATIO} (ref 1–2)
ALP LIVER SERPL-CCNC: 87 U/L
ALT SERPL-CCNC: 25 U/L
ANION GAP SERPL CALC-SCNC: 9 MMOL/L (ref 0–18)
AST SERPL-CCNC: 30 U/L (ref ?–34)
BASOPHILS # BLD AUTO: 0.03 X10(3) UL (ref 0–0.2)
BASOPHILS NFR BLD AUTO: 0.4 %
BILIRUB SERPL-MCNC: 0.7 MG/DL (ref 0.3–1.2)
BUN BLD-MCNC: 10 MG/DL (ref 9–23)
CALCIUM BLD-MCNC: 9.5 MG/DL (ref 8.7–10.4)
CHLORIDE SERPL-SCNC: 103 MMOL/L (ref 98–112)
CO2 SERPL-SCNC: 28 MMOL/L (ref 21–32)
CREAT BLD-MCNC: 0.73 MG/DL
EGFRCR SERPLBLD CKD-EPI 2021: 95 ML/MIN/1.73M2 (ref 60–?)
EOSINOPHIL # BLD AUTO: 0.29 X10(3) UL (ref 0–0.7)
EOSINOPHIL NFR BLD AUTO: 4.2 %
ERYTHROCYTE [DISTWIDTH] IN BLOOD BY AUTOMATED COUNT: 16.3 %
GLOBULIN PLAS-MCNC: 2.3 G/DL (ref 2–3.5)
GLUCOSE BLD-MCNC: 93 MG/DL (ref 70–99)
HCT VFR BLD AUTO: 44.2 %
HGB BLD-MCNC: 14.9 G/DL
IMM GRANULOCYTES # BLD AUTO: 0.01 X10(3) UL (ref 0–1)
IMM GRANULOCYTES NFR BLD: 0.1 %
LYMPHOCYTES # BLD AUTO: 0.99 X10(3) UL (ref 1–4)
LYMPHOCYTES NFR BLD AUTO: 14.3 %
MCH RBC QN AUTO: 29.1 PG (ref 26–34)
MCHC RBC AUTO-ENTMCNC: 33.7 G/DL (ref 31–37)
MCV RBC AUTO: 86.3 FL
MONOCYTES # BLD AUTO: 0.75 X10(3) UL (ref 0.1–1)
MONOCYTES NFR BLD AUTO: 10.9 %
NEUTROPHILS # BLD AUTO: 4.84 X10 (3) UL (ref 1.5–7.7)
NEUTROPHILS # BLD AUTO: 4.84 X10(3) UL (ref 1.5–7.7)
NEUTROPHILS NFR BLD AUTO: 70.1 %
OSMOLALITY SERPL CALC.SUM OF ELEC: 289 MOSM/KG (ref 275–295)
PLATELET # BLD AUTO: 223 10(3)UL (ref 150–450)
POTASSIUM SERPL-SCNC: 3.5 MMOL/L (ref 3.5–5.1)
PROT SERPL-MCNC: 6.9 G/DL (ref 5.7–8.2)
RBC # BLD AUTO: 5.12 X10(6)UL
SODIUM SERPL-SCNC: 140 MMOL/L (ref 136–145)
TROPONIN I SERPL HS-MCNC: 9 NG/L
WBC # BLD AUTO: 6.9 X10(3) UL (ref 4–11)

## 2024-11-28 PROCEDURE — 70553 MRI BRAIN STEM W/O & W/DYE: CPT | Performed by: EMERGENCY MEDICINE

## 2024-11-28 PROCEDURE — 70496 CT ANGIOGRAPHY HEAD: CPT | Performed by: EMERGENCY MEDICINE

## 2024-11-28 PROCEDURE — 70498 CT ANGIOGRAPHY NECK: CPT | Performed by: EMERGENCY MEDICINE

## 2024-11-28 PROCEDURE — 71045 X-RAY EXAM CHEST 1 VIEW: CPT | Performed by: EMERGENCY MEDICINE

## 2024-11-28 RX ORDER — ONDANSETRON 2 MG/ML
4 INJECTION INTRAMUSCULAR; INTRAVENOUS ONCE
Status: COMPLETED | OUTPATIENT
Start: 2024-11-28 | End: 2024-11-28

## 2024-11-28 RX ORDER — POTASSIUM CHLORIDE 1500 MG/1
1 TABLET, EXTENDED RELEASE ORAL DAILY
Status: DISCONTINUED | OUTPATIENT
Start: 2024-11-29 | End: 2024-11-28

## 2024-11-28 RX ORDER — GADOTERATE MEGLUMINE 376.9 MG/ML
30 INJECTION INTRAVENOUS
Status: COMPLETED | OUTPATIENT
Start: 2024-11-28 | End: 2024-11-28

## 2024-11-28 RX ORDER — ONDANSETRON 2 MG/ML
4 INJECTION INTRAMUSCULAR; INTRAVENOUS EVERY 6 HOURS PRN
Status: DISCONTINUED | OUTPATIENT
Start: 2024-11-28 | End: 2024-12-01

## 2024-11-28 RX ORDER — LISINOPRIL 20 MG/1
20 TABLET ORAL 2 TIMES DAILY
Status: DISCONTINUED | OUTPATIENT
Start: 2024-11-28 | End: 2024-12-01

## 2024-11-28 RX ORDER — ENOXAPARIN SODIUM 100 MG/ML
40 INJECTION SUBCUTANEOUS DAILY
Status: DISCONTINUED | OUTPATIENT
Start: 2024-11-29 | End: 2024-12-01

## 2024-11-28 RX ORDER — FAMOTIDINE 20 MG/1
20 TABLET, FILM COATED ORAL 2 TIMES DAILY
COMMUNITY

## 2024-11-28 RX ORDER — HYDROCODONE BITARTRATE AND ACETAMINOPHEN 5; 325 MG/1; MG/1
1 TABLET ORAL EVERY 6 HOURS PRN
Status: DISCONTINUED | OUTPATIENT
Start: 2024-11-28 | End: 2024-12-01

## 2024-11-28 RX ORDER — CLONAZEPAM 0.5 MG/1
0.5 TABLET ORAL 2 TIMES DAILY PRN
Status: DISCONTINUED | OUTPATIENT
Start: 2024-11-28 | End: 2024-12-01

## 2024-11-28 RX ORDER — CARVEDILOL 3.12 MG/1
3.12 TABLET ORAL 2 TIMES DAILY WITH MEALS
Status: DISCONTINUED | OUTPATIENT
Start: 2024-11-28 | End: 2024-12-01

## 2024-11-28 RX ORDER — ATORVASTATIN CALCIUM 80 MG/1
80 TABLET, FILM COATED ORAL NIGHTLY
Status: DISCONTINUED | OUTPATIENT
Start: 2024-11-28 | End: 2024-12-01

## 2024-11-28 RX ORDER — ASPIRIN 81 MG/1
81 TABLET ORAL DAILY
Status: DISCONTINUED | OUTPATIENT
Start: 2024-11-29 | End: 2024-12-01

## 2024-11-28 RX ORDER — FLUOXETINE 10 MG/1
20 TABLET, FILM COATED ORAL DAILY
Status: DISCONTINUED | OUTPATIENT
Start: 2024-11-29 | End: 2024-12-01

## 2024-11-28 NOTE — ED PROVIDER NOTES
Patient Seen in: [facility] Emergency Department      History     Chief Complaint   Patient presents with    Altered Mental Status    Chest Pain     Stated Complaint: ams, low na    Subjective:   HPI      This is a 59-year-old woman history of CLL currently receiving treatment, here for evaluation of generally not feeling well over the past week or so does not feel like herself, reports intermittent dizziness, feeling off balance, positional in nature, states she had numbness on the left side of her body over the past few days, perhaps 3 or 4 days, yesterday started to feel like she had increasing weakness on the left side of her body, then today felt like she had some weakness and numbness on the left side of her face as well decided to come to the ER to get checked out denies any recent chest pain shortness of breath vomiting diarrhea any other complaints is concerned because she had similar symptoms of generalized weakness not feeling like herself few months ago, was found to be hyponatremic.    Objective:     Past Medical History:    Achalasia    Anxiety    Back pain    C. difficile colitis    Chronic fatigue syndrome    Completed stroke ([facility])    Coronary atherosclerosis of native coronary artery    Overview:  Cath by DANIEL Gregg 06/13/2007 for R/S CP with radiation to her back and jaw.  RCA stent (done in [place]) . Tubular LAD/Diag stent of 80% was treated with kissing balloon. LVEF 50%.   CABG 07/30/2007 had CABG x4 by Dr JYALYN Maher. KIMBLE-LAD, SVG-OM, SVG-PDA and SVG-Diag. Admitted with atypical CP Jul 2015, Trop (-), ECHO LVEF  55% w/o WMA, MPI done Jan 2015 negative for ischemia.       Degenerative joint disease (DJD) of lumbar spine    Diabetes mellitus (HCC)    Diverticulitis    Edema    Essential hypertension    Fibromyalgia    GERD (gastroesophageal reflux disease)    Insomnia    Leukemia (HCC)    Muscle spasm    Obesity    Obstructive sleep apnea    Old myocardial infarction    Snoring              Past  Surgical History:   Procedure Laterality Date    Appendectomy      Cabg, arterial, four+          Cholecystectomy      Colonoscopy      Hysterectomy      partial, not due to cancer    Oophorectomy Right     noncancer,                Social History     Socioeconomic History    Marital status:    Tobacco Use    Smoking status: Former     Current packs/day: 0.00     Types: Cigarettes     Quit date:      Years since quittin.9    Smokeless tobacco: Never   Vaping Use    Vaping status: Never Used   Substance and Sexual Activity    Alcohol use: No     Comment: occ    Drug use: Yes     Types: Cannabis     Social Drivers of Health     Food Insecurity: No Food Insecurity (2024)    Food Insecurity     Food Insecurity: Never true   Transportation Needs: No Transportation Needs (2024)    Transportation Needs     Lack of Transportation: No    Received from [facility]    Social Connections   Housing Stability: Low Risk  (2024)    Housing Stability     Housing Instability: No                  Physical Exam     ED Triage Vitals   BP 24 1434 (!) 173/98   Pulse 24 1434 69   Resp 24 1434 16   Temp 24 1434 97.1 °F (36.2 °C)   Temp src 24 1434 Temporal   SpO2 24 1434 93 %   O2 Device 24 1523 None (Room air)       Current Vitals:   Vital Signs  BP: (!) 174/77 (Notified RN --> Rachael [facility])  Pulse: 59  Resp: 18  Temp: 98.1 °F (36.7 °C)  Temp src: Oral  MAP (mmHg): (!) 105 (Notified RN --> Rachael [facility])    Oxygen Therapy  SpO2: 92 %  O2 Device: None (Room air)  Pulse Ox Probe Location: Left hand        Physical Exam      Physical Exam  Vitals signs and nursing note reviewed.   General:  Patient laying supine in the bed in no acute distress  Head: Normocephalic and atraumatic.   HEENT:  Mucous membranes are moist.   Cardiovascular:  Normal rate and regular rhythm.  No Edema  Pulmonary:  Pulmonary effort is normal.  Normal breath sounds. No  wheezing, rhonchi or rales.   Abdominal: Soft nontender nondistended, normal bowel sounds, no guarding no rebound tenderness  Skin: Warm and dry  Neurological: Awake alert, speech is normal, motor 4-5 in left upper extremity left lower extremity, 5 out of 5 in right upper extremity right lower extremity.   sensation is grossly intact throughout.  No facial asymmetry.         ED Course     Labs Reviewed   CBC WITH DIFFERENTIAL WITH PLATELET - Abnormal; Notable for the following components:       Result Value    Lymphocyte Absolute 0.99 (*)     All other components within normal limits   COMP METABOLIC PANEL (14) - Normal   TROPONIN I HIGH SENSITIVITY - Normal   RAINBOW DRAW BLUE     EKG    Rate, intervals and axes as noted on EKG Report.  Rate: 58  Rhythm: Sinus Rhythm  Reading: No acute ischemic changes                MRI BRAIN (W+WO) (CPT=70553)    Result Date: 11/28/2024  PROCEDURE:  MRI BRAIN (W+WO) (CPT=70553)  COMPARISON:  None.  INDICATIONS:  Left sided weakness, h/o CLL  TECHNIQUE:  MRI of the brain was performed with multi-planar T1, T2-weighted images with FLAIR sequences and diffusion weighted images without and with infusion.  PATIENT STATED HISTORY:(As transcribed by Technologist)  Dizziness, left sided weakness, pain to back of head.  History of 2 prior strokes and leukemia.   CONTRAST USED:  22 mL of Dotarem  FINDINGS:  INTRACRANIAL:  There are small foci of FLAIR hyperintensity right basal ganglia and in the devonte.  This most likely indicates chronic small vessel ischemic change.  Bilateral T2 hyperintensity in the periaqueductal region is noted for example series 9, image 11. This appearance is not typical for small vessel ischemic change and can be associated with thiamine deficiency (Wernicke encephalopathy).  Correlation with any clinical findings is necessary. VENTRICLES/SULCI:   Ventricles and sulci are normal in caliber.  There are no extra-axial fluid collections.  There is no midline shift.  SINUSES/ORBITS:       The visualized paranasal sinuses are clear.  The orbits are unremarkable. MASTOIDS:       The mastoids are clear.            CONCLUSION:  1. There is no evidence of acute ischemia, hemorrhage or mass. 2. Atypical T2 hyperintensity noted symmetrically and bilaterally and periaqueductal region on image described above can be associated with thiamine deficiency or other moderate polyp abnormalities.  Correlation with any known clinical history is necessary. 3. There is chronic small vessel ischemic change noted.    LOCATION:  [facility]    Dictated by (CST): Vasquez Crane MD on 11/28/2024 at 5:34 PM     Finalized by (CST): Vasquez Crane MD on 11/28/2024 at 5:41 PM       CTA BRAIN + CTA CAROTIDS (CPT=70496/90613)    Result Date: 11/28/2024  PROCEDURE:  CTA BRAIN + CTA CAROTIDS (CPT=70496/69839)  COMPARISON:  None.  INDICATIONS:  Left sided weakness  TECHNIQUE:  CT angiography of the head and neck were obtained with non-ionic contrast.  3D volume rendering images were obtained by the technologist as well as the radiologist on an independent workstation.  Multiplanar 3D reformatted imaging including multiplanar MIP images were obtained.  Curved planar reformats were performed through the carotid and vertebral arteries. All measurements obtained in this exam were performed using NASCET criteria.  Dose reduction techniques were used. Dose information is transmitted to the ACR (American College of Radiology) NRDR (National Radiology Data Registry) which includes the Dose Index Registry.  PATIENT STATED HISTORY:(As transcribed by Technologist)  left sided weakness   CONTRAST USED:  75 mLcc of Isovue 370  FINDINGS:  VASCULATURE:  There is atherosclerotic disease in the intracranial cavernous carotid arteries bilaterally without evidence of significant stenosis. VENTRICLES:  Normal for age.  No enlargement or displacement. CEREBRUM:  Areas of decreased attenuation right basal ganglia are most likely result of  chronic small vessel ischemic change. CEREBELLUM:  Normal for age.  No excessive atrophy, mass, or hemorrhage, or abnormal enhancement. BRAINSTEM:  Normal for age.  No excessive atrophy, mass, or hemorrhage, or abnormal enhancement. BASAL CISTERNS:  No subarachnoid hemorrhage or effacement.  SKULL:  Negative.   LEFT INTERNAL CAROTID:  Densely calcified plaque proximal internal carotid artery causes 40% stenosis. EXTERNAL CAROTID:  No hemodynamically significant stenosis or dissection COMMON CAROTID:  Mild calcified plaque at vessel origin does not cause significant stenosis. VERTEBRAL:  No hemodynamically significant stenosis or dissection  RIGHT INTERNAL CAROTID:   Calcified plaque proximal internal carotid artery causes approximately 40% stenosis. EXTERNAL CAROTID:    No hemodynamically significant stenosis or dissection COMMON CAROTID:   No hemodynamically significant stenosis or dissection VERTEBRAL:   No hemodynamically significant stenosis or dissection  OTHER:  The visualized soft tissues of the neck are also unremarkable.              CONCLUSION:  1. Intracranial CT angiogram demonstrates no large branch occlusion or aneurysm. 2. CT angiogram of neck demonstrates calcified plaque in proximal internal carotid arteries bilaterally that causes approximately 40% stenosis bilaterally. 3. Focal decreased attenuation right basal ganglia is most likely the result of chronic small vessel ischemic change.  If there is clinical evidence of acute ischemia then further evaluation with MRI is recommended.   LOCATION:  [facility]   Dictated by (CST): Vasquez Crane MD on 11/28/2024 at 4:31 PM     Finalized by (CST): Vasquez Crane MD on 11/28/2024 at 4:36 PM       XR CHEST AP PORTABLE  (CPT=71045)    Result Date: 11/28/2024  PROCEDURE:  XR CHEST AP PORTABLE  (CPT=71045)  TECHNIQUE:  AP chest radiograph was obtained.  COMPARISON:  None.  INDICATIONS:  ams, low na  PATIENT STATED HISTORY: (As transcribed by Technologist)  Pt to ER  with complaints of not feeling good. Hx of leukemia. Reports cannot think right, tingling on left side for 34 x days. Nausea, abd bloating. Htn. Multiple complaints.    FINDINGS:  Calcified nodule right lower lung is noted.  Remainder of lungs is clear.  Heart size is within normal limits.  Mediastinum and gab are unremarkable.  There are sternal wires at the midline.  Chest wall structures are otherwise unremarkable.            CONCLUSION:  There is no evidence of active cardiopulmonary disease.   LOCATION:  [facility]      Dictated by (CST): Vasquez Crane MD on 11/28/2024 at 3:34 PM     Finalized by (CST): Vasquez Crane MD on 11/28/2024 at 3:34 PM             [facility]      This is a 59-year-old woman history of CLL, previous stroke, here for evaluation of generally not feeling well not feeling like herself, most reports weakness in the left side of her body, this was preceded by numbness on the left side of her body.  Differential includes acute CVA electrolyte abnormality sodium is normal CTA head and neck show no acute abnormalities, MRI brain performed with which shows no acute ischemic changes.  Discussed case with general neurology, recommends MRI spine, discussed the case with oncology as well, states spine lesions could develop if this did convert to lymphoma.  Patient will be admitted for further monitoring evaluation Case endorsed to [facility] hospitalist agrees with plan for admission, MRI spine with and without ordered.    tPA considered but not given as symptoms have been ongoing and progressive over the past 3 to 4 days.    Reviewed internal medicine discharge summary from 9/16/2024  Patient is a 59 year old female with PMH sig for CLL on bosutinib, recent C. difficile, anxiety, hypertension, diabetes, CAD who was sent from her oncologist office for abnormal lab work.  Patient managed for hyponatremia.  Patient was seen and evaluated by nephrology, her hyponatremia continued to improve.  Patient should continue to  follow-up with her oncologist for continuation of her CLL treatment.   Cleared for discharge with outpatient follow-up.     Hyponatremia  - Likely hypovolemic in the setting of poor oral intake and chemotherapy/diarrhea  - Sodium 120 on admission, now at 132  - Urine lites   - Okay to continue SSRIs per renal  - Nephrology on consult, case discussed     CLL  - Previously on bosutinib, stopped in August given C. difficile  - Per nephrology notes, plan was to resume it if C. difficile is cleared  - Will defer this to outpatient at this time     Generalized anxiety disorder  - Clonazepam per home regimen  -Resume fluoxetine     Hypertension  - Holding lisinopril, resume Coreg     Remote history of A-fib  - Continue Coreg    I independently viewed and interpreted the following imaging: CT head with no acute intracranial hemorrhage        Admission disposition: 11/28/2024  6:38 PM           Medical Decision Making      Disposition and Plan     Clinical Impression:  1. Left-sided weakness    2. Altered mental status, unspecified altered mental status type         Disposition:  Admit  11/28/2024  6:38 pm    Follow-up:  No follow-up provider specified.        Medications Prescribed:  Current Discharge Medication List              Supplementary Documentation:         Hospital Problems       Present on Admission  Date Reviewed: 11/11/2024            ICD-10-CM Noted POA    * (Principal) Left-sided weakness R53.1 11/28/2024 Unknown    Altered mental status, unspecified altered mental status type R41.82 11/28/2024 Unknown    Hypokalemia E87.6 11/28/2024 Yes

## 2024-11-28 NOTE — ED INITIAL ASSESSMENT (HPI)
Pt to ER with complaints of not feeling good. Hx of leukemia. Reports cannot think right, tingling on left side for 34 x days. Nausea, abd bloating. Htn. Multiple complaints.

## 2024-11-28 NOTE — ED QUICK NOTES
PT IS A&OX3. SPEAKING CLEARLY WITH NURSE. RESPIRATIONS EQUAL AND NONLABORED. C/O WEAKNESS, NUMBNESS/TINGLING TO LEFT SIDE OF BODY STARTING WEDNESDAY. PT C/O HA GETTING WORSE.

## 2024-11-29 ENCOUNTER — APPOINTMENT (OUTPATIENT)
Dept: MRI IMAGING | Facility: HOSPITAL | Age: 59
End: 2024-11-29
Attending: INTERNAL MEDICINE
Payer: MEDICARE

## 2024-11-29 ENCOUNTER — APPOINTMENT (OUTPATIENT)
Dept: CV DIAGNOSTICS | Facility: HOSPITAL | Age: 59
End: 2024-11-29
Attending: HOSPITALIST
Payer: MEDICARE

## 2024-11-29 PROBLEM — R93.0 ABNORMAL MRI OF HEAD: Status: ACTIVE | Noted: 2024-11-29

## 2024-11-29 LAB
ANION GAP SERPL CALC-SCNC: 7 MMOL/L (ref 0–18)
ATRIAL RATE: 58 BPM
BASOPHILS # BLD AUTO: 0.03 X10(3) UL (ref 0–0.2)
BASOPHILS NFR BLD AUTO: 0.6 %
BUN BLD-MCNC: 10 MG/DL (ref 9–23)
CALCIUM BLD-MCNC: 9.5 MG/DL (ref 8.7–10.4)
CHLORIDE SERPL-SCNC: 106 MMOL/L (ref 98–112)
CO2 SERPL-SCNC: 30 MMOL/L (ref 21–32)
CREAT BLD-MCNC: 0.69 MG/DL
EGFRCR SERPLBLD CKD-EPI 2021: 100 ML/MIN/1.73M2 (ref 60–?)
EOSINOPHIL # BLD AUTO: 0.21 X10(3) UL (ref 0–0.7)
EOSINOPHIL NFR BLD AUTO: 4.2 %
ERYTHROCYTE [DISTWIDTH] IN BLOOD BY AUTOMATED COUNT: 16.2 %
GLUCOSE BLD-MCNC: 72 MG/DL (ref 70–99)
GLUCOSE BLD-MCNC: 89 MG/DL (ref 70–99)
GLUCOSE BLD-MCNC: 95 MG/DL (ref 70–99)
HCT VFR BLD AUTO: 45.3 %
HGB BLD-MCNC: 15.2 G/DL
IMM GRANULOCYTES # BLD AUTO: 0.01 X10(3) UL (ref 0–1)
IMM GRANULOCYTES NFR BLD: 0.2 %
LYMPHOCYTES # BLD AUTO: 1.13 X10(3) UL (ref 1–4)
LYMPHOCYTES NFR BLD AUTO: 22.6 %
MCH RBC QN AUTO: 29.3 PG (ref 26–34)
MCHC RBC AUTO-ENTMCNC: 33.6 G/DL (ref 31–37)
MCV RBC AUTO: 87.5 FL
MONOCYTES # BLD AUTO: 0.68 X10(3) UL (ref 0.1–1)
MONOCYTES NFR BLD AUTO: 13.6 %
NEUTROPHILS # BLD AUTO: 2.93 X10 (3) UL (ref 1.5–7.7)
NEUTROPHILS # BLD AUTO: 2.93 X10(3) UL (ref 1.5–7.7)
NEUTROPHILS NFR BLD AUTO: 58.8 %
OSMOLALITY SERPL CALC.SUM OF ELEC: 295 MOSM/KG (ref 275–295)
P AXIS: 38 DEGREES
P-R INTERVAL: 156 MS
PLATELET # BLD AUTO: 210 10(3)UL (ref 150–450)
POTASSIUM SERPL-SCNC: 3.6 MMOL/L (ref 3.5–5.1)
Q-T INTERVAL: 428 MS
QRS DURATION: 92 MS
QTC CALCULATION (BEZET): 420 MS
R AXIS: 47 DEGREES
RBC # BLD AUTO: 5.18 X10(6)UL
SODIUM SERPL-SCNC: 143 MMOL/L (ref 136–145)
T AXIS: 56 DEGREES
TROPONIN I SERPL HS-MCNC: 10 NG/L
TSI SER-ACNC: 3.71 UIU/ML (ref 0.55–4.78)
VENTRICULAR RATE: 58 BPM
VIT B12 SERPL-MCNC: 941 PG/ML (ref 211–911)
WBC # BLD AUTO: 5 X10(3) UL (ref 4–11)

## 2024-11-29 PROCEDURE — 72157 MRI CHEST SPINE W/O & W/DYE: CPT | Performed by: INTERNAL MEDICINE

## 2024-11-29 PROCEDURE — 72158 MRI LUMBAR SPINE W/O & W/DYE: CPT | Performed by: INTERNAL MEDICINE

## 2024-11-29 PROCEDURE — 72156 MRI NECK SPINE W/O & W/DYE: CPT | Performed by: INTERNAL MEDICINE

## 2024-11-29 PROCEDURE — 93306 TTE W/DOPPLER COMPLETE: CPT | Performed by: HOSPITALIST

## 2024-11-29 PROCEDURE — 99223 1ST HOSP IP/OBS HIGH 75: CPT | Performed by: OTHER

## 2024-11-29 RX ORDER — CALCIUM CARBONATE 500 MG/1
500 TABLET, CHEWABLE ORAL EVERY 8 HOURS PRN
Status: DISCONTINUED | OUTPATIENT
Start: 2024-11-29 | End: 2024-12-01

## 2024-11-29 RX ORDER — GADOTERATE MEGLUMINE 376.9 MG/ML
15 INJECTION INTRAVENOUS
Status: COMPLETED | OUTPATIENT
Start: 2024-11-29 | End: 2024-11-29

## 2024-11-29 RX ORDER — CALCIUM CARBONATE 500 MG/1
1000 TABLET, CHEWABLE ORAL EVERY 8 HOURS PRN
Status: DISCONTINUED | OUTPATIENT
Start: 2024-11-29 | End: 2024-12-01

## 2024-11-29 RX ORDER — FAMOTIDINE 20 MG/1
20 TABLET, FILM COATED ORAL 2 TIMES DAILY
Status: DISCONTINUED | OUTPATIENT
Start: 2024-11-29 | End: 2024-12-01

## 2024-11-29 NOTE — CONSULTS
Hematology/Oncology Initial Consultation Note    Patient Name: Dyana Souza  Medical Record Number: [id number]    YOB: 1965   Date of Consultation: 11/30/2024   Physician requesting consultation: Johnathon Fletcher DO     Reason for Consultation:  Dyana Souza was seen today for the diagnosis of CML    Oncologic History:  *CML  -2021- diagnosed, initially followed [facility]  -Started on bosutinib, achieved MMR by 9/2022  -11/2023-held to see if due to bradycardia, later attributed to Coreg  -[id number]-[id number] transcript increased bosutinib restarted  -8/2024-bosutinib held with C. difficile diarrhea, hyponatremia  -10/2024-resumed bosutinib, but held again due to death of support animal  -11/12/24- resumed bosutinib at 400mg daily  -11/25/24- reduced bosutinib to 300 mg daily due to possible side effects  -11/27/24- began to hold bosutinib due to hospital admission     % BCR-ABL1   6/9/2021 62.49%   8/23/2021 7.688%   10/22/2021 0.1820%   1/18/2022 0.2233%   4/22/2022 <0.0032%   9/1/2022 <0.0032%   1/20/2023 <0.0032%   10/19/2023 0.2002%   1/25/2024 0.1575%   5/20/2024 0.17%    11/11/2024 0.024%      =============================  History of Present Illness: 59-year-old female with history of CAD s/p CABG, prior CVA, and CML followed by Dr. Ordonez on bosutinib with clinical course summarized as above presented with left-sided weakness and numbness.    She reports that ever since she started on bosutinib a few years ago she has had various medical issues on and off.  She reports starting on Monday developing some headaches and neck pains with associated numbness and mild weakness on her left side.  She then reduced the dose of her bosutinib after discussion with Dr. Ordonez.  She continued to have issues with weakness and therefore she presented to the ED for further evaluation.    She did have a prior CVA with left-sided weakness in the past that had previously resolved.  She takes aspirin 81 mg  daily at baseline.    At the time of my visit this morning she feels her strength has improved to some extent.  MRI imaging has been largely unrevealing as below.    Past Medical History:  Past Medical History:    Achalasia    Anxiety    Back pain    C. difficile colitis    Chronic fatigue syndrome    Completed stroke (HCC)    Coronary atherosclerosis of native coronary artery    Overview:  Cath by DANIEL Gregg 06/13/2007 for R/S CP with radiation to her back and jaw.  RCA stent (done in [place]) . Tubular LAD/Diag stent of 80% was treated with kissing balloon. LVEF 50%.   CABG 07/30/2007 had CABG x4 by Dr JAYLYN Maher. KIMBLE-LAD, SVG-OM, SVG-PDA and SVG-Diag. Admitted with atypical CP Jul 2015, Trop (-), ECHO LVEF  55% w/o WMA, MPI done Jan 2015 negative for ischemia.       Degenerative joint disease (DJD) of lumbar spine    Diabetes mellitus (HCC)    Diverticulitis    Edema    Essential hypertension    Fibromyalgia    GERD (gastroesophageal reflux disease)    Insomnia    Leukemia (HCC)    Muscle spasm    Obesity    Obstructive sleep apnea    Old myocardial infarction    Snoring     Past Surgical History:   Procedure Laterality Date    Appendectomy      Cabg, arterial, four+      2007    Cholecystectomy      Colonoscopy      Hysterectomy      partial, not due to cancer    Oophorectomy Right     noncancer,     Home Medications:  Medications Ordered Prior to Encounter[1]  Current Inpatient Medications:  Inpatient Meds:   famotidine  20 mg Oral BID    aspirin  81 mg Oral Daily    atorvastatin  80 mg Oral Nightly    carvedilol  3.125 mg Oral BID with meals    FLUoxetine HCl  20 mg Oral Daily    lisinopril  20 mg Oral BID    enoxaparin  40 mg Subcutaneous Daily     PRN Meds:    calcium carbonate    calcium carbonate    clonazePAM    HYDROcodone-acetaminophen    melatonin    ondansetron    Allergies:   Allergies[2]    Psychosocial History:  Social History     Social History Narrative    Not on file     Social History     Socioeconomic  History    Marital status:    Tobacco Use    Smoking status: Former     Current packs/day: 0.00     Types: Cigarettes     Quit date: 2006     Years since quittin.9    Smokeless tobacco: Never   Vaping Use    Vaping status: Never Used   Substance and Sexual Activity    Alcohol use: No     Comment: occ    Drug use: Yes     Types: Cannabis     Social Drivers of Health     Food Insecurity: No Food Insecurity (2024)    Food Insecurity     Food Insecurity: Never true   Transportation Needs: No Transportation Needs (2024)    Transportation Needs     Lack of Transportation: No    [facility]    Social Connections   Housing Stability: Low Risk  (2024)    Housing Stability     Housing Instability: No     Family Medical History:  Family History   Problem Relation Age of Onset    Cancer Paternal Grandfather         pancreatic ca    Other (malignant neoplasm of pancrease) Paternal Grandfather     Stroke Brother     Other (lupus anticoagulant) Brother      Review of Systems:  A 10-point ROS was done with pertinent positives and negative per the HPI    Vital Signs:  Height: 160 cm (5' 3\") ( 143)  Weight: 74.6 kg (164 lb 8 oz) (2134)  BSA (Calculated - sq m): 1.79 sq meters (1434)  Pulse: 66 ( 08)  BP: 162/93 (800)  Temp: 98.3 °F (36.8 °C) (800)  Do Not Use - Resp Rate: --  SpO2: 93 % (800)    Wt Readings from Last 6 Encounters:   24 74.6 kg (164 lb 8 oz)   24 76.7 kg (169 lb)   10/02/24 75.5 kg (166 lb 8 oz)   24 75 kg (165 lb 4.8 oz)   24 75.5 kg (166 lb 8 oz)   24 81.6 kg (180 lb)     Physical Examination:  General: Patient is alert and oriented, not in acute distress  Psych: Mood and affect are appropriate  Eyes: EOMI  ENT: Oropharynx is clear  CV: Regular rate and rhythm, no murmurs, no LE edema  Respiratory: Lungs clear to auscultation bilaterally  GI/Abd: Soft, non-tender with normoactive  bowel sounds, no hepatosplenomegaly  Neurological: Grossly intact   Lymphatics: No palpable lymphadenopathy  Skin: no rashes or petechiae    Laboratory:  Recent Labs   Lab 11/28/24  1504 11/29/24  0627   WBC 6.9 5.0   HGB 14.9 15.2   HCT 44.2 45.3   .0 210.0   MCV 86.3 87.5   RDW 16.3 16.2   NEPRELIM 4.84 2.93     Recent Labs   Lab 11/28/24  1504 11/29/24  0627    143   K 3.5 3.6    106   CO2 28.0 30.0   BUN 10 10   CREATSERUM 0.73 0.69   GLU 93 95   CA 9.5 9.5   TP 6.9  --    ALB 4.6  --    ALKPHO 87  --    AST 30  --    ALT 25  --    BILT 0.7  --      No results for input(s): \"PT\", \"INR\", \"PTT\", \"FIB\" in the last 168 hours.  Imaging:    MRI brain 11/28: CONCLUSION:    1. There is no evidence of acute ischemia, hemorrhage or mass.   2. Atypical T2 hyperintensity noted symmetrically and bilaterally and periaqueductal region on image described above can be associated with thiamine deficiency or other metabolic abnormalities.  Correlation with any known clinical history is necessary.   3. There is chronic small vessel ischemic change noted.       MRI cervical spine w+wo 11/29/24: CONCLUSION:     1. No high-grade spinal canal stenosis.    2. Right paracentral posterior disc osteophyte complex at C5-C6 is causing severe right neural foraminal stenosis.     MRI thoracic/lumbar spine w+wo 11/29/24: CONCLUSION:     1. No signal abnormality in the thoracic spinal cord.  No significant spinal canal or neural foraminal stenosis in the thoracic spine.  No enhancing lesions in the thoracic spine.    2. No high-grade spinal canal or neural foraminal stenosis in the lumbar spine.  No enhancing lesions are identified.     Impression & Plan:     *Acute left sided weakness, hx of CVA w/ L sided weakness in 2022  -Seems to have improved since admission.  Her strength exam is normal at the time of my visit this morning.  Imaging is unrevealing.  -Bosutinib is not known to cause this issue and therefore I do not  think it is related.  Her CML is under good control, and therefore I do not believe that this is a culprit either.  -I wonder if this could be related to a flareup of old neurologic deficits from prior stroke due to acute illness from some other cause.  -She is on aspirin 81 mg daily at baseline  -Will defer further workup and management of these neurologic complaints to neurology.    *CML   -Followed by Dr. Ordonez, currently on bosutinib with favorable response.  Patient attributes a lot of side effects to bosutinib though it is unclear to what extent there is relation.  She has been off drug for the last few days and is feeling little better.  -Advised her to remain off bosutinib while she remains hospitalized but once he is discharged home I encouraged her to resume bosutinib 300 mg daily which is a reduced dose from prior.  Will arrange for her to follow-up with Dr. Ordonez in the next few weeks.  If she is not able to tolerate dose reduced bosutinib, an alternate TKI could be considered in the future.    Will follow peripherally at this time, please contact if any further hematology/oncology input is needed    Darien Turcios MD  Hematology/Medical Oncology  [facility]         [1]   No current facility-administered medications on file prior to encounter.     Current Outpatient Medications on File Prior to Encounter   Medication Sig Dispense Refill    famotidine 20 MG Oral Tab Take 1 tablet (20 mg total) by mouth 2 (two) times daily.      ondansetron (ZOFRAN) 8 MG tablet Take 1 tablet (8 mg total) by mouth every 8 (eight) hours as needed for Nausea. 30 tablet 3    LISINOPRIL 20 MG Oral Tab Take 1 tablet (20 mg total) by mouth 2 (two) times daily. 60 tablet 0    FLUoxetine HCl 20 MG Oral Tab Take 1 tablet (20 mg total) by mouth daily.      atorvastatin 80 MG Oral Tab Take 1 tablet (80 mg total) by mouth nightly.      carvedilol 3.125 MG Oral Tab Take 1 tablet (3.125 mg total) by mouth 2 (two) times daily  with meals.      aspirin 81 MG Oral Tab EC Take 1 tablet (81 mg total) by mouth daily.      zolpidem 10 MG Oral Tab Take 1 tablet (10 mg total) by mouth daily. (Patient taking differently: Take 1 tablet (10 mg total) by mouth nightly.) 60 tablet 0    HYDROcodone-acetaminophen 5-325 MG Oral Tab Take 1-2 tablets by mouth 3 (three) times daily as needed. 30 tablet 0    clonazePAM 1 MG Oral Tab Take 1 tablet (1 mg total) by mouth 2 (two) times daily. 60 tablet 1    Mometasone Furoate 0.1 % External Cream Apply 1 Application topically daily. 60 g 3    POTASSIUM CHLORIDE ER 20 MEQ Oral Tab CR TAKE 1 TABLET BY MOUTH DAILY (Patient taking differently: Take 20 mEq by mouth daily as needed (cramping in legs).) 90 tablet 11    Bosutinib 100 MG Oral Tab Take 400 mg by mouth daily. (Patient not taking: Reported on 11/11/2024) 120 tablet 11    NON FORMULARY Medical cannabis as needed.     [2]   Allergies  Allergen Reactions    Amlodipine ANAPHYLAXIS    Clopidogrel WHEEZING and RASH    Metoclopramide HIVES and OTHER (SEE COMMENTS)     Cerner Allergy Text Annotation: Reglan, Cerner Reaction: hyper  Cerner Allergy Text Annotation: Reglan, Cerner Reaction: hyper      Mirtazapine Coughing, PALPITATIONS, Runny nose and SWELLING    Tramadol UNKNOWN    Aspirin ITCHING    Ciprofloxacin NAUSEA AND VOMITING and OTHER (SEE COMMENTS)     Cerner Allergy Text Annotation: ciprofloxacin, Cerner Reaction: vomit      Niacin And Related UNKNOWN     Other reaction(s): Rash    Opioid Analgesics OTHER (SEE COMMENTS), UNKNOWN and NAUSEA AND VOMITING     Per pt  Cerner Allergy Text Annotation: morphine, Cerner Reaction: chest pain      Sulfamethoxazole W/Trimethoprim NAUSEA AND VOMITING    Amoxicillin-Pot Clavulanate OTHER (SEE COMMENTS)     Upset her stomach with Augmentin.  Patient took amoxicillin without problem.    Gabapentin OTHER (SEE COMMENTS)    Sertraline UNKNOWN     Cerner Allergy Text Annotation: Zoloft      Trazodone UNKNOWN    Sulfa  Antibiotics OTHER (SEE COMMENTS) and RASH     Cerner Allergy Text Annotation: sulfa drugs, Cerner Reaction: hives

## 2024-11-29 NOTE — PLAN OF CARE
The patient is A/Ox4, On RA, no SOB, Tele - NSR, Afebrile, PRN Zofran given x1 for nausea.   Voids  Echo completed  MRI spine completed, neuro notified of the results  New consult to oncology  PT eval is pending  SW and Psych Liaison on consults  Safety precautions in place. Staff will continue to monitor.           Problem: CARDIOVASCULAR - ADULT  Goal: Absence of cardiac arrhythmias or at baseline  Description: INTERVENTIONS:  - Continuous cardiac monitoring, monitor vital signs, obtain 12 lead EKG if indicated  - Evaluate effectiveness of antiarrhythmic and heart rate control medications as ordered  - Initiate emergency measures for life threatening arrhythmias  - Monitor electrolytes and administer replacement therapy as ordered  Outcome: Progressing     Problem: NEUROLOGICAL - ADULT  Goal: Achieves stable or improved neurological status  Description: INTERVENTIONS  - Assess for and report changes in neurological status  - Initiate measures to prevent increased intracranial pressure  - Maintain blood pressure and fluid volume within ordered parameters to optimize cerebral perfusion and minimize risk of hemorrhage  - Monitor temperature, glucose, and sodium. Initiate appropriate interventions as ordered  Outcome: Progressing     Problem: PAIN - ADULT  Goal: Verbalizes/displays adequate comfort level or patient's stated pain goal  Description: INTERVENTIONS:  - Encourage pt to monitor pain and request assistance  - Assess pain using appropriate pain scale  - Administer analgesics based on type and severity of pain and evaluate response  - Implement non-pharmacological measures as appropriate and evaluate response  - Consider cultural and social influences on pain and pain management  - Manage/alleviate anxiety  - Utilize distraction and/or relaxation techniques  - Monitor for opioid side effects  - Notify MD/LIP if interventions unsuccessful or patient reports new pain  - Anticipate increased pain with activity  and pre-medicate as appropriate  Outcome: Progressing     Problem: SAFETY ADULT - FALL  Goal: Free from fall injury  Description: INTERVENTIONS:  - Assess pt frequently for physical needs  - Identify cognitive and physical deficits and behaviors that affect risk of falls.  - [place] fall precautions as indicated by assessment.  - Educate pt/family on patient safety including physical limitations  - Instruct pt to call for assistance with activity based on assessment  - Modify environment to reduce risk of injury  - Provide assistive devices as appropriate  - Consider OT/PT consult to assist with strengthening/mobility  - Encourage toileting schedule  Outcome: Progressing

## 2024-11-29 NOTE — PHYSICAL THERAPY NOTE
Order received, chart reviewed. Pt with pending MRI imaging of c-spine and t-spine for weakness. Will hold and follow as appropriate.     Ca Newman, PT, DPT  11/29/24

## 2024-11-29 NOTE — OCCUPATIONAL THERAPY NOTE
OT orders received, pt chart reviewed. Pt with pending MRI imaging of c-spine and t-spine for weakness. OT will hold at this time until results are finalized. Will continue to follow, will re-attempt as able.

## 2024-11-29 NOTE — H&P
[facility] H&P/Consult note       CC:   Chief Complaint   Patient presents with    Altered Mental Status    Chest Pain        PCP: Mag Jeffers MD    History of Present Illness: Patient is a 59 year old female with PMH sig for CAD with prior CABG, stent, HTN, HLD, CVA with left sided weakness, CML on immunotherpahy here for left sided numbness / weakness, headaches    Symptoms started with dizziness (described as LH, not vertigo, positional, worse with moving / turning head), and left ankle pain about 3-4 days ago. She then developed worsening posterior headache, numbness of left sided, tingling of left face, couldn't think properly. Symptoms were worse yesterday and came to ER, she thought could be related to electrolyte issues (hx of hyponatremia). She also reports that BP has been eelvated and has had occaisonal cp. Took nitroglycerin but symptoms became worse. She also reporsts arthritis in the chest from prior open heart surgery.   No fevers but reports night sweats          PMH  Past Medical History:    Achalasia    Anxiety    Back pain    C. difficile colitis    Chronic fatigue syndrome    Completed stroke (HCC)    Coronary atherosclerosis of native coronary artery    Overview:  Cath by DANIEL Gregg 06/13/2007 for R/S CP with radiation to her back and jaw.  RCA stent (done in [place]) . Tubular LAD/Diag stent of 80% was treated with kissing balloon. LVEF 50%.   CABG 07/30/2007 had CABG x4 by Dr JAYLYN Maher. KIMBLE-LAD, SVG-OM, SVG-PDA and SVG-Diag. Admitted with atypical CP Jul 2015, Trop (-), ECHO LVEF  55% w/o WMA, MPI done Jan 2015 negative for ischemia.       Degenerative joint disease (DJD) of lumbar spine    Diabetes mellitus (HCC)    Diverticulitis    Edema    Essential hypertension    Fibromyalgia    GERD (gastroesophageal reflux disease)    Insomnia    Leukemia (HCC)    Muscle spasm    Obesity    Obstructive sleep apnea    Old myocardial infarction    Snoring        PSH  Past Surgical History:   Procedure  Laterality Date    Appendectomy      Cabg, arterial, four+          Cholecystectomy      Colonoscopy      Hysterectomy      partial, not due to cancer    Oophorectomy Right     noncancer,        ALL:  Allergies[1]     Home Medications:  Medications Taking[2]      Soc Hx  Social History     Tobacco Use    Smoking status: Former     Current packs/day: 0.00     Types: Cigarettes     Quit date:      Years since quittin.9    Smokeless tobacco: Never   Substance Use Topics    Alcohol use: No     Comment: occ        Fam Hx  Family History   Problem Relation Age of Onset    Cancer Paternal Grandfather         pancreatic ca    Other (malignant neoplasm of pancrease) Paternal Grandfather     Stroke Brother     Other (lupus anticoagulant) Brother        Review of Systems  Comprehensive ROS reviewed and negative except for what's stated above.  I     OBJECTIVE:  BP (!) 162/93 (BP Location: Right arm)   Pulse 66   Temp 98.3 °F (36.8 °C) (Oral)   Resp 20   Ht 5' 3\" (1.6 m)   Wt 164 lb 8 oz (74.6 kg)   LMP  (LMP Unknown)   SpO2 93%   BMI 29.14 kg/m²   General:  Alert, no distress, appears stated age.   Head:  Normocephalic,     Eyes:  Sclera anicteric, No conjunctival pallor, EOMs intact.    Throat: dry   Neck: Supple    Lungs:   Clear to auscultation bilaterally    Chest wall:  No tenderness or deformity.   Heart:  Regular rate and rhythm,    Abdomen:   Soft, NT/ND    Extremities: Atraumatic, no cyanosis or edema.   Skin: No visible rashes or lesions.    Neurologic: Mild left sided weakness, mostly distal. Nor drift, normal ftn     Diagnostic Data:    CBC/Chem  Recent Labs   Lab 24  1504 24  0627   WBC 6.9 5.0   HGB 14.9 15.2   MCV 86.3 87.5   .0 210.0       Recent Labs   Lab 24  1504 24  0627    143   K 3.5 3.6    106   CO2 28.0 30.0   BUN 10 10   CREATSERUM 0.73 0.69   GLU 93 95   CA 9.5 9.5       Recent Labs   Lab 24  1504   ALT 25   AST 30   ALB 4.6        No results for input(s): \"TROP\" in the last 168 hours.    Additional Diagnostics: ECG: sinus rhythm         Radiology: MRI BRAIN (W+WO) (CPT=70553)    Result Date: 11/28/2024  PROCEDURE:  MRI BRAIN (W+WO) (CPT=70553)  COMPARISON:  None.  INDICATIONS:  Left sided weakness, h/o CLL  TECHNIQUE:  MRI of the brain was performed with multi-planar T1, T2-weighted images with FLAIR sequences and diffusion weighted images without and with infusion.  PATIENT STATED HISTORY:(As transcribed by Technologist)  Dizziness, left sided weakness, pain to back of head.  History of 2 prior strokes and leukemia.   CONTRAST USED:  22 mL of Dotarem  FINDINGS:  INTRACRANIAL:  There are small foci of FLAIR hyperintensity right basal ganglia and in the devonte.  This most likely indicates chronic small vessel ischemic change.  Bilateral T2 hyperintensity in the periaqueductal region is noted for example series 9, image 11. This appearance is not typical for small vessel ischemic change and can be associated with thiamine deficiency (Wernicke encephalopathy).  Correlation with any clinical findings is necessary. VENTRICLES/SULCI:   Ventricles and sulci are normal in caliber.  There are no extra-axial fluid collections.  There is no midline shift. SINUSES/ORBITS:       The visualized paranasal sinuses are clear.  The orbits are unremarkable. MASTOIDS:       The mastoids are clear.            CONCLUSION:  1. There is no evidence of acute ischemia, hemorrhage or mass. 2. Atypical T2 hyperintensity noted symmetrically and bilaterally and periaqueductal region on image described above can be associated with thiamine deficiency or other moderate polyp abnormalities.  Correlation with any known clinical history is necessary. 3. There is chronic small vessel ischemic change noted.    LOCATION:  [facility]    Dictated by (CST): Vasquez Crane MD on 11/28/2024 at 5:34 PM     Finalized by (CST): Vasquez Crane MD on 11/28/2024 at 5:41 PM       CTA BRAIN +  CTA CAROTIDS (CPT=70496/14102)    Result Date: 11/28/2024  PROCEDURE:  CTA BRAIN + CTA CAROTIDS (CPT=70496/19532)  COMPARISON:  None.  INDICATIONS:  Left sided weakness  TECHNIQUE:  CT angiography of the head and neck were obtained with non-ionic contrast.  3D volume rendering images were obtained by the technologist as well as the radiologist on an independent workstation.  Multiplanar 3D reformatted imaging including multiplanar MIP images were obtained.  Curved planar reformats were performed through the carotid and vertebral arteries. All measurements obtained in this exam were performed using NASCET criteria.  Dose reduction techniques were used. Dose information is transmitted to the ACR (American College of Radiology) NRDR (National Radiology Data Registry) which includes the Dose Index Registry.  PATIENT STATED HISTORY:(As transcribed by Technologist)  left sided weakness   CONTRAST USED:  75 mLcc of Isovue 370  FINDINGS:  VASCULATURE:  There is atherosclerotic disease in the intracranial cavernous carotid arteries bilaterally without evidence of significant stenosis. VENTRICLES:  Normal for age.  No enlargement or displacement. CEREBRUM:  Areas of decreased attenuation right basal ganglia are most likely result of chronic small vessel ischemic change. CEREBELLUM:  Normal for age.  No excessive atrophy, mass, or hemorrhage, or abnormal enhancement. BRAINSTEM:  Normal for age.  No excessive atrophy, mass, or hemorrhage, or abnormal enhancement. BASAL CISTERNS:  No subarachnoid hemorrhage or effacement.  SKULL:  Negative.   LEFT INTERNAL CAROTID:  Densely calcified plaque proximal internal carotid artery causes 40% stenosis. EXTERNAL CAROTID:  No hemodynamically significant stenosis or dissection COMMON CAROTID:  Mild calcified plaque at vessel origin does not cause significant stenosis. VERTEBRAL:  No hemodynamically significant stenosis or dissection  RIGHT INTERNAL CAROTID:   Calcified plaque proximal  internal carotid artery causes approximately 40% stenosis. EXTERNAL CAROTID:    No hemodynamically significant stenosis or dissection COMMON CAROTID:   No hemodynamically significant stenosis or dissection VERTEBRAL:   No hemodynamically significant stenosis or dissection  OTHER:  The visualized soft tissues of the neck are also unremarkable.              CONCLUSION:  1. Intracranial CT angiogram demonstrates no large branch occlusion or aneurysm. 2. CT angiogram of neck demonstrates calcified plaque in proximal internal carotid arteries bilaterally that causes approximately 40% stenosis bilaterally. 3. Focal decreased attenuation right basal ganglia is most likely the result of chronic small vessel ischemic change.  If there is clinical evidence of acute ischemia then further evaluation with MRI is recommended.   LOCATION:  [facility]   Dictated by (CST): Vasquez Crane MD on 11/28/2024 at 4:31 PM     Finalized by (CST): Vasquez Crane MD on 11/28/2024 at 4:36 PM       XR CHEST AP PORTABLE  (CPT=71045)    Result Date: 11/28/2024  PROCEDURE:  XR CHEST AP PORTABLE  (CPT=71045)  TECHNIQUE:  AP chest radiograph was obtained.  COMPARISON:  None.  INDICATIONS:  ams, low na  PATIENT STATED HISTORY: (As transcribed by Technologist)  Pt to ER with complaints of not feeling good. Hx of leukemia. Reports cannot think right, tingling on left side for 34 x days. Nausea, abd bloating. Htn. Multiple complaints.    FINDINGS:  Calcified nodule right lower lung is noted.  Remainder of lungs is clear.  Heart size is within normal limits.  Mediastinum and gab are unremarkable.  There are sternal wires at the midline.  Chest wall structures are otherwise unremarkable.            CONCLUSION:  There is no evidence of active cardiopulmonary disease.   LOCATION:  [facility]      Dictated by (CST): Vasquez Crane MD on 11/28/2024 at 3:34 PM     Finalized by (CST): Vasquez Crane MD on 11/28/2024 at 3:34 PM          ASSESSMENT / PLAN:      Patient is a  59 year old female with PMH sig for CAD with prior CABG, stent, HTN, HLD, CVA with left sided weakness, CML on immunotherpahy here for left sided numbness / weakness, headaches    Impression    -Left sided numbness  -CML    -cva with left sided weakness 2022    -CAD with hx of CABG x 4 2007, hx of stent  -HTN  -HLD    -DM 2 - per chart but gluc controlled and not on meds  -ABNER     Plan    *left sided weakness / headaches / numbness  -unclear etiology of symptoms  Mri head neg fro acute stroke  MRI c spine pending  Check b12 level  -neuro / onc consulted    *CAD  -on aspirin  -tele  -reports intermittent chest pains. Will check trop and repeat echo. Some of the symptoms appear msk    *HTN  -coreg, lisinopril     *HLD  -statin    Scds  Hearin      Further recommendations pending patient's clinical course. [facility] hospitalist to continue to follow patient while in house    Patient and/or patient's family given opportunity to ask questions and note understanding and agreeing with therapeutic plan as outlined    Johnathon Dang Hospitalist  [phone]  Answering Service: [phone]           [1]   Allergies  Allergen Reactions    Amlodipine ANAPHYLAXIS    Clopidogrel WHEEZING and RASH    Metoclopramide HIVES and OTHER (SEE COMMENTS)     Cerner Allergy Text Annotation: Reglan, Cerner Reaction: hyper  Cerner Allergy Text Annotation: Reglan, Cerner Reaction: hyper      Mirtazapine Coughing, PALPITATIONS, Runny nose and SWELLING    Tramadol UNKNOWN    Aspirin ITCHING    Ciprofloxacin NAUSEA AND VOMITING and OTHER (SEE COMMENTS)     Cerner Allergy Text Annotation: ciprofloxacin, Cerner Reaction: vomit      Niacin And Related UNKNOWN     Other reaction(s): Rash    Opioid Analgesics OTHER (SEE COMMENTS), UNKNOWN and NAUSEA AND VOMITING     Per pt  Cerner Allergy Text Annotation: morphine, Cerner Reaction: chest pain      Sulfamethoxazole W/Trimethoprim NAUSEA AND VOMITING    Amoxicillin-Pot Clavulanate OTHER (SEE  COMMENTS)     Upset her stomach with Augmentin.  Patient took amoxicillin without problem.    Gabapentin OTHER (SEE COMMENTS)    Sertraline UNKNOWN     Cerner Allergy Text Annotation: Zoloft      Trazodone UNKNOWN    Sulfa Antibiotics OTHER (SEE COMMENTS) and RASH     Cerner Allergy Text Annotation: sulfa drugs, Cerner Reaction: hives     [2]   Outpatient Medications Marked as Taking for the 11/28/24 encounter (Hospital Encounter)   Medication Sig Dispense Refill    famotidine 20 MG Oral Tab Take 1 tablet (20 mg total) by mouth 2 (two) times daily.      ondansetron (ZOFRAN) 8 MG tablet Take 1 tablet (8 mg total) by mouth every 8 (eight) hours as needed for Nausea. 30 tablet 3    LISINOPRIL 20 MG Oral Tab Take 1 tablet (20 mg total) by mouth 2 (two) times daily. 60 tablet 0    FLUoxetine HCl 20 MG Oral Tab Take 1 tablet (20 mg total) by mouth daily.      atorvastatin 80 MG Oral Tab Take 1 tablet (80 mg total) by mouth nightly.      carvedilol 3.125 MG Oral Tab Take 1 tablet (3.125 mg total) by mouth 2 (two) times daily with meals.      aspirin 81 MG Oral Tab EC Take 1 tablet (81 mg total) by mouth daily.      zolpidem 10 MG Oral Tab Take 1 tablet (10 mg total) by mouth daily. (Patient taking differently: Take 1 tablet (10 mg total) by mouth nightly.) 60 tablet 0    HYDROcodone-acetaminophen 5-325 MG Oral Tab Take 1-2 tablets by mouth 3 (three) times daily as needed. 30 tablet 0    clonazePAM 1 MG Oral Tab Take 1 tablet (1 mg total) by mouth 2 (two) times daily. 60 tablet 1    Mometasone Furoate 0.1 % External Cream Apply 1 Application topically daily. 60 g 3    POTASSIUM CHLORIDE ER 20 MEQ Oral Tab CR TAKE 1 TABLET BY MOUTH DAILY (Patient taking differently: Take 20 mEq by mouth daily as needed (cramping in legs).) 90 tablet 11
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
## 2024-11-29 NOTE — CONSULTS
[facility]   NEUROLOGY   CONSULT NOTE    Admission date: 11/28/2024  Reason for Consult: Left upper and lower extremity weakness.  Chief Complaint:   Chief Complaint   Patient presents with    Altered Mental Status    Chest Pain   ________________________________________________________________    History     History of Presenting Illness  59 year old female with multiple medical problems including recent diagnosis of CML currently on bosutinib consulted for left-sided weakness.  Patient states that she developed this left sided weakness progressively over the last week or so with left lower extremity more than the left upper extremity.  Yesterday symptoms were worse and she decided to come to the hospital.  She does not have significant pain.  There was no diplopia, dysarthria, dysphagia or facial weakness.  Today patient feels that her strength is better compared to yesterday especially her left upper extremity which is almost normal.  She feels that her left lower extremity slightly weak.  No numbness, paresthesias, bladder bowel incontinence/retention.  No symptoms on the right side.    History obtained from patient, chart review.    Past Medical History:    Achalasia    Anxiety    Back pain    C. difficile colitis    Chronic fatigue syndrome    Completed stroke (HCC)    Coronary atherosclerosis of native coronary artery    Overview:  Cath by DANIEL Gregg 06/13/2007 for R/S CP with radiation to her back and jaw.  RCA stent (done in [place]) . Tubular LAD/Diag stent of 80% was treated with kissing balloon. LVEF 50%.   CABG 07/30/2007 had CABG x4 by Dr JAYLYN Maher. KIMBLE-LAD, SVG-OM, SVG-PDA and SVG-Diag. Admitted with atypical CP Jul 2015, Trop (-), ECHO LVEF  55% w/o WMA, MPI done Jan 2015 negative for ischemia.       Degenerative joint disease (DJD) of lumbar spine    Diabetes mellitus (HCC)    Diverticulitis    Edema    Essential hypertension    Fibromyalgia    GERD (gastroesophageal reflux disease)     Insomnia    Leukemia (HCC)    Muscle spasm    Obesity    Obstructive sleep apnea    Old myocardial infarction    Snoring     Past Surgical History:   Procedure Laterality Date    Appendectomy      Cabg, arterial, four+          Cholecystectomy      Colonoscopy      Hysterectomy      partial, not due to cancer    Oophorectomy Right     noncancer,     Social History     Socioeconomic History    Marital status:    Tobacco Use    Smoking status: Former     Current packs/day: 0.00     Types: Cigarettes     Quit date:      Years since quittin.9    Smokeless tobacco: Never   Vaping Use    Vaping status: Never Used   Substance and Sexual Activity    Alcohol use: No     Comment: occ    Drug use: Yes     Types: Cannabis     Social Drivers of Health     Food Insecurity: No Food Insecurity (2024)    Food Insecurity     Food Insecurity: Never true   Transportation Needs: No Transportation Needs (2024)    Transportation Needs     Lack of Transportation: No    Received from [facility]    Social Connections   Housing Stability: Low Risk  (2024)    Housing Stability     Housing Instability: No     Family History   Problem Relation Age of Onset    Cancer Paternal Grandfather         pancreatic ca    Other (malignant neoplasm of pancrease) Paternal Grandfather     Stroke Brother     Other (lupus anticoagulant) Brother      Allergies Allergies[1]    Home Meds  Current Outpatient Medications   Medication Instructions    aspirin 81 mg, Daily    atorvastatin (LIPITOR) 80 mg, Nightly    Bosutinib 400 mg, Oral, Daily    carvedilol (COREG) 3.125 mg, 2 times daily with meals    clonazePAM (KLONOPIN) 1 mg, Oral, 2 times daily    famotidine (PEPCID) 20 mg, 2 times daily    FLUoxetine HCl (PROZAC) 20 mg, Daily    HYDROcodone-acetaminophen 5-325 MG Oral Tab 1-2 tablets, Oral, 3 times daily PRN    lisinopril (PRINIVIL; ZESTRIL) 20 mg, Oral, 2 times daily    Mometasone Furoate 0.1 % External  Cream 1 Application , Topical, Daily    NON FORMULARY Medical cannabis as needed.    ondansetron (ZOFRAN) 8 mg, Oral, Every 8 hours PRN    POTASSIUM CHLORIDE ER 20 MEQ Oral Tab CR TAKE 1 TABLET BY MOUTH DAILY    zolpidem (AMBIEN) 10 mg, Oral, Daily     Scheduled Meds:   famotidine  20 mg Oral BID    aspirin  81 mg Oral Daily    atorvastatin  80 mg Oral Nightly    carvedilol  3.125 mg Oral BID with meals    FLUoxetine HCl  20 mg Oral Daily    lisinopril  20 mg Oral BID    enoxaparin  40 mg Subcutaneous Daily     Continuous Infusions:  PRN Meds:  calcium carbonate    calcium carbonate    clonazePAM    HYDROcodone-acetaminophen    melatonin    ondansetron    OBJECTIVE   VITAL SIGNS:   Temp:  [97.9 °F (36.6 °C)-98.3 °F (36.8 °C)] 98.3 °F (36.8 °C)  Pulse:  [55-73] 66  Resp:  [12-23] 20  BP: (157-193)/(77-93) 162/93  SpO2:  [92 %-95 %] 93 %    PHYSICAL EXAM:    NEUROLOGIC:    Mental Status:  A&O x 4, Follows simple commands, no obvious aphasia or dysarthria  Cranial nerves: PERRL.  Visual fields full.  EOMI.  Face symmetric with normal movement bilaterally.  Hearing grossly intact. Tongue midline with normal movements.   Motor: Drift:  Absent; Motor exam is 5 out of 5 in all extremities bilaterally, except may be mild weakness in left lower extremity.  Reflexes decreased.  Sensation: Intact to light touch bilaterally  Cerebellar: Normal Finger-To-Nose test      LABORATORY DATA:  Last 24 hour labs were reviewed in detail.  Recent Labs   Lab 11/28/24  1504 11/29/24  0627    143   K 3.5 3.6    106   CO2 28.0 30.0   GLU 93 95   BUN 10 10   CREATSERUM 0.73 0.69     Recent Labs   Lab 11/28/24  1504 11/29/24  0627   WBC 6.9 5.0   HGB 14.9 15.2   .0 210.0     Recent Labs   Lab 11/28/24  1504   ALT 25   AST 30     No results for input(s): \"MG\", \"PHOS\" in the last 168 hours.  Last A1c value was 5.4% done 4/6/2022.     Radiology:    MRI THORACIC+LUMBAR SPINE (ALL W+WO) (CPT=72157/02025)    Result Date:  11/29/2024  CONCLUSION:   1. No signal abnormality in the thoracic spinal cord.  No significant spinal canal or neural foraminal stenosis in the thoracic spine.  No enhancing lesions in the thoracic spine.  2. No high-grade spinal canal or neural foraminal stenosis in the lumbar spine.  No enhancing lesions are identified.    LOCATION:  [id number]    Dictated by (CST): Dima Arana MD on 11/29/2024 at 3:08 PM     Finalized by (CST): Dima Arana MD on 11/29/2024 at 3:21 PM       MRI SPINE CERVICAL (W+WO) (CPT=72156)    Result Date: 11/29/2024  CONCLUSION:   1. No high-grade spinal canal stenosis.  2. Right paracentral posterior disc osteophyte complex at C5-C6 is causing severe right neural foraminal stenosis.    LOCATION:  [id number]   Dictated by (CST): Dima Arana MD on 11/29/2024 at 2:29 PM     Finalized by (CST): Dima Arana MD on 11/29/2024 at 2:32 PM       MRI BRAIN (W+WO) (CPT=70553)    Result Date: 11/28/2024  CONCLUSION:  1. There is no evidence of acute ischemia, hemorrhage or mass. 2. Atypical T2 hyperintensity noted symmetrically and bilaterally and periaqueductal region on image described above can be associated with thiamine deficiency or other moderate polyp abnormalities.  Correlation with any known clinical history is necessary. 3. There is chronic small vessel ischemic change noted.    LOCATION:  [facility]    Dictated by (CST): Vasquez Craen MD on 11/28/2024 at 5:34 PM     Finalized by (CST): Vasquez Crane MD on 11/28/2024 at 5:41 PM       CTA BRAIN + CTA CAROTIDS (CPT=70496/51776)    Result Date: 11/28/2024  CONCLUSION:  1. Intracranial CT angiogram demonstrates no large branch occlusion or aneurysm. 2. CT angiogram of neck demonstrates calcified plaque in proximal internal carotid arteries bilaterally that causes approximately 40% stenosis bilaterally. 3. Focal decreased attenuation right basal ganglia is most likely the result of chronic small vessel ischemic change.  If there  is clinical evidence of acute ischemia then further evaluation with MRI is recommended.   LOCATION:  [place]   Dictated by (CST): Vasquez Crane MD on 11/28/2024 at 4:31 PM     Finalized by (CST): Vasquez Crane MD on 11/28/2024 at 4:36 PM      ASSESSMENT/PLAN   59 year old female with:    Left upper and lower extremity weakness, progressive over the last 1 week, lower extremity more than upper extremity, with interval improvement in the last 24 hours, without pain.  Findings less likely to be due to TIA/CVA.  Patient has concerns regarding the findings due to patient's CML medication.  Advised to discuss this further with her hematologist/oncologist.  Abnormal MRI of the brain reporting atypical T2 hyperintensity symmetrical bilaterally and in the periaqueductal region, concerning for possible thiamine deficiency/other etiologies.  Will start patient on thiamine supplementation.  Awaiting to get input from oncology/hematology regarding findings related to CML medication and MRI abnormality.  May need to be considered for repeat MRI in 2 to 3 weeks and or spinal tap.  Case discussed with patient and her  in detail.  All questions answered.  Will continue to follow.      Principal Problem:    Left-sided weakness  Active Problems:    Hypokalemia    Altered mental status, unspecified altered mental status type       Damian Jane MD  Neurohospitalist  [facility]    Disclaimer: This record was dictated using Dragon software. There may be errors due to voice recognition problems that were not realized and corrected during the completion of the note.           [1]   Allergies  Allergen Reactions    Amlodipine ANAPHYLAXIS    Clopidogrel WHEEZING and RASH    Metoclopramide HIVES and OTHER (SEE COMMENTS)     Cerner Allergy Text Annotation: Reglan, Cerner Reaction: hyper  Cerner Allergy Text Annotation: Reglan, Cerner Reaction: hyper      Mirtazapine Coughing, PALPITATIONS, Runny nose and SWELLING     Tramadol UNKNOWN    Aspirin ITCHING    Ciprofloxacin NAUSEA AND VOMITING and OTHER (SEE COMMENTS)     Cerner Allergy Text Annotation: ciprofloxacin, Cerner Reaction: vomit      Niacin And Related UNKNOWN     Other reaction(s): Rash    Opioid Analgesics OTHER (SEE COMMENTS), UNKNOWN and NAUSEA AND VOMITING     Per pt  Cerner Allergy Text Annotation: morphine, Cerner Reaction: chest pain      Sulfamethoxazole W/Trimethoprim NAUSEA AND VOMITING    Amoxicillin-Pot Clavulanate OTHER (SEE COMMENTS)     Upset her stomach with Augmentin.  Patient took amoxicillin without problem.    Gabapentin OTHER (SEE COMMENTS)    Sertraline UNKNOWN     Cerner Allergy Text Annotation: Zoloft      Trazodone UNKNOWN    Sulfa Antibiotics OTHER (SEE COMMENTS) and RASH     Cerner Allergy Text Annotation: sulfa drugs, Cerner Reaction: hives

## 2024-11-29 NOTE — ED QUICK NOTES
Orders for admission, patient is aware of plan and ready to go upstairs. Any questions, please call ED RN haley at extension b pod.     Patient Covid vaccination status: Fully vaccinated     COVID Test Ordered in ED: None    COVID Suspicion at Admission: N/A    Running Infusions:  None    Mental Status/LOC at time of transport: left sided weakness.     Other pertinent information:   CIWA score: N/A   NIH score:  3

## 2024-11-29 NOTE — PLAN OF CARE
NURSING ADMISSION NOTE      Patient admitted via Cart  Oriented to room.  Safety precautions initiated.  Bed in low position.  Call light in reach.  Received patient from ED via cart, alert and oriented but very anxious and tearful. On room ai, breath sounds clear. On tele SR. No c/o pain voiced. Admission assessment completed.

## 2024-11-29 NOTE — PROGRESS NOTES
11/28/24 5932   BiPAP   $ RT Standby Charge (per 15 min) 1   BiPAP/CPAP Monitored Parameters   Toleration Refused     Patient states that she does not wear a CPAP at home when sleeping.

## 2024-11-29 NOTE — DIETARY NOTE
[facility]   part of [facility]   CLINICAL NUTRITION    Dyana Souza     Admitting diagnosis:  Left-sided weakness [R53.1]  Altered mental status, unspecified altered mental status type [R41.82]    Ht: 160 cm (5' 3\")  Wt: 74.6 kg (164 lb 8 oz).   Body mass index is 29.14 kg/m².  IBW: 52.3kg    Wt Readings from Last 6 Encounters:   11/28/24 74.6 kg (164 lb 8 oz)   11/11/24 76.7 kg (169 lb)   10/02/24 75.5 kg (166 lb 8 oz)   09/16/24 75 kg (165 lb 4.8 oz)   09/16/24 75.5 kg (166 lb 8 oz)   08/13/24 81.6 kg (180 lb)        Labs/Meds reviewed    Diet:       Procedures    Cardiac diet Cardiac; Is Patient on Accuchecks? No     Percent Meals Eaten (last 3 days)       Date/Time Percent Meals Eaten (%)    11/29/24 1000 100 %          59 year old female admitted with left sided weakness.  Pt with h/o CML on immunotherapy, CAD sp CABG, HTN CVA    Pt chart reviewed d/t MST 2.  Patient reports good appetite at this time.  Nursing notes reports Percent Meals Eaten (%): 100 % intake for last meal.  Tolerating po diet without diarrhea, emesis, or constipation.   No significant weight changes noted.     Patient is at low nutrition risk at this time.    Please consult if patient status changes or nutrition issues arise.    Celia Hugo, RD,LDN  Clinical Dietitian  [id number]

## 2024-11-30 PROBLEM — C92.10 CML (CHRONIC MYELOID LEUKEMIA) (HCC): Status: ACTIVE | Noted: 2024-11-30

## 2024-11-30 LAB
ANION GAP SERPL CALC-SCNC: 6 MMOL/L (ref 0–18)
BUN BLD-MCNC: 11 MG/DL (ref 9–23)
CALCIUM BLD-MCNC: 9.5 MG/DL (ref 8.7–10.4)
CHLORIDE SERPL-SCNC: 105 MMOL/L (ref 98–112)
CO2 SERPL-SCNC: 30 MMOL/L (ref 21–32)
CREAT BLD-MCNC: 0.64 MG/DL
EGFRCR SERPLBLD CKD-EPI 2021: 102 ML/MIN/1.73M2 (ref 60–?)
ERYTHROCYTE [DISTWIDTH] IN BLOOD BY AUTOMATED COUNT: 16.2 %
GLUCOSE BLD-MCNC: 105 MG/DL (ref 70–99)
GLUCOSE BLD-MCNC: 78 MG/DL (ref 70–99)
GLUCOSE BLD-MCNC: 92 MG/DL (ref 70–99)
GLUCOSE BLD-MCNC: 94 MG/DL (ref 70–99)
GLUCOSE BLD-MCNC: 98 MG/DL (ref 70–99)
HCT VFR BLD AUTO: 43.5 %
HGB BLD-MCNC: 14.4 G/DL
MAGNESIUM SERPL-MCNC: 2 MG/DL (ref 1.6–2.6)
MCH RBC QN AUTO: 29.4 PG (ref 26–34)
MCHC RBC AUTO-ENTMCNC: 33.1 G/DL (ref 31–37)
MCV RBC AUTO: 88.8 FL
OSMOLALITY SERPL CALC.SUM OF ELEC: 291 MOSM/KG (ref 275–295)
PLATELET # BLD AUTO: 209 10(3)UL (ref 150–450)
POTASSIUM SERPL-SCNC: 3.3 MMOL/L (ref 3.5–5.1)
RBC # BLD AUTO: 4.9 X10(6)UL
SODIUM SERPL-SCNC: 141 MMOL/L (ref 136–145)
WBC # BLD AUTO: 4.3 X10(3) UL (ref 4–11)

## 2024-11-30 PROCEDURE — 99223 1ST HOSP IP/OBS HIGH 75: CPT | Performed by: INTERNAL MEDICINE

## 2024-11-30 RX ORDER — MELATONIN
100 DAILY
Status: DISCONTINUED | OUTPATIENT
Start: 2024-11-30 | End: 2024-12-01

## 2024-11-30 RX ORDER — CYCLOBENZAPRINE HCL 10 MG
10 TABLET ORAL 3 TIMES DAILY PRN
Status: DISCONTINUED | OUTPATIENT
Start: 2024-11-30 | End: 2024-12-01

## 2024-11-30 RX ORDER — POTASSIUM CHLORIDE 1500 MG/1
40 TABLET, EXTENDED RELEASE ORAL ONCE
Status: COMPLETED | OUTPATIENT
Start: 2024-11-30 | End: 2024-11-30

## 2024-11-30 NOTE — PLAN OF CARE
Assumed care at 1930  A&Ox4, RA, Tele-NSR/SB  PRN Norco given for pain  No N/V  Oncology/ PT/OT to see  Contact precautions in place  Up SB to bathroom  Patient updated on POC, all questions answered.

## 2024-11-30 NOTE — OCCUPATIONAL THERAPY NOTE
OCCUPATIONAL THERAPY EVALUATION - INPATIENT    Room Number: 7623/7623-A  Evaluation Date: 11/30/2024     Type of Evaluation: Initial  Presenting Problem: L sided weakness, hypokalemia, severe R neural foraminal stenosis C5-C6    Physician Order: IP Consult to Occupational Therapy  Reason for Therapy:  ADL/IADL Dysfunction and Discharge Planning  History related to admission: Admitted from home with L sided weakness.  Imaging negative for CVA  MRI brain :  CONCLUSION:    1. There is no evidence of acute ischemia, hemorrhage or mass.   2. Atypical T2 hyperintensity noted symmetrically and bilaterally and periaqueductal region on image described above can be associated with thiamine deficiency or other moderate polyp abnormalities.  Correlation with any known clinical history is   necessary.   3. There is chronic small vessel ischemic change noted.        MRI cervical spine:  ONCLUSION:       1. No high-grade spinal canal stenosis.      2. Right paracentral posterior disc osteophyte complex at C5-C6 is causing severe right neural foraminal stenosis.    OCCUPATIONAL THERAPY ASSESSMENT   Patient is a 59 year old female admitted on 11/28/2024 with Presenting Problem: L sided weakness, hypokalemia, severe R neural foraminal stenosis C5-C6. Co-Morbidities : CML, CVA x3, MI x4, CABG,fibromyalgia, OCD, GEORGE, GERD, IBS, DM, DJD  Patient is currently functioning near baseline with toileting, lower body dressing, grooming, bed mobility, transfers, static sitting balance, dynamic sitting balance, static standing balance, dynamic standing balance, maintaining seated position, and functional standing tolerance.  Prior to admission, patient's baseline is independent.  Patient met all OT goals at independent level.  Patient reports no further questions/concerns at this time.     Patient will benefit from continued skilled OT Services with no additional skilled services but increased support at home    Recommendations for nursing  staff:   Transfers: up without device in room  Toileting location: Toilet    EVALUATION SESSION:  Patient at start of session: supine    FUNCTIONAL TRANSFER ASSESSMENT  Sit to Stand: Edge of Bed  Edge of Bed: Independent  Toilet Transfer: Independent    BED MOBILITY  Supine to Sit : Independent  Sit to Supine (OT): Independent    BALANCE ASSESSMENT     FUNCTIONAL ADL ASSESSMENT  LB Dressing Seated: Independent  LB Dressing Standing: Independent  Toileting Seated: Independent  Toileting Standing: Independent    ACTIVITY TOLERANCE: stable on room air                         O2 SATURATIONS       COGNITION  Overall Cognitive Status:  WFL - within functional limits    COGNITION ASSESSMENTS     Upper Extremity:   ROM: within functional limits   Strength: is within functional limits RUE 5/5, LUE 4+ to 5/5  Coordination:  Gross motor: wfl  Fine motor: wfl  Sensation: denied UE deficits    EDUCATION PROVIDED  Patient Education : Role of Occupational Therapy; Plan of Care; Other (avoid heavy lifting for spine health/precs for comfort)  Patient's Response to Education: Verbalized Understanding    Equipment used: none  Demonstrates functional use    Therapist comments: Patient was able to easily transfer supine to sit and doff/don socks using figure four position. Patient spent increased time explaining her medical and social  history. She demonstrated independence with in room mobility, toilet transfer, simulated toileting. Patient reported that her strength is improved today. OT educated patient on benefit of smartwatch with fall detection . Education given on potential benefit of following spine precautions to avoid strain to cervical area.    Patient End of Session: Hospital anti-slip socks;All patient questions and concerns addressed;RN aware of session/findings;Call light within reach;Needs met;In bed    OCCUPATIONAL PROFILE    HOME SITUATION  Type of Home: House  Home Layout: Two level;Able to live on main level;Other  (Comment) (may be staying with family members upon DC)  Lives With: Spouse;Family (brother in law)    Toilet and Equipment: Other (Comment);Standard height toilet  Shower/Tub and Equipment: Walk-in shower;Shower chair;Grab bar;Hand-held showerhead       Occupation/Status: retired  Hand Dominance: Right  Drives: Yes  Patient Regularly Uses: Glasses    Prior Level of Function:     SUBJECTIVE  Pleasant and participatory  \"Ok what's next dear?\"    PAIN ASSESSMENT  Rating: Unable to rate  Location: base of neck and headache  Management Techniques: Nurse notified;Other (Comment) (RN gave meds and aplpied pain patch)    OBJECTIVE  Precautions: Other (Comment) (spine precs for comfort)  Fall Risk: Standard fall risk    WEIGHT BEARING RESTRICTION       AM-PAC ‘6-Clicks’ Inpatient Daily Activity Short Form  -   Putting on and taking off regular lower body clothing?: None  -   Bathing (including washing, rinsing, drying)?: None  -   Toileting, which includes using toilet, bedpan or urinal? : None  -   Putting on and taking off regular upper body clothing?: None  -   Taking care of personal grooming such as brushing teeth?: None  -   Eating meals?: None    AM-PAC Score:  Score: 24  Approx Degree of Impairment: 0%  Standardized Score (AM-PAC Scale): 57.54    ADDITIONAL TESTS     NEUROLOGICAL FINDINGS  Coordination - Finger Opposition: Symmetrical; Other (Comment) (trace lag on LUE when asked to go faster)     PLAN   Patient has been evaluated and presents with no skilled Occupational Therapy needs at this time.  Patient discharged from Occupational Therapy services.  Please re-order if a new functional limitation presents during this admission.    OT Device Recommendations: Other (Comment) (smartwatch with fall detction)    Patient Evaluation Complexity Level:   Occupational Profile/Medical History MODERATE - Expanded review of history including review of medical or therapy record   Specific performance deficits impacting  engagement in ADL/IADL LOW  1 - 3 performance deficits    Client Assessment/Performance Deficits MODERATE - Comorbidities and min to mod modifications of tasks    Clinical Decision Making LOW - Analysis of occupational profile, problem-focused assessments, limited treatment options    Overall Complexity LOW     OT Session Time: 25 minutes  Self-Care Home Management: 23 minutes

## 2024-11-30 NOTE — PHYSICAL THERAPY NOTE
PHYSICAL THERAPY EVALUATION - INPATIENT     Room Number: 7623/7623-A  Evaluation Date: 11/30/2024  Type of Evaluation: Initial  Physician Order: PT Eval and Treat    Presenting Problem: L side weakness  Co-Morbidities : CVA, Leukemia, CAD, CABG, stent,CML on immunotherapy  Reason for Therapy: Mobility Dysfunction and Discharge Planning    PHYSICAL THERAPY ASSESSMENT   Patient is a 59 year old female admitted 11/28/2024 for L side weakness with Brain MRI (-) acute changes.  Prior to admission, patient's baseline is ind/mod I in her mobilities s any use of an A.D.Patient is currently functioning below baseline with transfers, gait, stair negotiation, standing prolonged periods, and performing household tasks.  Patient is requiring stand-by assist and contact guard assist as a result of the following impairments: decreased functional strength, impaired standing(higher activity) balance, and decreased muscular endurance.  Physical Therapy will continue to follow for duration of hospitalization.    Patient will benefit from continued skilled PT Services at discharge to promote prior level of function and safety  and return home with OP PT. will assist as needed    PLAN DURING HOSPITALIZATION  Nursing Mobility Recommendation : 1 Assist  PT Device Recommendation: Rolling walker  PT Treatment Plan: Bed mobility;Body mechanics;Patient education;Gait training;Strengthening;Stair training;Transfer training;Balance training;Range of motion;Family education  Rehab Potential : Good  Frequency (Obs): 3-5x/week     CURRENT GOALS    Goal #1 Patient is able to demonstrate supine - sit EOB @ level: independent     Goal #2 Patient is able to demonstrate transfers Sit to/from Stand at assistance level: independent     Goal #3 Patient is able to ambulate 300 feet with assist device:  LRAD  at assistance level: modified independent     Goal #4 Pt will be ind/mod I in HEP for B LE while seated/supine     Goal #5    Goal #6    Goal  Comments: Goals established on 2024      PHYSICAL THERAPY MEDICAL/SOCIAL HISTORY  History related to current admission: Patient is a 59 year old female admitted on 2024 from home for L sided weakness with hx of CVA affecting the L side. Stated however current symptoms is new as she was able to recover from the previous CVA      HOME SITUATION  Type of Home: House  Home Layout: Two level;Able to live on main level  Stairs to Enter : 2        Stairs to Bedroom: 0         Lives With: Spouse        Prior Level of Hardy: Stated that she limits her stair navigation due to hx of falls and \"incoordination\" ind/mod I in her mobilities s any use of an A.D.  can take her to OPPT    SUBJECTIVE  This is a newer weakness on my L side    OBJECTIVE     Fall Risk: Standard fall risk    WEIGHT BEARING RESTRICTION     PAIN ASSESSMENT  Ratin          COGNITION  Overall Cognitive Status:  WFL - within functional limits    RANGE OF MOTION AND STRENGTH ASSESSMENT  Upper extremity ROM and strength are within functional limits except for the followin-/5    Lower extremity ROM is within functional limits     Lower extremity strength is within functional limits except for the followin-/5    BALANCE  Static Sitting: Good  Dynamic Sitting: Good  Static Standing: Fair +  Dynamic Standing: Fair    ADDITIONAL TESTS      [place] score: 2/6    ACTIVITY TOLERANCE; Good        O2 WALK       NEUROLOGICAL FINDINGS: decrease speed with heel slide L LE                                                      speed with finger to nose on L UE     AM-PAC '6-Clicks' INPATIENT SHORT FORM - BASIC MOBILITY  How much difficulty does the patient currently have...  Patient Difficulty: Turning over in bed (including adjusting bedclothes, sheets and blankets)?: None   Patient Difficulty: Sitting down on and standing up from a chair with arms (e.g., wheelchair, bedside commode, etc.): None   Patient Difficulty: Moving from  lying on back to sitting on the side of the bed?: None   How much help from another person does the patient currently need...   Help from Another: Moving to and from a bed to a chair (including a wheelchair)?: None   Help from Another: Need to walk in hospital room?: A Little   Help from Another: Climbing 3-5 steps with a railing?: A Little     AM-PAC Score:  Raw Score: 22   Approx Degree of Impairment: 20.91%   Standardized Score (AM-PAC Scale): 53.28   CMS Modifier (G-Code): CJ    FUNCTIONAL ABILITY STATUS  Gait Assessment   Functional Mobility/Gait Assessment  Gait Assistance: Contact guard assist;Supervision  Distance (ft): 300  Assistive Device: Rolling walker  Pattern:  (slight drag on the L LE in comparison to the R LE due to some weakness)    Skilled Therapy Provided     Bed Mobility:  Rolling: mod I  Supine to sit: mod I   Sit to supine: mod I     Transfer Mobility:  Sit to stand: mod I   Stand to sit: mod I  Gait = RW SBA    Therapist's Comments:   Mobilities as above  Discussed recommendation for OPPT    Exercise/Education Provided:  Mobilities as above  Exes: standing on one leg, LAQ, SLR  HEP: DARCY AP  Discussed ambulation with staff RW as support at this time  Instructed to walk several times with staff while in hosp  Discussed OPPT recommendation  L side weaker than the R and stated this new  Left on the recliner and addressed all issues and concern    Patient End of Session: Up in chair;Needs met;Call light within reach;RN aware of session/findings;All patient questions and concerns addressed      Patient Evaluation Complexity Level:  History Moderate - 1 or 2 personal factors and/or co-morbidities   Examination of body systems Low -  addressing 1-2 elements   Clinical Presentation Low- Stable   Clinical Decision Making Low Complexity       PT Session Time: 30 minutes  Gait Training: 10 minutes  Therapeutic Activity: 5 minutes  Neuromuscular Re-education: 10 minutes

## 2024-11-30 NOTE — PROGRESS NOTES
CC: follow-up hospital admission L sided weakness    SUBJECTIVE:  Interval History:     Feels weakness is improved  Having headache from upper neck / back of head to the front       OBJECTIVE:  Scheduled Meds:    lidocaine-menthol  1 patch Transdermal Daily    famotidine  20 mg Oral BID    aspirin  81 mg Oral Daily    atorvastatin  80 mg Oral Nightly    carvedilol  3.125 mg Oral BID with meals    FLUoxetine HCl  20 mg Oral Daily    lisinopril  20 mg Oral BID    enoxaparin  40 mg Subcutaneous Daily     Continuous Infusions:   PRN Meds:   cyclobenzaprine    calcium carbonate    calcium carbonate    clonazePAM    HYDROcodone-acetaminophen    melatonin    ondansetron    PHYSICAL EXAM  Vital signs: Temp:  [98.1 °F (36.7 °C)-98.5 °F (36.9 °C)] 98.3 °F (36.8 °C)  Pulse:  [50-75] 67  Resp:  [11-18] 13  BP: (127-162)/(58-79) 156/74  SpO2:  [95 %-97 %] 95 %      GENERAL - NAD, AAO  EYES- sclera anicteric,   HENT- normocephalic, OP - MMM  NECK - no JVD  CV- RRR  RESP - CTAB, normal resp effort  ABDOMEN- soft, NT/ND   EXT- no LE edema,   PSYCH - normal mentation/ normal affect    Data Review:   Labs:   Recent Labs   Lab 11/28/24  1504 11/29/24  0627 11/30/24  0555   WBC 6.9 5.0 4.3   HGB 14.9 15.2 14.4   MCV 86.3 87.5 88.8   .0 210.0 209.0       Recent Labs   Lab 11/28/24  1504 11/29/24  0627 11/30/24  0555    143 141   K 3.5 3.6 3.3*    106 105   CO2 28.0 30.0 30.0   BUN 10 10 11   CREATSERUM 0.73 0.69 0.64   CA 9.5 9.5 9.5   MG  --   --  2.0   GLU 93 95 92       Recent Labs   Lab 11/28/24  1504   ALT 25   AST 30   ALB 4.6       Recent Labs   Lab 11/29/24  0500 11/29/24  2040 11/30/24  0547   PGLU 89 72 94           ASSESSMENT/PLAN:    Patient is a 59 year old female with PMH sig for CAD with prior CABG, stent, HTN, HLD, CVA with left sided weakness, CML on immunotherpahy here for left sided numbness / weakness, headaches     Impression     -Left sided numbness  -CML     -cva with left sided weakness 2022      -CAD with hx of CABG x 4 2007, hx of stent  -HTN  -HLD     -DM 2 - per chart but gluc controlled and not on meds  -ABNER     Plan     *left sided weakness / headaches / numbness  -unclear etiology of symptoms, could be related to prior stroke and exacerbation of symptoms  Mri head neg fro acute stroke  MRI c spine with C 5-6 osteophyte formation, she doesn't have R sided symptoms, would not explain her left sided symptoms.  Could cause her neck pain / headache. Will add flexeril, lido patch. Outpt nsg eval  Check b12 level - ok   -neuro / onc consulted     *CAD  -on aspirin  -tele  -reports intermittent chest pains. Will check trop and repeat echo. Some of the symptoms appear msk - echo unremarkable     *HTN  -coreg, lisinopril      *HLD  -statin     Scds  Hearin       Dispo - pending neuro eval and pt ot    Will continue to follow while hospitalized. Please page me or the on-call hospitalist with questions or concerns.    Johnathon Dang Hospitalist  [phone]  Answering Service: [phone]

## 2024-11-30 NOTE — PLAN OF CARE
Assumed patient care 0730  Patient alert and oriented X4  On room air, BP elevated within parameters, NSR/SB on tele  Patient c/o HA pain managed with PRN Norco, Flexeril, and Lidocaine patch to back of neck   Given PRN Zofran for c/o nausea   Patient is up with standby assist to bathroom, voiding, 1 BM this shift  Tolerating diet   PT/OT recommending outpatient PT  Await neuro recommendation  Patient updated on plan of care     Problem: NEUROLOGICAL - ADULT  Goal: Achieves stable or improved neurological status  Description: INTERVENTIONS  - Assess for and report changes in neurological status  - Initiate measures to prevent increased intracranial pressure  - Maintain blood pressure and fluid volume within ordered parameters to optimize cerebral perfusion and minimize risk of hemorrhage  - Monitor temperature, glucose, and sodium. Initiate appropriate interventions as ordered  Outcome: Progressing     Problem: CARDIOVASCULAR - ADULT  Goal: Absence of cardiac arrhythmias or at baseline  Description: INTERVENTIONS:  - Continuous cardiac monitoring, monitor vital signs, obtain 12 lead EKG if indicated  - Evaluate effectiveness of antiarrhythmic and heart rate control medications as ordered  - Initiate emergency measures for life threatening arrhythmias  - Monitor electrolytes and administer replacement therapy as ordered  Outcome: Progressing     Problem: PAIN - ADULT  Goal: Verbalizes/displays adequate comfort level or patient's stated pain goal  Description: INTERVENTIONS:  - Encourage pt to monitor pain and request assistance  - Assess pain using appropriate pain scale  - Administer analgesics based on type and severity of pain and evaluate response  - Implement non-pharmacological measures as appropriate and evaluate response  - Consider cultural and social influences on pain and pain management  - Manage/alleviate anxiety  - Utilize distraction and/or relaxation techniques  - Monitor for opioid side effects  -  Notify MD/LIP if interventions unsuccessful or patient reports new pain  - Anticipate increased pain with activity and pre-medicate as appropriate  Outcome: Progressing

## 2024-12-01 VITALS
HEIGHT: 63 IN | OXYGEN SATURATION: 95 % | WEIGHT: 164.5 LBS | BODY MASS INDEX: 29.15 KG/M2 | SYSTOLIC BLOOD PRESSURE: 174 MMHG | HEART RATE: 65 BPM | TEMPERATURE: 98 F | DIASTOLIC BLOOD PRESSURE: 76 MMHG | RESPIRATION RATE: 18 BRPM

## 2024-12-01 LAB
ANION GAP SERPL CALC-SCNC: 4 MMOL/L (ref 0–18)
BUN BLD-MCNC: 8 MG/DL (ref 9–23)
CALCIUM BLD-MCNC: 9.7 MG/DL (ref 8.7–10.4)
CHLORIDE SERPL-SCNC: 108 MMOL/L (ref 98–112)
CO2 SERPL-SCNC: 29 MMOL/L (ref 21–32)
CREAT BLD-MCNC: 0.7 MG/DL
EGFRCR SERPLBLD CKD-EPI 2021: 100 ML/MIN/1.73M2 (ref 60–?)
ERYTHROCYTE [DISTWIDTH] IN BLOOD BY AUTOMATED COUNT: 16 %
GLUCOSE BLD-MCNC: 107 MG/DL (ref 70–99)
GLUCOSE BLD-MCNC: 89 MG/DL (ref 70–99)
GLUCOSE BLD-MCNC: 94 MG/DL (ref 70–99)
HCT VFR BLD AUTO: 46.4 %
HGB BLD-MCNC: 15.1 G/DL
MAGNESIUM SERPL-MCNC: 1.9 MG/DL (ref 1.6–2.6)
MCH RBC QN AUTO: 29.4 PG (ref 26–34)
MCHC RBC AUTO-ENTMCNC: 32.5 G/DL (ref 31–37)
MCV RBC AUTO: 90.3 FL
OSMOLALITY SERPL CALC.SUM OF ELEC: 290 MOSM/KG (ref 275–295)
PLATELET # BLD AUTO: 226 10(3)UL (ref 150–450)
POTASSIUM SERPL-SCNC: 4 MMOL/L (ref 3.5–5.1)
POTASSIUM SERPL-SCNC: 4 MMOL/L (ref 3.5–5.1)
RBC # BLD AUTO: 5.14 X10(6)UL
SODIUM SERPL-SCNC: 141 MMOL/L (ref 136–145)
WBC # BLD AUTO: 4.5 X10(3) UL (ref 4–11)

## 2024-12-01 PROCEDURE — 99233 SBSQ HOSP IP/OBS HIGH 50: CPT | Performed by: OTHER

## 2024-12-01 RX ORDER — HYDRALAZINE HYDROCHLORIDE 25 MG/1
25 TABLET, FILM COATED ORAL EVERY 8 HOURS SCHEDULED
Qty: 30 TABLET | Refills: 0 | Status: SHIPPED | OUTPATIENT
Start: 2024-12-01

## 2024-12-01 RX ORDER — CYCLOBENZAPRINE HCL 10 MG
10 TABLET ORAL 2 TIMES DAILY PRN
Qty: 20 TABLET | Refills: 0 | Status: SHIPPED | OUTPATIENT
Start: 2024-12-01

## 2024-12-01 RX ORDER — MELATONIN
100 DAILY
Qty: 30 TABLET | Refills: 0 | Status: SHIPPED | OUTPATIENT
Start: 2024-12-02

## 2024-12-01 RX ORDER — HYDROCHLOROTHIAZIDE 12.5 MG/1
12.5 TABLET ORAL DAILY
Status: DISCONTINUED | OUTPATIENT
Start: 2024-12-01 | End: 2024-12-01

## 2024-12-01 RX ORDER — HYDRALAZINE HYDROCHLORIDE 25 MG/1
25 TABLET, FILM COATED ORAL EVERY 8 HOURS SCHEDULED
Status: DISCONTINUED | OUTPATIENT
Start: 2024-12-01 | End: 2024-12-01

## 2024-12-01 NOTE — PLAN OF CARE
PT A/O, 96% ON RA, SR/SB, UP VOIDING IN BATHROOM, RATES PAIN TO FOREHEAD/BACK OF NECK AT 5, GIVEN NORCO/FLEXERIL, STATES LT SIDE SLIGHTLY WEAKER, ISOLATION FOR HX OF CDIFF, NO LOOSE STOOL, LABS IN AM, INSTRUCTED PT ON POC    Problem: NEUROLOGICAL - ADULT  Goal: Achieves stable or improved neurological status  Description: INTERVENTIONS  - Assess for and report changes in neurological status  - Initiate measures to prevent increased intracranial pressure  - Maintain blood pressure and fluid volume within ordered parameters to optimize cerebral perfusion and minimize risk of hemorrhage  - Monitor temperature, glucose, and sodium. Initiate appropriate interventions as ordered  Outcome: Progressing     Problem: PAIN - ADULT  Goal: Verbalizes/displays adequate comfort level or patient's stated pain goal  Description: INTERVENTIONS:  - Encourage pt to monitor pain and request assistance  - Assess pain using appropriate pain scale  - Administer analgesics based on type and severity of pain and evaluate response  - Implement non-pharmacological measures as appropriate and evaluate response  - Consider cultural and social influences on pain and pain management  - Manage/alleviate anxiety  - Utilize distraction and/or relaxation techniques  - Monitor for opioid side effects  - Notify MD/LIP if interventions unsuccessful or patient reports new pain  - Anticipate increased pain with activity and pre-medicate as appropriate  Outcome: Progressing

## 2024-12-01 NOTE — PLAN OF CARE
Assumed patient care 0730  Patient alert and oriented X4  On room air, BP elevated within parameters, NSR/SB on tele  Patient c/o HA pain managed with PRN Norco, Flexeril, and Lidocaine patch to back of neck   Patient is up with standby assist to bathroom, voiding, 1 BM this shift  Tolerating diet   PT/OT recommending outpatient PT  Plan for outpatient follow up with Neuro Sx and repeat MRI  Patient cleared by all consults for discharge   Patient updated on plan of care        NURSING DISCHARGE NOTE    All discharge documentation including AVS, follow ups, and medications reviewed with patient, verbalized understanding.  Signs and symptoms  when to call 911 discussed with patient, verbalized understanding.   Discharged Home via Wheelchair.  Accompanied by RN  Belongings Taken by patient/family.    Problem: NEUROLOGICAL - ADULT  Goal: Achieves stable or improved neurological status  Description: INTERVENTIONS  - Assess for and report changes in neurological status  - Initiate measures to prevent increased intracranial pressure  - Maintain blood pressure and fluid volume within ordered parameters to optimize cerebral perfusion and minimize risk of hemorrhage  - Monitor temperature, glucose, and sodium. Initiate appropriate interventions as ordered  Outcome: Progressing     Problem: CARDIOVASCULAR - ADULT  Goal: Absence of cardiac arrhythmias or at baseline  Description: INTERVENTIONS:  - Continuous cardiac monitoring, monitor vital signs, obtain 12 lead EKG if indicated  - Evaluate effectiveness of antiarrhythmic and heart rate control medications as ordered  - Initiate emergency measures for life threatening arrhythmias  - Monitor electrolytes and administer replacement therapy as ordered  Outcome: Progressing     Problem: PAIN - ADULT  Goal: Verbalizes/displays adequate comfort level or patient's stated pain goal  Description: INTERVENTIONS:  - Encourage pt to monitor pain and request assistance  - Assess pain using  appropriate pain scale  - Administer analgesics based on type and severity of pain and evaluate response  - Implement non-pharmacological measures as appropriate and evaluate response  - Consider cultural and social influences on pain and pain management  - Manage/alleviate anxiety  - Utilize distraction and/or relaxation techniques  - Monitor for opioid side effects  - Notify MD/LIP if interventions unsuccessful or patient reports new pain  - Anticipate increased pain with activity and pre-medicate as appropriate  Outcome: Progressing

## 2024-12-01 NOTE — PROGRESS NOTES
History of Presenting Illness:  Patient seen for follow-up visit.  Denies any new weakness, numbness, paresthesias, confusion, disorientation or loss of consciousness.  Left upper extremity and lower extremity strength is significantly improved.  No seizure-like activity.    Vitals:   Vitals:    12/01/24 1200   BP: (!) 174/76   Pulse: 65   Resp: 18   Temp: 98.3 °F (36.8 °C)      Examination:    Awake , alert, non-dysarthric, expressive an receptive language normal.   Pupils round and reactive to light, extra ocular movement normal, no facial weakness, hearing normal.  Mild decrease in strength in the left lower extremity and downward drift in left upper extremity noted.  Right side was normal.    Finger to Nose testing normal.     Investigations:      Lab Results   Component Value Date    A1C 5.4 04/06/2022        Lab Results   Component Value Date    LDL 80 04/06/2022    HDL 36 (L) 04/06/2022    TRIG 89.00 04/06/2022        Recent Labs   Lab 11/28/24  1504 11/29/24 0627 11/30/24  0555 12/01/24  0527   RBC 5.12 5.18 4.90 5.14   HGB 14.9 15.2 14.4 15.1   HCT 44.2 45.3 43.5 46.4   MCV 86.3 87.5 88.8 90.3   MCH 29.1 29.3 29.4 29.4   MCHC 33.7 33.6 33.1 32.5   RDW 16.3 16.2 16.2 16.0   NEPRELIM 4.84 2.93  --   --    WBC 6.9 5.0 4.3 4.5   .0 210.0 209.0 226.0       Recent Labs   Lab 11/29/24 0627 11/30/24  0555 12/01/24  0527   GLU 95 92 94   BUN 10 11 8*   CREATSERUM 0.69 0.64 0.70   EGFRCR 100 102 100   CA 9.5 9.5 9.7    141 141   K 3.6 3.3* 4.0  4.0    105 108   CO2 30.0 30.0 29.0       Impression/MDM.    Left upper and lower extremity weakness, progressive over the last 1 week, lower extremity more than upper extremity, with interval improvement in the last 24 hours, without pain.  Findings less likely to be due to TIA/CVA.  Patient had concerns regarding the findings due to patient's CML medication. hematologist/oncologist does not believe the weakness or the MRI findings are related to CML  medication or progression of disease.  Abnormal MRI of the brain reporting atypical T2 hyperintensity symmetrical bilaterally and in the periaqueductal region, concerning for possible thiamine deficiency/other etiologies.  Will start patient on thiamine supplementation.   Recommend repeat MRI of the brain in 4 weeks and follow-up with neurology.  TIA.  There was concern that patient's episode may have been related to acute stroke/TIA.  CT scan of the brain and CTA of head and neck did not show any acute intracranial abnormality/LVO/stenosis.  Echocardiogram did not show intracardiac thrombi/shunting.  LDL was 60.  The progression of symptoms seems less likely to be due to TIA/CVA.  Patient already on baby aspirin and statin.  Prophylaxis.  Case discussed with patient and her  in detail.  All questions answered.  Nursing staff placed.  No further neurorecommendations.  Please feel free to call for further queries.      Time spent 35 minutes.  Greater than 50% time spent coordinating care and counseling.

## 2024-12-01 NOTE — PROGRESS NOTES
CC: follow-up hospital admission L sided weakness    SUBJECTIVE:  Interval History:     Overall feels better but still not back to baseline. Feels foggy  Would like to see neuro  Dw RN    OBJECTIVE:  Scheduled Meds:    lidocaine-menthol  1 patch Transdermal Daily    thiamine  100 mg Oral Daily    famotidine  20 mg Oral BID    aspirin  81 mg Oral Daily    atorvastatin  80 mg Oral Nightly    carvedilol  3.125 mg Oral BID with meals    FLUoxetine HCl  20 mg Oral Daily    lisinopril  20 mg Oral BID    enoxaparin  40 mg Subcutaneous Daily     Continuous Infusions:   PRN Meds:   cyclobenzaprine    calcium carbonate    calcium carbonate    clonazePAM    HYDROcodone-acetaminophen    melatonin    ondansetron    PHYSICAL EXAM  Vital signs: Temp:  [97.6 °F (36.4 °C)-98.5 °F (36.9 °C)] 98.3 °F (36.8 °C)  Pulse:  [48-71] 65  Resp:  [14-53] 18  BP: (148-174)/(60-81) 174/76  SpO2:  [92 %-95 %] 95 %      GENERAL - NAD, AAO  EYES- sclera anicteric,   HENT- normocephalic, OP - MMM  NECK - supple  CV- RRR  RESP - CTAB   ABDOMEN- soft, NT/ND   EXT- no LE edema,        Data Review:   Labs:   Recent Labs   Lab 11/28/24  1504 11/29/24  0627 11/30/24  0555 12/01/24  0527   WBC 6.9 5.0 4.3 4.5   HGB 14.9 15.2 14.4 15.1   MCV 86.3 87.5 88.8 90.3   .0 210.0 209.0 226.0       Recent Labs   Lab 11/28/24  1504 11/29/24  0627 11/30/24  0555 12/01/24  0527    143 141 141   K 3.5 3.6 3.3* 4.0  4.0    106 105 108   CO2 28.0 30.0 30.0 29.0   BUN 10 10 11 8*   CREATSERUM 0.73 0.69 0.64 0.70   CA 9.5 9.5 9.5 9.7   MG  --   --  2.0 1.9   GLU 93 95 92 94       Recent Labs   Lab 11/28/24  1504   ALT 25   AST 30   ALB 4.6       Recent Labs   Lab 11/30/24  1221 11/30/24  1636 11/30/24  2007 12/01/24  0513 12/01/24  1138   PGLU 105* 78 98 89 107*           ASSESSMENT/PLAN:    Patient is a 59 year old female with PMH sig for CAD with prior CABG, stent, HTN, HLD, CVA with left sided weakness, CML on immunotherpahy here for left sided  numbness / weakness, headaches     Impression     -Left sided numbness  -CML     -cva with left sided weakness 2022     -CAD with hx of CABG x 4 2007, hx of stent  -HTN  -HLD     -DM 2 - per chart but gluc controlled and not on meds  -ABNER     Plan     *left sided weakness / headaches / numbness  -unclear etiology of symptoms, could be related to prior stroke and exacerbation of symptoms  Mri head neg fro acute stroke  MRI c spine with C 5-6 osteophyte formation, she doesn't have R sided symptoms, would not explain her left sided symptoms.  Could cause her neck pain / headache. Will add flexeril, lido patch. Outpt nsg eval. Overall better  Check b12 level - ok   -neuro / onc consulted     *CAD  -on aspirin  -tele  -reports intermittent chest pains. Will check trop and repeat echo. Some of the symptoms appear msk - echo unremarkable     *HTN  -coreg, lisinopril      *HLD  -statin     Scds  Hearin       Dispo - pending neuro eval and pt ot    Will continue to follow while hospitalized. Please page me or the on-call hospitalist with questions or concerns.    Johnathon Dang Hospitalist  [phone]  Answering Service: [phone]

## 2024-12-01 NOTE — DISCHARGE SUMMARY
[facility] Discharge Summary    Patient ID  Dyana Souza  [id number]  59 year old  5/18/1965    Admit date: 11/28/2024    Discharge date: 12/01/24    Attending: Johnathon Fletcher DO     Primary Care Physician: Mag Jeffers MD      Reason for admission: left sided weakness    Discharge condition: stable    Disposition: home    Important follow up:  -PCP within 7 d  -specialists:      -labs:    -radiology:      Additional patient instructions       Discharge med list     Medication List        START taking these medications      cyclobenzaprine 10 MG Tabs  Commonly known as: Flexeril  Take 1 tablet (10 mg total) by mouth 2 (two) times daily as needed for Muscle spasms.     thiamine 100 MG Tabs  Commonly known as: Vitamin B1  Take 1 tablet (100 mg total) by mouth daily.  Start taking on: December 2, 2024            CHANGE how you take these medications      Potassium Chloride ER 20 MEQ Tbcr  TAKE 1 TABLET BY MOUTH DAILY  What changed:   how much to take  when to take this  reasons to take this            CONTINUE taking these medications      aspirin 81 MG Tbec     atorvastatin 80 MG Tabs  Commonly known as: Lipitor     carvedilol 3.125 MG Tabs  Commonly known as: Coreg     clonazePAM 1 MG Tabs  Commonly known as: KlonoPIN  Take 1 tablet (1 mg total) by mouth 2 (two) times daily.     famotidine 20 MG Tabs  Commonly known as: Pepcid     FLUoxetine HCl 20 MG Tabs  Commonly known as: PROZAC     HYDROcodone-acetaminophen 5-325 MG Tabs  Commonly known as: Norco  Take 1-2 tablets by mouth 3 (three) times daily as needed.     lisinopril 20 MG Tabs  Commonly known as: Prinivil; Zestril  Take 1 tablet (20 mg total) by mouth 2 (two) times daily.     Mometasone Furoate 0.1 % Crea  Commonly known as: ELOCON  Apply 1 Application topically daily.     NON FORMULARY     ondansetron 8 MG tablet  Commonly known as: Zofran  Take 1 tablet (8 mg total) by mouth every 8 (eight) hours as needed for Nausea.            STOP taking  these medications      Bosutinib 100 MG Tabs     zolpidem 10 MG Tabs  Commonly known as: Ambien               Where to Get Your Medications        These medications were sent to [id number] - [place], [place] - [address] AT [facility], [phone], [phone]  [address], [place] [address]      Phone: [phone]   cyclobenzaprine 10 MG Tabs  thiamine 100 MG Tabs         Discharge Diagnoses:  -Left sided numbness  -CML     -cva with left sided weakness      -CAD with hx of CABG x 4 , hx of stent  -HTN  -HLD     -DM 2 - per chart but gluc controlled and not on meds  -ABNER    Consults:  IP CONSULT TO NEUROLOGY  IP CONSULT TO HOSPITALIST  IP CONSULT TO SOCIAL WORK  NURSING CONSULT TO DIETITIAN  IP CONSULT TO ONCOLOGY    Radiology:  CARD ECHO 2D DOPPLER (CPT=93306)    Result Date: 2024  Transthoracic Echocardiogram Name:Dyana Souza Date: 2024 :  1965 Ht:  (63in)  BP: 162 / 93 MRN:  [id number]    Age:  59years    Wt:  (164lb) HR: 62bpm Loc:  [facility]       Gndr: F          BSA: 1.78m^2 Sonographer: Mercy DIXON Ordering:    Johnathon Fletcher Consulting:  Brennan White ---------------------------------------------------------------------------- History/Indications:   Chest pain.  Murmur. Stent~.  Coronary artery bypass grafting.    ~. ---------------------------------------------------------------------------- Procedure information:  A transthoracic complete 2D study was performed. Additional evaluation included M-mode, complete spectral Doppler, and color Doppler.  Patient status:  Inpatient.  Location:  [place] [address].    This was a routine study. Transthoracic echocardiography for ventricular function evaluation and assessment of valvular function. Image quality was adequate. ECG rhythm:   Normal sinus ---------------------------------------------------------------------------- Conclusions: 1. Left ventricle: The cavity size was normal. Wall thickness was normal.     Systolic function was normal. The estimated ejection fraction was 60-65%,    by visual assessment. No diagnostic evidence for regional wall motion    abnormalities. Doppler parameters are consistent with abnormal left    ventricular relaxation - grade 1 diastolic dysfunction. 2. Right ventricle: Systolic function was normal. 3. Left atrium: The left atrial volume was normal. 4. Pericardium, extracardiac: There was no pericardial effusion. Impressions:  No previous study from [facility] was available for comparison. * ---------------------------------------------------------------------------- * Findings: Left ventricle:  The cavity size was normal. Wall thickness was normal. Systolic function was normal. The estimated ejection fraction was 60-65%, by visual assessment. No diagnostic evidence for regional wall motion abnormalities. Doppler parameters are consistent with abnormal left ventricular relaxation - grade 1 diastolic dysfunction. Left atrium:  The left atrial volume was normal. Right ventricle:  The cavity size was normal. Systolic function was normal. Right atrium:  The atrium was normal in size. Mitral valve:  The valve was structurally normal. Leaflet separation was normal.  Doppler:  Transvalvular velocity was within the normal range. There was no evidence for stenosis. There was trivial regurgitation. Aortic valve:  The valve was structurally normal. The valve was trileaflet. Cusp separation was normal.  Doppler:  Transvalvular velocity was within the normal range. There was no evidence for stenosis. There was no significant regurgitation. Tricuspid valve:  The valve is structurally normal. Leaflet separation was normal.  Doppler:  Transvalvular velocity was within the normal range. There was no evidence for stenosis. There was trivial regurgitation. Pulmonic valve:   The valve is structurally normal. Cusp separation was normal.  Doppler:  Transvalvular velocity was within the normal  range. There was no evidence for stenosis. There was no significant regurgitation. Pericardium:   There was no pericardial effusion. Aorta: Aortic root: The aortic root was normal. Pulmonary arteries: The main pulmonary artery was normal-sized. Systemic veins:  Central venous respirophasic diameter changes are in the normal range (>50%). Inferior vena cava: The IVC was normal-sized. ---------------------------------------------------------------------------- Measurements  Left ventricle                  Value        Ref  IVS thickness, ED, PLAX         0.8   cm     0.6 - 0.9  LV ID, ED, PLAX                 3.9   cm     3.8 - 5.2  LV ID, ES, PLAX                 2.3   cm     2.2 - 3.5  LV PW thickness, ED, PLAX       0.8   cm     0.6 - 0.9  IVS/LV PW ratio, ED, PLAX       0.97         ---------  LV PW/LV ID ratio, ED, PLAX     0.21         ---------  LV ejection fraction            72    %      54 - 74  LV e', lateral              (L) 8.4   cm/sec >=10.0  LV E/e', lateral                9            <=13  LV e', medial               (L) 3.9   cm/sec >=7.0  LV E/e', medial                 20           ---------  LV e', average                  6.2   cm/sec ---------  LV E/e', average                13           <=14  Aortic root                     Value        Ref  Aortic root ID, ED              3.0   cm     2.5 - 4.0  Left atrium                     Value        Ref  LA ID, A-P, ES                  3.3   cm     2.7 - 3.8  LA volume, S                    42    ml     22 - 52  LA volume/bsa, S                24    ml/m^2 16 - 34  LA volume, ES, 1-p A4C          38    ml     22 - 52  LA volume, ES, 1-p A2C          47    ml     22 - 52  LA volume, ES, A/L              44    ml     ---------  LA volume/bsa, ES, A/L          25    ml/m^2 16 - 34  LA/aortic root ratio            1.1          ---------  Mitral valve                    Value        Ref  Mitral E-wave peak velocity     0.79  m/sec  ---------  Mitral A-wave  peak velocity     0.94  m/sec  ---------  Mitral peak gradient, D         2     mm Hg  ---------  Mitral E/A ratio, peak          0.8          ---------  Right ventricle                 Value        Ref  TAPSE, 2D                       2.00  cm     >=1.70  RV s', lateral                  10.2  cm/sec >=9.5 Legend: (L)  and  (H)  kayla values outside specified reference range. ---------------------------------------------------------------------------- Prepared and electronically signed by Efren Sanchez MD 11/30/2024 01:17     MRI THORACIC+LUMBAR SPINE (ALL W+WO) (CPT=72157/10472)    Result Date: 11/29/2024  PROCEDURE:  MRI THORACIC+LUMBAR SPINE (ALL W+WO) (CPT=72157/19620)  COMPARISON:  None.  INDICATIONS:  Left-sided weakness  TECHNIQUE:  The thoracic and lumbar spine were imaged.  A comprehensive examination was performed utilizing a variety of imaging planes and imaging parameters to optimize visualization of suspected pathology.  Images were performed before and after the administration of intravenous gadolinium contrast.  PATIENT STATED HISTORY:(As transcribed by Technologist)  Patient states left side weakness.   CONTRAST USED:  15 mL of Dotarem  FINDINGS:  THORACIC DISC LEVELS CORD:  Normal caliber, contour, and signal.  The conus terminates at a normal level.  No abnormal contrast enhancement.  BONY STRUCTURES:  Normal alignment without significant spondylosis or scoliosis.  DISCS:  Minimal disc bulge at T7-T8 is minimally effacing the cord without significant spinal canal stenosis. PARASPINAL AREA:  Unremarkable paraspinous region with no visible mass.   LUMBAR DISC LEVELS FINDINGS: Alignment of the lumbar spine is normal. Vertebral body heights are maintained throughout the lumbar spine. Mild loss of L5-S1 disc space. Marrow signal is unremarkable. The conus is at T12/L1. The visualized portion of the spinal cord is of normal caliber without focal signal abnormality. T12-L1: No disc herniation, spinal canal  or neuroforaminal stenosis. L1-L2: No disc herniation or spinal canal or neuroforaminal stenosis. L2-L3: No disc herniation, spinal canal or neuroforaminal stenosis. L3-L4: No disc herniation, spinal canal or neuroforaminal stenosis. L4-L5:  Minimal disc bulge without spinal canal or neural foraminal stenosis. L5-S1:  Central disc herniation superimposed on a broad-based disc bulge is effacing right lateral recess.  No spinal canal stenosis.  Mild bilateral neural foraminal stenosis.             CONCLUSION:   1. No signal abnormality in the thoracic spinal cord.  No significant spinal canal or neural foraminal stenosis in the thoracic spine.  No enhancing lesions in the thoracic spine.  2. No high-grade spinal canal or neural foraminal stenosis in the lumbar spine.  No enhancing lesions are identified.    LOCATION:  [id number]    Dictated by (CST): Dima Arana MD on 11/29/2024 at 3:08 PM     Finalized by (CST): Dima Arana MD on 11/29/2024 at 3:21 PM       MRI SPINE CERVICAL (W+WO) (CPT=72156)    Result Date: 11/29/2024  PROCEDURE:  MRI SPINE CERVICAL (W+WO) (CPT=72156)  COMPARISON:  None.  INDICATIONS:  Left-sided weakness, history of CLL  TECHNIQUE:  Multiplanar T1 and T2 weighted images including fat suppression sequences.  Images acquired in sagittal and axial planes.  Intravenous gadolinium was administered followed by multiplanar post-infusion T1 weighted sequences.   PATIENT STATED HISTORY:(As transcribed by Technologist)  Patient states left side weakness.   CONTRAST USED:  15 mL of Dotarem  FINDINGS:   Mild reversal the normal cervical lordosis.  Vertebral body heights and disc spaces are maintained cervical spine.  Marrow signal is unremarkable.    CERVICAL DISC LEVELS: C2-C3:  No significant disc/facet abnormality, spinal stenosis, or foraminal narrowing. C3-C4:  Posterior disc osteophyte complex is noted.  No spinal canal or neural foraminal stenosis. C4-C5:  Left paracentral posterior  osteophyte without spinal canal or neural foraminal stenosis. C5-C6:  Right paracentral posterior disc osteophyte complex is causing severe right neural foraminal stenosis with minimal effacement cervical cord.  Mild spinal canal stenosis. C6-C7:  Left paracentral posterior disc osteophyte complex is minimally effacing the cord.  No significant spinal canal or neural foraminal stenosis. C7-T1:  No significant disc/facet abnormality, spinal stenosis, or foraminal narrowing.    CRANIOCERVICAL AREA:  Normal foramen magnum with no Chiari malformation.  PARASPINAL AREA:  Normal with no visible mass.  BONY STRUCTURES:  No fracture, pars defect, or osseous lesion.  CORD:  No enhancing lesions.            CONCLUSION:   1. No high-grade spinal canal stenosis.  2. Right paracentral posterior disc osteophyte complex at C5-C6 is causing severe right neural foraminal stenosis.    LOCATION:  [id number]   Dictated by (CST): Dima Arana MD on 11/29/2024 at 2:29 PM     Finalized by (CST): Dima Arana MD on 11/29/2024 at 2:32 PM       MRI BRAIN (W+WO) (CPT=70553)    Result Date: 11/28/2024  PROCEDURE:  MRI BRAIN (W+WO) (CPT=70553)  COMPARISON:  None.  INDICATIONS:  Left sided weakness, h/o CLL  TECHNIQUE:  MRI of the brain was performed with multi-planar T1, T2-weighted images with FLAIR sequences and diffusion weighted images without and with infusion.  PATIENT STATED HISTORY:(As transcribed by Technologist)  Dizziness, left sided weakness, pain to back of head.  History of 2 prior strokes and leukemia.   CONTRAST USED:  22 mL of Dotarem  FINDINGS:  INTRACRANIAL:  There are small foci of FLAIR hyperintensity right basal ganglia and in the devonte.  This most likely indicates chronic small vessel ischemic change.  Bilateral T2 hyperintensity in the periaqueductal region is noted for example series 9, image 11. This appearance is not typical for small vessel ischemic change and can be associated with thiamine deficiency  (Wernicke encephalopathy).  Correlation with any clinical findings is necessary. VENTRICLES/SULCI:   Ventricles and sulci are normal in caliber.  There are no extra-axial fluid collections.  There is no midline shift. SINUSES/ORBITS:       The visualized paranasal sinuses are clear.  The orbits are unremarkable. MASTOIDS:       The mastoids are clear.            CONCLUSION:  1. There is no evidence of acute ischemia, hemorrhage or mass. 2. Atypical T2 hyperintensity noted symmetrically and bilaterally and periaqueductal region on image described above can be associated with thiamine deficiency or other moderate polyp abnormalities.  Correlation with any known clinical history is necessary. 3. There is chronic small vessel ischemic change noted.    LOCATION:  [facility]    Dictated by (CST): Vasquez Crane MD on 11/28/2024 at 5:34 PM     Finalized by (CST): Vasquez Crane MD on 11/28/2024 at 5:41 PM       CTA BRAIN + CTA CAROTIDS (CPT=70496/27154)    Result Date: 11/28/2024  PROCEDURE:  CTA BRAIN + CTA CAROTIDS (CPT=70496/78623)  COMPARISON:  None.  INDICATIONS:  Left sided weakness  TECHNIQUE:  CT angiography of the head and neck were obtained with non-ionic contrast.  3D volume rendering images were obtained by the technologist as well as the radiologist on an independent workstation.  Multiplanar 3D reformatted imaging including multiplanar MIP images were obtained.  Curved planar reformats were performed through the carotid and vertebral arteries. All measurements obtained in this exam were performed using NASCET criteria.  Dose reduction techniques were used. Dose information is transmitted to the ACR (American College of Radiology) NRDR (National Radiology Data Registry) which includes the Dose Index Registry.  PATIENT STATED HISTORY:(As transcribed by Technologist)  left sided weakness   CONTRAST USED:  75 mLcc of Isovue 370  FINDINGS:  VASCULATURE:  There is atherosclerotic disease in the intracranial cavernous  carotid arteries bilaterally without evidence of significant stenosis. VENTRICLES:  Normal for age.  No enlargement or displacement. CEREBRUM:  Areas of decreased attenuation right basal ganglia are most likely result of chronic small vessel ischemic change. CEREBELLUM:  Normal for age.  No excessive atrophy, mass, or hemorrhage, or abnormal enhancement. BRAINSTEM:  Normal for age.  No excessive atrophy, mass, or hemorrhage, or abnormal enhancement. BASAL CISTERNS:  No subarachnoid hemorrhage or effacement.  SKULL:  Negative.   LEFT INTERNAL CAROTID:  Densely calcified plaque proximal internal carotid artery causes 40% stenosis. EXTERNAL CAROTID:  No hemodynamically significant stenosis or dissection COMMON CAROTID:  Mild calcified plaque at vessel origin does not cause significant stenosis. VERTEBRAL:  No hemodynamically significant stenosis or dissection  RIGHT INTERNAL CAROTID:   Calcified plaque proximal internal carotid artery causes approximately 40% stenosis. EXTERNAL CAROTID:    No hemodynamically significant stenosis or dissection COMMON CAROTID:   No hemodynamically significant stenosis or dissection VERTEBRAL:   No hemodynamically significant stenosis or dissection  OTHER:  The visualized soft tissues of the neck are also unremarkable.              CONCLUSION:  1. Intracranial CT angiogram demonstrates no large branch occlusion or aneurysm. 2. CT angiogram of neck demonstrates calcified plaque in proximal internal carotid arteries bilaterally that causes approximately 40% stenosis bilaterally. 3. Focal decreased attenuation right basal ganglia is most likely the result of chronic small vessel ischemic change.  If there is clinical evidence of acute ischemia then further evaluation with MRI is recommended.   LOCATION:  [facility]   Dictated by (CST): Vasquez Crane MD on 11/28/2024 at 4:31 PM     Finalized by (CST): Vasquez Crane MD on 11/28/2024 at 4:36 PM       XR CHEST AP PORTABLE  (CPT=71045)    Result  Date: 11/28/2024  PROCEDURE:  XR CHEST AP PORTABLE  (CPT=71045)  TECHNIQUE:  AP chest radiograph was obtained.  COMPARISON:  None.  INDICATIONS:  ams, low na  PATIENT STATED HISTORY: (As transcribed by Technologist)  Pt to ER with complaints of not feeling good. Hx of leukemia. Reports cannot think right, tingling on left side for 34 x days. Nausea, abd bloating. Htn. Multiple complaints.    FINDINGS:  Calcified nodule right lower lung is noted.  Remainder of lungs is clear.  Heart size is within normal limits.  Mediastinum and gab are unremarkable.  There are sternal wires at the midline.  Chest wall structures are otherwise unremarkable.            CONCLUSION:  There is no evidence of active cardiopulmonary disease.   LOCATION:  [facility]      Dictated by (CST): Vasquez Crane MD on 11/28/2024 at 3:34 PM     Finalized by (CST): Vasquez Crane MD on 11/28/2024 at 3:34 PM         Operative reports:      Hospital course:      Patient is a 59 year old female with PMH sig for CAD with prior CABG, stent, HTN, HLD, CVA with left sided weakness, CML on immunotherpahy here for left sided numbness / weakness, headaches     *left sided weakness / headaches / numbness  -unclear etiology of symptoms, could be related to prior stroke and exacerbation of symptoms  Mri head neg fro acute stroke  MRI c spine with C 5-6 osteophyte formation, she doesn't have R sided symptoms, would not explain her left sided symptoms.  Could cause her neck pain / headache. Will add flexeril, lido patch. Outpt nsg eval. Overall better  Check b12 level - ok   -neuro / onc consulted  Dc on thiamine supplementation  Per neuro - repeat mRI brain in 4 weeks      *CAD  -on aspirin  -tele  -reports intermittent chest pains. Will check trop and repeat echo. Some of the symptoms appear msk - echo unremarkable     *HTN  -coreg, lisinopril   Add hydralazine as bp elevated     *HLD  -statin        Day of discharge exam:  Vitals:    12/01/24 1200   BP: (!) 174/76    Pulse: 65   Resp: 18   Temp: 98.3 °F (36.8 °C)          Total time coordinating care 32 min      Patient and/or family had opportunity to ask questions and expressed understanding and agreement with therapeutic plan as outlined         Johnathon Dang Hospitalist  [phone]  Answering Service: [phone]

## 2024-12-16 ENCOUNTER — OFFICE VISIT (OUTPATIENT)
Dept: SURGERY | Facility: CLINIC | Age: 59
End: 2024-12-16
Payer: MEDICARE

## 2024-12-16 DIAGNOSIS — I49.5 SSS (SICK SINUS SYNDROME) (HCC): ICD-10-CM

## 2024-12-16 DIAGNOSIS — F33.1 MODERATE EPISODE OF RECURRENT MAJOR DEPRESSIVE DISORDER (HCC): ICD-10-CM

## 2024-12-16 DIAGNOSIS — I70.0 ATHEROSCLEROSIS OF AORTA (HCC): ICD-10-CM

## 2024-12-16 DIAGNOSIS — Z79.899 IMMUNODEFICIENCY DUE TO DRUGS (HCC): ICD-10-CM

## 2024-12-16 DIAGNOSIS — M54.12 CERVICAL RADICULOPATHY: Primary | ICD-10-CM

## 2024-12-16 DIAGNOSIS — E11.59 TYPE 2 DIABETES MELLITUS WITH OTHER CIRCULATORY COMPLICATION, WITHOUT LONG-TERM CURRENT USE OF INSULIN (HCC): ICD-10-CM

## 2024-12-16 DIAGNOSIS — I50.9 ACC/AHA STAGE B HEART FAILURE (HCC): ICD-10-CM

## 2024-12-16 DIAGNOSIS — D84.821 IMMUNODEFICIENCY DUE TO DRUGS (HCC): ICD-10-CM

## 2024-12-16 PROCEDURE — 99203 OFFICE O/P NEW LOW 30 MIN: CPT | Performed by: NEUROLOGICAL SURGERY

## 2024-12-16 RX ORDER — MELOXICAM 7.5 MG/1
7.5 TABLET ORAL DAILY
Qty: 30 TABLET | Refills: 1 | Status: SHIPPED | OUTPATIENT
Start: 2024-12-16

## 2024-12-16 NOTE — PATIENT INSTRUCTIONS
Refill policies:    Allow 2-3 business days for refills; controlled substances may take longer.  Contact your pharmacy at least 5 days prior to running out of medication and have them send an electronic request or submit request through the “request refill” option in your [employer] account.  Refills are not addressed on weekends; covering physicians do not authorize routine medications on weekends.  No narcotics or controlled substances are refilled after noon on Fridays or by on call physicians.  By law, narcotics must be electronically prescribed.  A 30 day supply with no refills is the maximum allowed.  If your prescription is due for a refill, you may be due for a follow up appointment.  To best provide you care, patients receiving routine medications need to be seen at least once a year.  Patients receiving narcotic/controlled substance medications need to be seen at least once every 3 months.  In the event that your preferred pharmacy does not have the requested medication in stock (e.g. Backordered), it is your responsibility to find another pharmacy that has the requested medication available.  We will gladly send a new prescription to that pharmacy at your request.    Scheduling Tests:    If your physician has ordered radiology tests such as MRI or CT scans, please contact Central Scheduling at [phone] right away to schedule the test.  Once scheduled, the [facility] Centralized Referral Team will work with your insurance carrier to obtain pre-certification or prior authorization.  Depending on your insurance carrier, approval may take 3-10 days.  It is highly recommended patients assure they have received an authorization before having a test performed.  If test is done without insurance authorization, patient may be responsible for the entire amount billed.      Precertification and Prior Authorizations:  If your physician has recommended that you have a procedure or additional testing performed the [facility]  Centralized Referral Team will contact your insurance carrier to obtain pre-certification or prior authorization.    You are strongly encouraged to contact your insurance carrier to verify that your procedure/test has been approved and is a COVERED benefit.  Although the [facility] Centralized Referral Team does its due diligence, the insurance carrier gives the disclaimer that \"Although the procedure is authorized, this does not guarantee payment.\"    Ultimately the patient is responsible for payment.   Thank you for your understanding in this matter.  Paperwork Completion:  If you require FMLA or disability paperwork for your recovery, please make sure to either drop it off or have it faxed to our office at [phone]. Be sure the form has your name and date of birth on it.  The form will be faxed to our Forms Department and they will complete it for you.  There is a 25$ fee for all forms that need to be filled out.  Please be aware there is a 10-14 day turnaround time.  You will need to sign a release of information (MORGAN) form if your paperwork does not come with one.  You may call the Forms Department with any questions at [phone].  Their fax number is [phone].

## 2024-12-16 NOTE — PROGRESS NOTES
[facility]  Neurological Surgery Clinic Note    Dyana Souza  5/18/1965  [id number]  PCP: Mag Jeffers MD    REASON FOR VISIT:  Neck pain    HISTORY OF PRESENT ILLNESS:  Dyana Souza is a(n) 59 year old female neck pain and UE radiculopathy.  Patient has a history of leukemia and has undergone chemo.  She was recently in the ED with neck pain and UE numbness/tingling as well as LE symptoms.  MRI neural axis was completed.  MRI C spine shows DDD at C5-6 and C6-7 with foraminal stenosis B/L.  MRI T and L spine was wnl.  She has an appt with neurology to discuss and repeat her recent MRI brain.      Location: neck pain  Quality: UE numbness/tingling  Severity: progressive  Duration: months  Timing: any  Context: DDD from C5-7  Modifying Factors: none   Associated signs/symptoms: pain      PAST MEDICAL HISTORY:  Past Medical History:    Achalasia    Anxiety    Back pain    C. difficile colitis    Chronic fatigue syndrome    Completed stroke (HCC)    Coronary atherosclerosis of native coronary artery    Overview:  Cath by DANIEL Gregg 06/13/2007 for R/S CP with radiation to her back and jaw.  RCA stent (done in [place]) . Tubular LAD/Diag stent of 80% was treated with kissing balloon. LVEF 50%.   CABG 07/30/2007 had CABG x4 by Dr JAYLYN Maher. KIMBLE-LAD, SVG-OM, SVG-PDA and SVG-Diag. Admitted with atypical CP Jul 2015, Trop (-), ECHO LVEF  55% w/o WMA, MPI done Jan 2015 negative for ischemia.       Degenerative joint disease (DJD) of lumbar spine    Diabetes mellitus (HCC)    Diverticulitis    Edema    Essential hypertension    Fibromyalgia    GERD (gastroesophageal reflux disease)    Insomnia    Leukemia (HCC)    Muscle spasm    Obesity    Obstructive sleep apnea    Old myocardial infarction    Snoring       PAST SURGICAL HISTORY:  Past Surgical History:   Procedure Laterality Date    Appendectomy      Cabg, arterial, four+      2007    Cholecystectomy      Colonoscopy      Hysterectomy      partial, not due to  cancer    Oophorectomy Right     noncancer,       FAMILY HISTORY:  family history includes Cancer in her paternal grandfather; Stroke in her brother; lupus anticoagulant in her brother; malignant neoplasm of pancrease in her paternal grandfather.    SOCIAL HISTORY:   reports that she quit smoking about 18 years ago. Her smoking use included cigarettes. She has never used smokeless tobacco. She reports current drug use. Drug: Cannabis. She reports that she does not drink alcohol.    ALLERGIES:  Allergies[1]    MEDICATIONS:  Medications Ordered Prior to Encounter[2]    REVIEW OF SYSTEMS:  Review of Systems   All other systems reviewed and are negative.       PHYSICAL EXAMINATION:  Neurological Exam  Mental Status  Awake, alert and oriented to person, place and time.    Cranial Nerves  CN II: Visual acuity is normal. Visual fields full to confrontation.  CN III, IV, VI: Extraocular movements intact bilaterally. Normal lids and orbits bilaterally. Pupils equal round and reactive to light bilaterally.  CN V: Facial sensation is normal.  CN VII: Full and symmetric facial movement.  CN VIII: Hearing is normal.  CN IX, X: Palate elevates symmetrically. Normal gag reflex.  CN XI: Shoulder shrug strength is normal.  CN XII: Tongue midline without atrophy or fasciculations.    Motor  Normal muscle bulk throughout. No fasciculations present. Normal muscle tone. No abnormal involuntary movements. Strength is 5/5 throughout all four extremities.    Sensory  Sensation is intact to light touch, pinprick, vibration and proprioception in all four extremities.    Reflexes  Deep tendon reflexes are 2+ and symmetric in all four extremities.    Coordination    Finger-to-nose, rapid alternating movements and heel-to-shin normal bilaterally without dysmetria.    Gait  Normal casual, toe, heel and tandem gait.       IMAGING:  As above    ASSESSMENT:  Cervical radiculopathy    Plan:  Start Meloxicam  Start PT  F/U in 4-6 weeks      The plan  of care was discussed with the patient at length. All questions and concerns were addressed at this time. The patient verbalized agreement with the plan and was appreciative.     Total visit time: 30 minutes; More than 50% spent coordinating care, counseling, reviewing imaging and discussing medication therapy.     Columba Camacho DO  Neurological Surgery  [facility]         [1]   Allergies  Allergen Reactions    Amlodipine ANAPHYLAXIS    Clopidogrel WHEEZING and RASH    Metoclopramide HIVES and OTHER (SEE COMMENTS)     Cerner Allergy Text Annotation: Reglan, Cerner Reaction: hyper  Cerner Allergy Text Annotation: Reglan, Cerner Reaction: hyper      Mirtazapine Coughing, PALPITATIONS, Runny nose and SWELLING    Tramadol UNKNOWN    Aspirin ITCHING    Ciprofloxacin NAUSEA AND VOMITING and OTHER (SEE COMMENTS)     Cerner Allergy Text Annotation: ciprofloxacin, Cerner Reaction: vomit      Niacin And Related UNKNOWN     Other reaction(s): Rash    Opioid Analgesics OTHER (SEE COMMENTS), UNKNOWN and NAUSEA AND VOMITING     Per pt  Cerner Allergy Text Annotation: morphine, Cerner Reaction: chest pain      Sulfamethoxazole W/Trimethoprim NAUSEA AND VOMITING    Amoxicillin-Pot Clavulanate OTHER (SEE COMMENTS)     Upset her stomach with Augmentin.  Patient took amoxicillin without problem.    Gabapentin OTHER (SEE COMMENTS)    Sertraline UNKNOWN     Cerner Allergy Text Annotation: Zoloft      Trazodone UNKNOWN    Sulfa Antibiotics OTHER (SEE COMMENTS) and RASH     Cerner Allergy Text Annotation: sulfa drugs, Cerner Reaction: hives     [2]   Current Outpatient Medications on File Prior to Visit   Medication Sig Dispense Refill    cyclobenzaprine 10 MG Oral Tab Take 1 tablet (10 mg total) by mouth 2 (two) times daily as needed for Muscle spasms. 20 tablet 0    thiamine 100 MG Oral Tab Take 1 tablet (100 mg total) by mouth daily. 30 tablet 0    hydrALAZINE 25 MG Oral Tab Take 1 tablet (25 mg total) by mouth every 8  (eight) hours. 30 tablet 0    famotidine 20 MG Oral Tab Take 1 tablet (20 mg total) by mouth 2 (two) times daily.      ondansetron (ZOFRAN) 8 MG tablet Take 1 tablet (8 mg total) by mouth every 8 (eight) hours as needed for Nausea. 30 tablet 3    LISINOPRIL 20 MG Oral Tab Take 1 tablet (20 mg total) by mouth 2 (two) times daily. 60 tablet 0    FLUoxetine HCl 20 MG Oral Tab Take 1 tablet (20 mg total) by mouth daily.      atorvastatin 80 MG Oral Tab Take 1 tablet (80 mg total) by mouth nightly.      carvedilol 3.125 MG Oral Tab Take 1 tablet (3.125 mg total) by mouth 2 (two) times daily with meals.      aspirin 81 MG Oral Tab EC Take 1 tablet (81 mg total) by mouth daily.      NON FORMULARY Medical cannabis as needed.      HYDROcodone-acetaminophen 5-325 MG Oral Tab Take 1-2 tablets by mouth 3 (three) times daily as needed. 30 tablet 0    clonazePAM 1 MG Oral Tab Take 1 tablet (1 mg total) by mouth 2 (two) times daily. 60 tablet 1    Mometasone Furoate 0.1 % External Cream Apply 1 Application topically daily. 60 g 3    POTASSIUM CHLORIDE ER 20 MEQ Oral Tab CR TAKE 1 TABLET BY MOUTH DAILY 90 tablet 11     No current facility-administered medications on file prior to visit.

## 2024-12-16 NOTE — PROGRESS NOTES
New patient:  Reason for visit: cervical pain/ review imaging           Distribution of Pain:    states she will get headaches about every day and then pain will travel up to her fore head. States she has N/T in MARSHA arms and hands and left leg     Past Treatments for Current Pain Condition:     States she had no passed treatment on her neck       Prior diagnostic testing related to this condition:      Imaging in chart

## 2024-12-17 ENCOUNTER — PATIENT MESSAGE (OUTPATIENT)
Dept: SURGERY | Facility: CLINIC | Age: 59
End: 2024-12-17

## 2024-12-18 NOTE — TELEPHONE ENCOUNTER
Message below noted.    Refill request received.    Medication: cyclobenzaprine 10 MG Oral Tab      Date of last refill: 12/01/24 (#20/0)  [place]      Last office visit: 12/16/24  Due back to clinic per last office note:  4-6 weeks  Date next office visit scheduled:    Future Appointments   Date Time Provider Department Center   12/23/2024 12:30 PM Ryann Ordonez MD [facility]   1/27/2025  1:00 PM ALIREZA Camacho DO [facility]   1/31/2025 10:00 AM Buddy Mcmanus MD [facility]   2/10/2025  1:15 PM Ryann Ordonez MD [facility]           Last OV note recommendation:    \"ASSESSMENT:  Cervical radiculopathy     Plan:  Start Meloxicam  Start PT  F/U in 4-6 weeks\"    Routed to Provider.

## 2024-12-19 RX ORDER — CYCLOBENZAPRINE HCL 10 MG
10 TABLET ORAL 3 TIMES DAILY PRN
Qty: 30 TABLET | Refills: 0 | Status: SHIPPED | OUTPATIENT
Start: 2024-12-19

## 2024-12-20 NOTE — PROGRESS NOTES
Cancer Center Progress Note      Patient Name:  Dyana Souza  YOB: 1965  Medical Record Number:  [id number]    Date of visit:  2024    CHIEF COMPLAINT: CML    ONCOLOGY HISTORY:    -CML diagnosed at [facility]  Bosutinib started.  MMR .  Bosutinib held - for bradycardia which was attributed to Coreg.   bosutinib held for C. difficile diarrhea and hyponatremia.  Held  for hospital admission.    HPI:     59 year old that I have followed since .  CLL diag , followed at [facility].  Initial labs showed 62.49% BCR/ABL transcripts.  Started bosutinib.  MMR .  In , bosutinib was held for bradycardia which was then attributed to Coreg.  Went off bosutinib.  BCR ABL+ .  Bosutinib was restarted.  Held  when she was diag with C. difficile and had diarrhea.  Admitted  for profound hyponatremia that was corrected.  Received medication 10/24 but held again a few days back when her pet .  Then resumed .  Another admission  for left-sided weakness.  Previous history of CVA.  Noted some nonspecific changes.  Saw cardiology.  Holter monitor is planned.  Presents for evaluation.    SOCIAL HISTORY:    , homemaker, no children.    ROS:     Constitutional: no fever, chills fatigue,night sweats or weight loss  Neurologic: no headache, seizures, diplopia or weakness  Respiratory: no cough, SOB or hemoptysis  Cardiovascular: no chest pain, ankle swelling, DE LA ROSA  GI: no nausea, vomiting, diarrhea or BRBPR  Musculoskeletal: no body-ache or joint pain  Dermatologic: no alopecia, rash, pruritis  : no hematuria, dysuria or frequency  Psych: no confusion or depression   Heme: no easy bruising or bleeding     ALLERGIES:    Allergies   Allergen Reactions    Amlodipine ANAPHYLAXIS    Clopidogrel WHEEZING and RASH    Metoclopramide HIVES and OTHER (SEE COMMENTS)     Cerner Allergy Text Annotation: Lisa Maurer Reaction: hyper  Cerner Allergy Text Annotation:  Reglan, Cerner Reaction: hyper      Mirtazapine Coughing, PALPITATIONS, Runny nose and SWELLING    Tramadol UNKNOWN    Aspirin ITCHING    Ciprofloxacin NAUSEA AND VOMITING and OTHER (SEE COMMENTS)     Cerner Allergy Text Annotation: ciprofloxacin, Cerner Reaction: vomit      Niacin And Related UNKNOWN     Other reaction(s): Rash    Opioid Analgesics OTHER (SEE COMMENTS), UNKNOWN and NAUSEA AND VOMITING     Per pt  Cerner Allergy Text Annotation: morphine, Cerner Reaction: chest pain      Sulfamethoxazole W/Trimethoprim NAUSEA AND VOMITING    Amoxicillin-Pot Clavulanate OTHER (SEE COMMENTS)     Upset her stomach with Augmentin.  Patient took amoxicillin without problem.    Gabapentin OTHER (SEE COMMENTS)    Sertraline UNKNOWN     Cerner Allergy Text Annotation: Zoloft      Trazodone UNKNOWN    Sulfa Antibiotics OTHER (SEE COMMENTS) and RASH     Cerner Allergy Text Annotation: sulfa drugs, Cerner Reaction: hives         MEDICATIONS:    Current Outpatient Medications   Medication Sig Dispense Refill    carvedilol 6.25 MG Oral Tab Take 1 tablet (6.25 mg total) by mouth 2 (two) times daily.      cyclobenzaprine 10 MG Oral Tab Take 1 tablet (10 mg total) by mouth 3 (three) times daily as needed for Muscle spasms. 30 tablet 0    Meloxicam 7.5 MG Oral Tab Take 1 tablet (7.5 mg total) by mouth daily. 30 tablet 1    thiamine 100 MG Oral Tab Take 1 tablet (100 mg total) by mouth daily. 30 tablet 0    hydrALAZINE 25 MG Oral Tab Take 1 tablet (25 mg total) by mouth every 8 (eight) hours. 30 tablet 0    famotidine 20 MG Oral Tab Take 1 tablet (20 mg total) by mouth 2 (two) times daily.      ondansetron (ZOFRAN) 8 MG tablet Take 1 tablet (8 mg total) by mouth every 8 (eight) hours as needed for Nausea. 30 tablet 3    LISINOPRIL 20 MG Oral Tab Take 1 tablet (20 mg total) by mouth 2 (two) times daily. 60 tablet 0    FLUoxetine HCl 20 MG Oral Tab Take 1 tablet (20 mg total) by mouth daily.      atorvastatin 80 MG Oral Tab Take 1  tablet (80 mg total) by mouth nightly.      aspirin 81 MG Oral Tab EC Take 1 tablet (81 mg total) by mouth daily.      NON FORMULARY Medical cannabis as needed.      HYDROcodone-acetaminophen 5-325 MG Oral Tab Take 1-2 tablets by mouth 3 (three) times daily as needed. 30 tablet 0    clonazePAM 1 MG Oral Tab Take 1 tablet (1 mg total) by mouth 2 (two) times daily. 60 tablet 1    Mometasone Furoate 0.1 % External Cream Apply 1 Application topically daily. 60 g 3    POTASSIUM CHLORIDE ER 20 MEQ Oral Tab CR TAKE 1 TABLET BY MOUTH DAILY 90 tablet 11       VITALS:  Height: 160 cm (5' 2.99\") (1227)  Weight: 81.5 kg (179 lb 9.6 oz) (1227)  BSA (Calculated - sq m): 1.85 sq meters (1227)  Pulse: 77 (1227)  BP: 189/90 (1227)  Temp: 97.9 °F (36.6 °C) (1227)  Do Not Use - Resp Rate: --  SpO2: 98 % (1227)    PS:  ECO    PHYSICAL EXAM:    General: alert and oriented x 3, not in acute distress.  Accompanied by .  Ambulates with walker.  Neck: No adenopathy.  CVS: S1S2, regular  Rhythm, no murmurs.   Lungs: Clear to auscultation and percussion.  Abdomen: Soft, very mild tenderness.  Extremities:  No edema.  CNS: no focal deficit    Emotional well being: Patient's emotional well being was assessed.  No issues requiring acute psychosocial intervention were identified.     LABS:     Component 24 1417   BCR-ABL1 Source Whole Blood   BCR-ABL1 Interpretation DETECTED   Comment: DETECTED for the BCR-ABL1 p210 (e13/a2 and e14/a2) fusiontranscripts.  Results should be correlated with appropriateclinical and laboratory information as indicated.   % BCR-ABL1 0.17%   Comment: Estimated to represent % of total ABL1 (%BCR-ABL1:ABL1) based on the International Scale (IS).   Molecular Response Value 2.77       No results found for this or any previous visit (from the past 24 hours).      ASSESSMENT AND PLAN:     # CML: diag outside .  62.49 BCR/ABL transcripts in peripheral blood.   Started bosutinib, MMR 9/22.  Self discontinued bosutinib 11/23.  BCR/ABL transcripts +1/24.  Established with me 5/24.  In 5/24, she continued to show response with 0.17% BCR/ABL transcripts.  Bosutinib restarted held 8/24 for C. difficile, then started 10/24 and held again when she lost her support animal.  Then had again after she was admitted 11/24 for neurologic symptoms.      Encouraged her to resume.  If needed we can consider alternate TKI.      # Hyponatremia: Resolved.    # Elevated BP: Seeing cardiology.    # Weakness: Seeing neurology.    ORDERS PLACED:    Return for Labs, MD visit 4-6 weeks.    Janene Ordonez M.D.    [facility]    [facility]  [address] [place], [address]      [facility]  [address] [place], [address]    12/23/2024

## 2024-12-23 ENCOUNTER — OFFICE VISIT (OUTPATIENT)
Age: 59
End: 2024-12-23
Attending: INTERNAL MEDICINE
Payer: MEDICARE

## 2024-12-23 VITALS
HEIGHT: 62.99 IN | SYSTOLIC BLOOD PRESSURE: 189 MMHG | WEIGHT: 179.63 LBS | TEMPERATURE: 98 F | DIASTOLIC BLOOD PRESSURE: 90 MMHG | HEART RATE: 77 BPM | BODY MASS INDEX: 31.83 KG/M2 | OXYGEN SATURATION: 98 % | RESPIRATION RATE: 18 BRPM

## 2024-12-23 DIAGNOSIS — C92.10 CML (CHRONIC MYELOID LEUKEMIA) (HCC): Primary | ICD-10-CM

## 2024-12-23 DIAGNOSIS — T50.905D MEDICATION ADVERSE EFFECT, SUBSEQUENT ENCOUNTER: ICD-10-CM

## 2024-12-23 RX ORDER — CARVEDILOL 6.25 MG/1
6.25 TABLET ORAL 2 TIMES DAILY
COMMUNITY
Start: 2024-12-17

## 2024-12-23 NOTE — PROGRESS NOTES
Education Record    Learner:  Patient/ spouse    Disease / Diagnosis:CML    Barriers / Limitations:  None   Comments:    Method:  Discussion   Comments:    General Topics:  Plan of care reviewed   Comments:    Outcome:  Shows understanding   Comments:   Pt feeling better since hospitalization,   A bit stronger, but still with left sided weakness.   Using a walker and will be starting PT.   Has been off Bosutinib since admission about Thanksgiving day.   Cognitive difficulty. Swimming feeling in her head.   Needs assistance from .   Emotional support offered.

## 2025-01-08 ENCOUNTER — TELEPHONE (OUTPATIENT)
Dept: PHYSICAL THERAPY | Facility: HOSPITAL | Age: 60
End: 2025-01-08

## 2025-01-16 ENCOUNTER — TELEPHONE (OUTPATIENT)
Dept: PHYSICAL THERAPY | Age: 60
End: 2025-01-16

## 2025-01-17 NOTE — PROGRESS NOTES
Cancer Center Progress Note      Patient Name:  Dyana Souza  YOB: 1965  Medical Record Number:  [id number]    Date of visit:  2025    CHIEF COMPLAINT: CML    ONCOLOGY HISTORY:    -CML diagnosed at [facility]  Bosutinib started.  MMR .  Bosutinib held - for bradycardia which was attributed to Coreg.   bosutinib held for C. difficile diarrhea and hyponatremia.  Held  for hospital admission.    HPI:     59 year old that I have followed since .  CLL diag , followed at [facility].  Initial labs showed 62.49% BCR/ABL transcripts.  Started bosutinib.  MMR .  In , bosutinib was held for bradycardia which was then attributed to Coreg.  Went off bosutinib.  BCR ABL+ .  Bosutinib was restarted.  Held  when she was diag with C. difficile and had diarrhea.  Admitted  for profound hyponatremia that was corrected.  Received medication 10/24 but held again a few days back when her pet .  Then resumed .  Another admission  for left-sided weakness.  Previous history of CVA.  Noted some nonspecific changes.  Saw cardiology.  Holter monitor is planned.  Presents for evaluation.    Admitted  for chest pain and MS change. W/u neg. Bosutinib reduced to 300 mg.     SOCIAL HISTORY:    , homemaker, no children.    ROS:     Constitutional: no fever, chills fatigue,night sweats or weight loss  Neurologic: no headache, seizures, diplopia or weakness  Respiratory: no cough, SOB or hemoptysis  Cardiovascular: no chest pain, ankle swelling, DE LA ROSA  GI: no nausea, vomiting, diarrhea or BRBPR  Musculoskeletal: no body-ache or joint pain  Dermatologic: no alopecia, rash, pruritis  : no hematuria, dysuria or frequency  Psych: no confusion or depression   Heme: no easy bruising or bleeding     ALLERGIES:    Allergies   Allergen Reactions    Amlodipine ANAPHYLAXIS    Clopidogrel WHEEZING and RASH    Metoclopramide HIVES and OTHER (SEE COMMENTS)     Lisa  Allergy Text Annotation: Reglan, Cerner Reaction: hyper  Cerner Allergy Text Annotation: Reglan, Cerner Reaction: hyper      Mirtazapine Coughing, PALPITATIONS, Runny nose and SWELLING    Tramadol UNKNOWN    Aspirin ITCHING    Ciprofloxacin NAUSEA AND VOMITING and OTHER (SEE COMMENTS)     Cerner Allergy Text Annotation: ciprofloxacin, Cerner Reaction: vomit      Niacin And Related UNKNOWN     Other reaction(s): Rash    Opioid Analgesics OTHER (SEE COMMENTS), UNKNOWN and NAUSEA AND VOMITING     Per pt  Cerner Allergy Text Annotation: morphine, Cerner Reaction: chest pain      Sulfamethoxazole W/Trimethoprim NAUSEA AND VOMITING    Amoxicillin-Pot Clavulanate OTHER (SEE COMMENTS)     Upset her stomach with Augmentin.  Patient took amoxicillin without problem.    Gabapentin OTHER (SEE COMMENTS)    Sertraline UNKNOWN     Cerner Allergy Text Annotation: Zoloft      Trazodone UNKNOWN    Sulfa Antibiotics OTHER (SEE COMMENTS) and RASH     Cerner Allergy Text Annotation: sulfa drugs, Cerner Reaction: hives         MEDICATIONS:    Current Outpatient Medications   Medication Sig Dispense Refill    carvedilol 6.25 MG Oral Tab Take 1 tablet (6.25 mg total) by mouth 2 (two) times daily.      cyclobenzaprine 10 MG Oral Tab Take 1 tablet (10 mg total) by mouth 3 (three) times daily as needed for Muscle spasms. 30 tablet 0    Meloxicam 7.5 MG Oral Tab Take 1 tablet (7.5 mg total) by mouth daily. 30 tablet 1    thiamine 100 MG Oral Tab Take 1 tablet (100 mg total) by mouth daily. 30 tablet 0    hydrALAZINE 25 MG Oral Tab Take 1 tablet (25 mg total) by mouth every 8 (eight) hours. 30 tablet 0    famotidine 20 MG Oral Tab Take 1 tablet (20 mg total) by mouth 2 (two) times daily.      ondansetron (ZOFRAN) 8 MG tablet Take 1 tablet (8 mg total) by mouth every 8 (eight) hours as needed for Nausea. 30 tablet 3    LISINOPRIL 20 MG Oral Tab Take 1 tablet (20 mg total) by mouth 2 (two) times daily. 60 tablet 0    FLUoxetine HCl 20 MG Oral Tab  Take 1 tablet (20 mg total) by mouth daily.      atorvastatin 80 MG Oral Tab Take 1 tablet (80 mg total) by mouth nightly.      aspirin 81 MG Oral Tab EC Take 1 tablet (81 mg total) by mouth daily.      NON FORMULARY Medical cannabis as needed.      HYDROcodone-acetaminophen 5-325 MG Oral Tab Take 1-2 tablets by mouth 3 (three) times daily as needed. 30 tablet 0    clonazePAM 1 MG Oral Tab Take 1 tablet (1 mg total) by mouth 2 (two) times daily. 60 tablet 1    Mometasone Furoate 0.1 % External Cream Apply 1 Application topically daily. 60 g 3    POTASSIUM CHLORIDE ER 20 MEQ Oral Tab CR TAKE 1 TABLET BY MOUTH DAILY 90 tablet 11       VITALS:  Height: --  Weight: --  BSA (Calculated - sq m): --  Pulse: --  BP: --  Temp: --  Do Not Use - Resp Rate: --  SpO2: --    PS:  ECO    PHYSICAL EXAM:    General: alert and oriented x 3, not in acute distress.  Accompanied by .  Ambulates with walker.  Neck: No adenopathy.  CVS: S1S2, regular  Rhythm, no murmurs.   Lungs: Clear to auscultation and percussion.  Abdomen: Soft, very mild tenderness.  Extremities:  No edema.  CNS: no focal deficit    Emotional well being: Patient's emotional well being was assessed.  No issues requiring acute psychosocial intervention were identified.     LABS:     Component 24 1417   BCR-ABL1 Source Whole Blood   BCR-ABL1 Interpretation DETECTED   Comment: DETECTED for the BCR-ABL1 p210 (e13/a2 and e14/a2) fusiontranscripts.  Results should be correlated with appropriateclinical and laboratory information as indicated.   % BCR-ABL1 0.17%   Comment: Estimated to represent % of total ABL1 (%BCR-ABL1:ABL1) based on the International Scale (IS).   Molecular Response Value 2.77       No results found for this or any previous visit (from the past 24 hours).      ASSESSMENT AND PLAN:     # CML: diag outside .  62.49 BCR/ABL transcripts in peripheral blood.  Started bosutinib, MMR .  Self discontinued bosutinib .  BCR/ABL  transcripts +1/24.  Established with me 5/24.  In 5/24, she continued to show response with 0.17% BCR/ABL transcripts.  Bosutinib restarted held 8/24 for C. difficile, then started 10/24 and held again when she lost her support animal.  Then had again after she was admitted 11/24 for neurologic symptoms.      Encouraged her to resume.  If needed we can consider alternate TKI.      # Hyponatremia: Resolved.    # Elevated BP: Seeing cardiology.    # Weakness: Seeing neurology.    ORDERS PLACED:    No follow-ups on file.    Janene Ordonez M.D.    [facility]    [facility]  [address] [place], [address]      [facility]  [address] [place], [address]    1/20/2025

## 2025-01-20 ENCOUNTER — TELEPHONE (OUTPATIENT)
Age: 60
End: 2025-01-20

## 2025-01-20 ENCOUNTER — APPOINTMENT (OUTPATIENT)
Age: 60
End: 2025-01-20
Attending: INTERNAL MEDICINE
Payer: MEDICARE

## 2025-01-20 NOTE — TELEPHONE ENCOUNTER
Patient called to reschedule her follow-up appointment today with Dr. Ordonez due to having a virus. Patient states a medic came out to check on her and she tested negative for flu and covid. Patient is rescheduled for 1/31 in [place].    Patient wants to share with Dr. Ordonez that she saw the cardiologist and there is no irregular heartbeat noted. Patient states cardiologist told her there isn't an issue, and she doesn't need to see him again until June.

## 2025-01-20 NOTE — TELEPHONE ENCOUNTER
Sneezing/Runny Nose/Post Nasal Drip/Head Congestion/Dry Cough    Joshua reports she is sneezing, has a runny nose and post nasal drip. She has head congestion and a dry cough. She denies headache, chills, shakes, fever, chest congestion, dyspnea at rest or with exertion, generalized body aches, nausea, vomiting or diarrhea. She is eating small meals and drinking fluids. She denies feeling weak, light headed or dizzy. I encouraged her to please call the office of present to an [facility] if her symptoms gets worse. She agreed and thanked me for checking on her.

## 2025-01-22 ENCOUNTER — TELEPHONE (OUTPATIENT)
Dept: PHYSICAL THERAPY | Facility: HOSPITAL | Age: 60
End: 2025-01-22

## 2025-01-29 ENCOUNTER — APPOINTMENT (OUTPATIENT)
Dept: PHYSICAL THERAPY | Age: 60
End: 2025-01-29
Attending: NEUROLOGICAL SURGERY
Payer: MEDICARE

## 2025-01-29 NOTE — PROGRESS NOTES
Cancer Center Progress Note      Patient Name:  Dyana Souza  YOB: 1965  Medical Record Number:  [id number]    Date of visit:  2025    CHIEF COMPLAINT: CML    ONCOLOGY HISTORY:    -CML diagnosed at [facility]  Bosutinib started.  MMR .  Bosutinib held - for bradycardia which was attributed to Coreg.   bosutinib held for C. difficile diarrhea and hyponatremia.  Held  for hospital admission.    HPI:     59 year old that I have followed since .  CLL diag , followed at [facility].  Initial labs showed 62.49% BCR/ABL transcripts.  Started bosutinib.  MMR .  In , bosutinib was held for bradycardia which was then attributed to Coreg.  Went off bosutinib.  BCR ABL+ .  Bosutinib was restarted.  Held  when she was diag with C. difficile and had diarrhea.  Admitted  for profound hyponatremia that was corrected.  Received medication 10/24 but held again a few days back when her pet .  Then resumed .  Another admission  for left-sided weakness.  Previous history of CVA.      Admitted  for chest pain and MS change. W/u neg. Bosutinib reduced to 300 mg.  Tolerating it well.  Diarrhea is controlled.  Gaining weight.    SOCIAL HISTORY:    , homemaker, no children.    ROS:     Constitutional: no fever, chills fatigue,night sweats or weight loss  Neurologic: no headache, seizures, diplopia or weakness  Respiratory: no cough, SOB or hemoptysis  Cardiovascular: no chest pain, ankle swelling, DE LA ROSA  GI: no nausea, vomiting, diarrhea or BRBPR  Musculoskeletal: no body-ache or joint pain  Dermatologic: no alopecia, rash, pruritis  : no hematuria, dysuria or frequency  Psych: no confusion or depression   Heme: no easy bruising or bleeding     ALLERGIES:    Allergies   Allergen Reactions    Amlodipine ANAPHYLAXIS    Clopidogrel WHEEZING and RASH    Metoclopramide HIVES and OTHER (SEE COMMENTS)     Lisa Allergy Text Annotation: Reglan, Cerner  Reaction: hyper  Cerner Allergy Text Annotation: Reglan, Cerner Reaction: hyper      Mirtazapine Coughing, PALPITATIONS, Runny nose and SWELLING    Tramadol UNKNOWN    Aspirin ITCHING    Ciprofloxacin NAUSEA AND VOMITING and OTHER (SEE COMMENTS)     Cerner Allergy Text Annotation: ciprofloxacin, Cerner Reaction: vomit      Niacin And Related UNKNOWN     Other reaction(s): Rash    Opioid Analgesics OTHER (SEE COMMENTS), UNKNOWN and NAUSEA AND VOMITING     Per pt  Cerner Allergy Text Annotation: morphine, Cerner Reaction: chest pain      Sulfamethoxazole W/Trimethoprim NAUSEA AND VOMITING    Amoxicillin-Pot Clavulanate OTHER (SEE COMMENTS)     Upset her stomach with Augmentin.  Patient took amoxicillin without problem.    Gabapentin OTHER (SEE COMMENTS)    Sertraline UNKNOWN     Cerner Allergy Text Annotation: Zoloft      Trazodone UNKNOWN    Sulfa Antibiotics OTHER (SEE COMMENTS) and RASH     Cerner Allergy Text Annotation: sulfa drugs, Cerner Reaction: hives         MEDICATIONS:    Current Outpatient Medications   Medication Sig Dispense Refill    carvedilol 6.25 MG Oral Tab Take 1 tablet (6.25 mg total) by mouth 2 (two) times daily.      cyclobenzaprine 10 MG Oral Tab Take 1 tablet (10 mg total) by mouth 3 (three) times daily as needed for Muscle spasms. 30 tablet 0    Meloxicam 7.5 MG Oral Tab Take 1 tablet (7.5 mg total) by mouth daily. 30 tablet 1    thiamine 100 MG Oral Tab Take 1 tablet (100 mg total) by mouth daily. 30 tablet 0    hydrALAZINE 25 MG Oral Tab Take 1 tablet (25 mg total) by mouth every 8 (eight) hours. 30 tablet 0    famotidine 20 MG Oral Tab Take 1 tablet (20 mg total) by mouth 2 (two) times daily.      ondansetron (ZOFRAN) 8 MG tablet Take 1 tablet (8 mg total) by mouth every 8 (eight) hours as needed for Nausea. 30 tablet 3    LISINOPRIL 20 MG Oral Tab Take 1 tablet (20 mg total) by mouth 2 (two) times daily. 60 tablet 0    FLUoxetine HCl 20 MG Oral Tab Take 1 tablet (20 mg total) by mouth  daily.      atorvastatin 80 MG Oral Tab Take 1 tablet (80 mg total) by mouth nightly.      aspirin 81 MG Oral Tab EC Take 1 tablet (81 mg total) by mouth daily.      NON FORMULARY Medical cannabis as needed.      HYDROcodone-acetaminophen 5-325 MG Oral Tab Take 1-2 tablets by mouth 3 (three) times daily as needed. 30 tablet 0    clonazePAM 1 MG Oral Tab Take 1 tablet (1 mg total) by mouth 2 (two) times daily. 60 tablet 1    Mometasone Furoate 0.1 % External Cream Apply 1 Application topically daily. 60 g 3    POTASSIUM CHLORIDE ER 20 MEQ Oral Tab CR TAKE 1 TABLET BY MOUTH DAILY 90 tablet 11       VITALS:  Height: --  Weight: --  BSA (Calculated - sq m): --  Pulse: --  BP: --  Temp: --  Do Not Use - Resp Rate: --  SpO2: --    PS:  ECO    PHYSICAL EXAM:    General: alert and oriented x 3, not in acute distress.  Accompanied by .  Ambulates with walker.  Neck: No adenopathy.  CVS: S1S2, regular  Rhythm, no murmurs.   Lungs: Clear to auscultation and percussion.  Abdomen: Soft, very mild tenderness.  Extremities:  No edema.  CNS: no focal deficit    Emotional well being: Patient's emotional well being was assessed.  No issues requiring acute psychosocial intervention were identified.     LABS:     Component 24 1417   BCR-ABL1 Source Whole Blood   BCR-ABL1 Interpretation DETECTED   Comment: DETECTED for the BCR-ABL1 p210 (e13/a2 and e14/a2) fusiontranscripts.  Results should be correlated with appropriateclinical and laboratory information as indicated.   % BCR-ABL1 0.17%   Comment: Estimated to represent % of total ABL1 (%BCR-ABL1:ABL1) based on the International Scale (IS).   Molecular Response Value 2.77       No results found for this or any previous visit (from the past 24 hours).      ASSESSMENT AND PLAN:     # CML: diag outside .  62.49 BCR/ABL transcripts in peripheral blood.  Started bosutinib, MMR .  Self discontinued bosutinib .  BCR/ABL transcripts +.  Established with me  5/24.  In 5/24, she continued to show response with 0.17% BCR/ABL transcripts.  Bosutinib restarted held 8/24 for C. difficile, then started 10/24 and held again when she lost her support animal.  Then had again after she was admitted 11/24 for neurologic symptoms.      Now on bosutinib 300 mg daily.  Tolerating without any problems.  Will follow-up in 6 weeks and will check BCR/ABL at that time.    # Hyponatremia: Resolved.    # Elevated BP: Seeing cardiology.    # Weakness: Seeing neurology.    ORDERS PLACED:    No follow-ups on file.    Janene Ordonez M.D.    [facility]    [facility] [place]  [address], [place], [address]      1/31/2025

## 2025-01-31 ENCOUNTER — OFFICE VISIT (OUTPATIENT)
Age: 60
End: 2025-01-31
Attending: INTERNAL MEDICINE
Payer: MEDICARE

## 2025-01-31 ENCOUNTER — OFFICE VISIT (OUTPATIENT)
Dept: NEUROLOGY | Facility: CLINIC | Age: 60
End: 2025-01-31
Payer: MEDICARE

## 2025-01-31 VITALS
WEIGHT: 189 LBS | HEART RATE: 66 BPM | DIASTOLIC BLOOD PRESSURE: 93 MMHG | HEIGHT: 62.99 IN | BODY MASS INDEX: 33.49 KG/M2 | SYSTOLIC BLOOD PRESSURE: 170 MMHG | TEMPERATURE: 98 F | RESPIRATION RATE: 16 BRPM | OXYGEN SATURATION: 95 %

## 2025-01-31 VITALS
WEIGHT: 190 LBS | HEART RATE: 74 BPM | DIASTOLIC BLOOD PRESSURE: 90 MMHG | SYSTOLIC BLOOD PRESSURE: 160 MMHG | BODY MASS INDEX: 33.66 KG/M2 | OXYGEN SATURATION: 94 % | RESPIRATION RATE: 16 BRPM | HEIGHT: 63 IN

## 2025-01-31 DIAGNOSIS — D64.9 ANEMIA, UNSPECIFIED TYPE: ICD-10-CM

## 2025-01-31 DIAGNOSIS — C92.10 CML (CHRONIC MYELOID LEUKEMIA) (HCC): ICD-10-CM

## 2025-01-31 DIAGNOSIS — E87.1 HYPONATREMIA: Primary | ICD-10-CM

## 2025-01-31 DIAGNOSIS — R90.82 WHITE MATTER DISEASE: Primary | ICD-10-CM

## 2025-01-31 DIAGNOSIS — R19.7 DIARRHEA, UNSPECIFIED TYPE: ICD-10-CM

## 2025-01-31 LAB
ALBUMIN SERPL-MCNC: 4.3 G/DL (ref 3.2–4.8)
ALBUMIN/GLOB SERPL: 1.5 {RATIO} (ref 1–2)
ALP LIVER SERPL-CCNC: 92 U/L
ALT SERPL-CCNC: 18 U/L
ANION GAP SERPL CALC-SCNC: 7 MMOL/L (ref 0–18)
AST SERPL-CCNC: 26 U/L (ref ?–34)
BASOPHILS # BLD AUTO: 0.06 X10(3) UL (ref 0–0.2)
BASOPHILS NFR BLD AUTO: 0.6 %
BILIRUB SERPL-MCNC: 0.3 MG/DL (ref 0.3–1.2)
BUN BLD-MCNC: 12 MG/DL (ref 9–23)
CALCIUM BLD-MCNC: 9 MG/DL (ref 8.7–10.6)
CHLORIDE SERPL-SCNC: 105 MMOL/L (ref 98–112)
CO2 SERPL-SCNC: 28 MMOL/L (ref 21–32)
CREAT BLD-MCNC: 0.79 MG/DL
EGFRCR SERPLBLD CKD-EPI 2021: 86 ML/MIN/1.73M2 (ref 60–?)
EOSINOPHIL # BLD AUTO: 0.4 X10(3) UL (ref 0–0.7)
EOSINOPHIL NFR BLD AUTO: 3.8 %
ERYTHROCYTE [DISTWIDTH] IN BLOOD BY AUTOMATED COUNT: 15.1 %
GLOBULIN PLAS-MCNC: 2.8 G/DL (ref 2–3.5)
GLUCOSE BLD-MCNC: 103 MG/DL (ref 70–99)
HCT VFR BLD AUTO: 42.5 %
HGB BLD-MCNC: 13.9 G/DL
IMM GRANULOCYTES # BLD AUTO: 0.04 X10(3) UL (ref 0–1)
IMM GRANULOCYTES NFR BLD: 0.4 %
LYMPHOCYTES # BLD AUTO: 1.89 X10(3) UL (ref 1–4)
LYMPHOCYTES NFR BLD AUTO: 17.8 %
MCH RBC QN AUTO: 30.3 PG (ref 26–34)
MCHC RBC AUTO-ENTMCNC: 32.7 G/DL (ref 31–37)
MCV RBC AUTO: 92.6 FL
MONOCYTES # BLD AUTO: 0.84 X10(3) UL (ref 0.1–1)
MONOCYTES NFR BLD AUTO: 7.9 %
NEUTROPHILS # BLD AUTO: 7.39 X10 (3) UL (ref 1.5–7.7)
NEUTROPHILS # BLD AUTO: 7.39 X10(3) UL (ref 1.5–7.7)
NEUTROPHILS NFR BLD AUTO: 69.5 %
OSMOLALITY SERPL CALC.SUM OF ELEC: 290 MOSM/KG (ref 275–295)
PLATELET # BLD AUTO: 252 10(3)UL (ref 150–450)
POTASSIUM SERPL-SCNC: 4.4 MMOL/L (ref 3.5–5.1)
PROT SERPL-MCNC: 7.1 G/DL (ref 5.7–8.2)
RBC # BLD AUTO: 4.59 X10(6)UL
SODIUM SERPL-SCNC: 140 MMOL/L (ref 136–145)
WBC # BLD AUTO: 10.6 X10(3) UL (ref 4–11)

## 2025-01-31 RX ORDER — ZOLPIDEM TARTRATE 10 MG/1
TABLET ORAL
COMMUNITY
Start: 2025-01-22

## 2025-01-31 RX ORDER — BOSUTINIB MONOHYDRATE 100 MG/1
4 TABLET, FILM COATED ORAL DAILY
COMMUNITY
Start: 2025-01-21

## 2025-01-31 NOTE — PATIENT INSTRUCTIONS
Refill policies:    Allow 2-3 business days for refills; controlled substances may take longer.  Contact your pharmacy at least 5 days prior to running out of medication and have them send an electronic request or submit request through the “request refill” option in your [facility] account.  Refills are not addressed on weekends; covering physicians do not authorize routine medications on weekends.  No narcotics or controlled substances are refilled after noon on Fridays or by on call physicians.  By law, narcotics must be electronically prescribed.  A 30 day supply with no refills is the maximum allowed.  If your prescription is due for a refill, you may be due for a follow up appointment.  To best provide you care, patients receiving routine medications need to be seen at least once a year.  Patients receiving narcotic/controlled substance medications need to be seen at least once every 3 months.  In the event that your preferred pharmacy does not have the requested medication in stock (e.g. Backordered), it is your responsibility to find another pharmacy that has the requested medication available.  We will gladly send a new prescription to that pharmacy at your request.    Scheduling Tests:    If your physician has ordered radiology tests such as MRI or CT scans, please contact Central Scheduling at [phone] right away to schedule the test.  Once scheduled, the [facility] Centralized Referral Team will work with your insurance carrier to obtain pre-certification or prior authorization.  Depending on your insurance carrier, approval may take 3-10 days.  It is highly recommended patients assure they have received an authorization before having a test performed.  If test is done without insurance authorization, patient may be responsible for the entire amount billed.      Precertification and Prior Authorizations:  If your physician has recommended that you have a procedure or additional testing performed the [facility]  Centralized Referral Team will contact your insurance carrier to obtain pre-certification or prior authorization.    You are strongly encouraged to contact your insurance carrier to verify that your procedure/test has been approved and is a COVERED benefit.  Although the [facility] Centralized Referral Team does its due diligence, the insurance carrier gives the disclaimer that \"Although the procedure is authorized, this does not guarantee payment.\"    Ultimately the patient is responsible for payment.   Thank you for your understanding in this matter.  Paperwork Completion:  If you require FMLA or disability paperwork for your recovery, please make sure to either drop it off or have it faxed to our office at [phone]. Be sure the form has your name and date of birth on it.  The form will be faxed to our Forms Department and they will complete it for you.  There is a 25$ fee for all forms that need to be filled out.  Please be aware there is a 10-14 day turnaround time.  You will need to sign a release of information (MORGAN) form if your paperwork does not come with one.  You may call the Forms Department with any questions at [phone].  Their fax number is [phone].

## 2025-01-31 NOTE — PROGRESS NOTES
[facility] Neurology Outpatient Progress Note  Date of service: 1/31/2025    Assessment:     ICD-10-CM    1. White matter disease  R90.82 Vitamin B1 (Thiamine), Blood     XR LUMBAR PUNCTURE  (CPT=98288,51921)     Cell Count, CSF     Glucose, Cerebrospinal Fluid     Protein, Total, Csf     Oligoclonal Band Profile     B12 AND FOLATE      MS cannot be ruled out    Plan:      Procedures    Vitamin B1 (Thiamine), Blood    Cell Count, CSF    Glucose, Cerebrospinal Fluid    Protein, Total, Csf    Oligoclonal Band Profile    B12 AND FOLATE    XR LUMBAR PUNCTURE  (CPT=20900,17089)     See orders and medications filed with this encounter. The patient indicates understanding of these issues and agrees with the plan.  Discussed with patient/family regarding assessment, work up, care plan   RTC after LP  Pt should go ER for any new or worsening symptoms and contact office for above tests' results, any possible side effects from medication or other concerns.  A total of 40 minutes were spent on patient care,  more than 50% was spent counseling patient/family regarding studies' results, assessment, treatment options and care plan, 10 minutes were spent in (pre- and/or during visit) reviewing clinical data including imaging, labs and progress notes related to therapy or treatment.    Subjective:   History:  Patient here for a follow-up visit for recent hospitalization. Since discharge she has been doing self therapy, feels stronger.  She has recurrent left side weakness and was admitted to [facility], evaluated by Dr Jane.  Here to follow up and review future care plan.  She has had at least 2 similar events of left side weakness in the past 3-4 years.  Vision is sometimes on and off.  Family history is significant for stroke, autoimmune disease.  History/Other:   REVIEW OF SYSTEMS:  A 10-point system was reviewed. Pertinent positives and negatives are noted as above       Current Outpatient Medications:     zolpidem 10 MG Oral Tab,  every 28 days. FOR 21 DAYS IN, THEN REMOVE FOR 7 DAYS, Disp: , Rfl:     carvedilol 6.25 MG Oral Tab, Take 1 tablet (6.25 mg total) by mouth 2 (two) times daily., Disp: , Rfl:     cyclobenzaprine 10 MG Oral Tab, Take 1 tablet (10 mg total) by mouth 3 (three) times daily as needed for Muscle spasms., Disp: 30 tablet, Rfl: 0    Meloxicam 7.5 MG Oral Tab, Take 1 tablet (7.5 mg total) by mouth daily., Disp: 30 tablet, Rfl: 1    thiamine 100 MG Oral Tab, Take 1 tablet (100 mg total) by mouth daily., Disp: 30 tablet, Rfl: 0    hydrALAZINE 25 MG Oral Tab, Take 1 tablet (25 mg total) by mouth every 8 (eight) hours., Disp: 30 tablet, Rfl: 0    famotidine 20 MG Oral Tab, Take 1 tablet (20 mg total) by mouth 2 (two) times daily., Disp: , Rfl:     ondansetron (ZOFRAN) 8 MG tablet, Take 1 tablet (8 mg total) by mouth every 8 (eight) hours as needed for Nausea., Disp: 30 tablet, Rfl: 3    LISINOPRIL 20 MG Oral Tab, Take 1 tablet (20 mg total) by mouth 2 (two) times daily., Disp: 60 tablet, Rfl: 0    FLUoxetine HCl 20 MG Oral Tab, Take 1 tablet (20 mg total) by mouth daily., Disp: , Rfl:     atorvastatin 80 MG Oral Tab, Take 1 tablet (80 mg total) by mouth nightly., Disp: , Rfl:     aspirin 81 MG Oral Tab EC, Take 1 tablet (81 mg total) by mouth daily., Disp: , Rfl:     NON FORMULARY, Medical cannabis as needed., Disp: , Rfl:     HYDROcodone-acetaminophen 5-325 MG Oral Tab, Take 1-2 tablets by mouth 3 (three) times daily as needed., Disp: 30 tablet, Rfl: 0    clonazePAM 1 MG Oral Tab, Take 1 tablet (1 mg total) by mouth 2 (two) times daily., Disp: 60 tablet, Rfl: 1    Mometasone Furoate 0.1 % External Cream, Apply 1 Application topically daily., Disp: 60 g, Rfl: 3    POTASSIUM CHLORIDE ER 20 MEQ Oral Tab CR, TAKE 1 TABLET BY MOUTH DAILY, Disp: 90 tablet, Rfl: 11  Allergies:  Allergies[1]  Past Medical History:    Achalasia    Anxiety    Back pain    C. difficile colitis    Chronic fatigue syndrome    Completed stroke (HCC)     Coronary atherosclerosis of native coronary artery    Overview:  Cath by DANIEL Gregg 2007 for R/S CP with radiation to her back and jaw.  RCA stent (done in [place]) . Tubular LAD/Diag stent of 80% was treated with kissing balloon. LVEF 50%.   CABG 2007 had CABG x4 by Dr JAYLYN Maher. KIMBLE-LAD, SVG-OM, SVG-PDA and SVG-Diag. Admitted with atypical CP 2015, Trop (-), ECHO LVEF  55% w/o WMA, MPI done 2015 negative for ischemia.       Degenerative joint disease (DJD) of lumbar spine    Diabetes mellitus (HCC)    Diverticulitis    Edema    Essential hypertension    Fibromyalgia    GERD (gastroesophageal reflux disease)    Insomnia    Leukemia (HCC)    Muscle spasm    Obesity    Obstructive sleep apnea    Old myocardial infarction    Snoring     Past Surgical History:   Procedure Laterality Date    Appendectomy      Cabg, arterial, four+          Cholecystectomy      Colonoscopy      Hysterectomy      partial, not due to cancer    Oophorectomy Right     noncancer,     Social History:  Social History     Tobacco Use    Smoking status: Former     Current packs/day: 0.00     Types: Cigarettes     Quit date:      Years since quittin.0    Smokeless tobacco: Never   Substance Use Topics    Alcohol use: No     Comment: occ     Family History   Problem Relation Age of Onset    Cancer Paternal Grandfather         pancreatic ca    Other (malignant neoplasm of pancrease) Paternal Grandfather     Stroke Brother     Other (lupus anticoagulant) Brother       Objective:   Neurological Examination:  /90   Pulse 74   Resp 16   Ht 63\"   Wt 190 lb (86.2 kg)   LMP  (LMP Unknown)   SpO2 94%   BMI 33.66 kg/m²   Mental status: A & O X 3  Language: no aphasia  Speech: no dysarthria  CN II-XII: intact   Motor strength: 5/5 all extremities except left LE 5-/5  Tone: normal  DTRs: 2+ symmetric  Coordination: normal  Sensory: symmetric  Gait: mild weakness in left leg noted    Test reviewed on 2025      Buddy  \"Marcin\"MD Yonathan  Neurology  [facility]  1/31/2025, 10:50 AM  CC: Mag Jeffers MD       [1]   Allergies  Allergen Reactions    Amlodipine ANAPHYLAXIS    Clopidogrel WHEEZING and RASH    Metoclopramide HIVES and OTHER (SEE COMMENTS)     Cerner Allergy Text Annotation: Reglan, Cerner Reaction: hyper  Cerner Allergy Text Annotation: Reglan, Cerner Reaction: hyper      Mirtazapine Coughing, PALPITATIONS, Runny nose and SWELLING    Tramadol UNKNOWN    Ciprofloxacin NAUSEA AND VOMITING and OTHER (SEE COMMENTS)     Cerner Allergy Text Annotation: ciprofloxacin, Cerner Reaction: vomit      Niacin And Related UNKNOWN     Other reaction(s): Rash    Opioid Analgesics OTHER (SEE COMMENTS), UNKNOWN and NAUSEA AND VOMITING     Per pt  Cerner Allergy Text Annotation: morphine, Cerner Reaction: chest pain      Sulfamethoxazole W/Trimethoprim NAUSEA AND VOMITING    Amoxicillin-Pot Clavulanate OTHER (SEE COMMENTS)     Upset her stomach with Augmentin.  Patient took amoxicillin without problem.    Gabapentin OTHER (SEE COMMENTS)    Sertraline UNKNOWN     Cerner Allergy Text Annotation: Zoloft      Trazodone UNKNOWN    Sulfa Antibiotics OTHER (SEE COMMENTS) and RASH     Cerner Allergy Text Annotation: sulfa drugs, Cerner Reaction: hives

## 2025-01-31 NOTE — PROGRESS NOTES
Patient here with  today  Patient stated she had a MRI done on thanksgiving day 2024  Patient stated she had left side weakness  She stated she was informed she already had a few strokes.

## 2025-01-31 NOTE — PROGRESS NOTES
Education Record    Learner:  Patient/ spouse    Disease / Diagnosis:CML    Barriers / Limitations:  None   Comments:    Method:  Discussion   Comments:    General Topics:  Plan of care reviewed   Comments:    Outcome:  Shows understanding   Comments:   Taking Bosutinib 300mg daily,   Pt tolerating well.   Wore a cardiac halter monitor for a week and was normal.   Had a virus but is feeling better now.

## 2025-02-03 ENCOUNTER — TELEPHONE (OUTPATIENT)
Dept: NEUROLOGY | Facility: CLINIC | Age: 60
End: 2025-02-03

## 2025-02-03 NOTE — TELEPHONE ENCOUNTER
Pt is scheduled for a spinal tap on 2/13. They requested she ask Provider if she should stop taking B1 today and aspirin 5 days before the procedure. Please advise, Pt's best cb # [phone]. Endorsed to RN.

## 2025-02-03 NOTE — TELEPHONE ENCOUNTER
Patient notified Dr Mcmanus states it is ok for her to be off Aspirin for 5 days prior to procedure.

## 2025-02-04 ENCOUNTER — APPOINTMENT (OUTPATIENT)
Dept: PHYSICAL THERAPY | Age: 60
End: 2025-02-04
Attending: NEUROLOGICAL SURGERY
Payer: MEDICARE

## 2025-02-05 ENCOUNTER — TELEPHONE (OUTPATIENT)
Dept: HEMATOLOGY/ONCOLOGY | Facility: HOSPITAL | Age: 60
End: 2025-02-05

## 2025-02-05 NOTE — TELEPHONE ENCOUNTER
Returned the call, Dr iniguez said there is no need to hold the medicine prior to planned spinal tap.

## 2025-02-05 NOTE — TELEPHONE ENCOUNTER
Liudmila calling calling she's having a spinal tap on 2/13 asking if she needs to stop chemo treatment 5 days prior to this. Thank you charmaine

## 2025-02-06 ENCOUNTER — APPOINTMENT (OUTPATIENT)
Dept: PHYSICAL THERAPY | Age: 60
End: 2025-02-06
Attending: NEUROLOGICAL SURGERY
Payer: MEDICARE

## 2025-02-07 NOTE — TELEPHONE ENCOUNTER
Medication: Meloxicam 7.5 MG Oral Tab      Date of last refill: 12/16/24 (#30/1)  [place]      Last office visit: 12/16/24  Due back to clinic per last office note:  4-6 weeks  Date next office visit scheduled:    Future Appointments   Date Time Provider Department Center   2/13/2025  9:00 AM [facility]   2/13/2025 10:00 AM  XR RM1 (MISC FLUOROSCOPY) [facility] XRAY [facility]   2/14/2025  2:15 PM Елена Amaya, PT [facility]   2/17/2025  1:40 PM ALIREZA Camacho DO [facility]AP[facility]2 [facility]   3/10/2025  1:00 PM Ryann Ordonez MD PF [facility]   3/26/2025  2:00 PM Buddy Mcmanus MD ENINAPER [facility]           Last OV note recommendation:    \"ASSESSMENT:  Cervical radiculopathy     Plan:  Start Meloxicam  Start PT  F/U in 4-6 weeks\"    Routed to Provider.

## 2025-02-10 ENCOUNTER — APPOINTMENT (OUTPATIENT)
Age: 60
End: 2025-02-10
Attending: INTERNAL MEDICINE
Payer: MEDICARE

## 2025-02-10 RX ORDER — MELOXICAM 7.5 MG/1
7.5 TABLET ORAL DAILY
Qty: 30 TABLET | Refills: 1 | Status: SHIPPED | OUTPATIENT
Start: 2025-02-10

## 2025-02-12 ENCOUNTER — APPOINTMENT (OUTPATIENT)
Dept: PHYSICAL THERAPY | Age: 60
End: 2025-02-12
Attending: NEUROLOGICAL SURGERY
Payer: MEDICARE

## 2025-02-13 ENCOUNTER — NURSE ONLY (OUTPATIENT)
Dept: LAB | Facility: HOSPITAL | Age: 60
End: 2025-02-13
Attending: Other
Payer: MEDICARE

## 2025-02-13 ENCOUNTER — TELEPHONE (OUTPATIENT)
Dept: SURGERY | Facility: CLINIC | Age: 60
End: 2025-02-13

## 2025-02-13 ENCOUNTER — HOSPITAL ENCOUNTER (OUTPATIENT)
Dept: GENERAL RADIOLOGY | Facility: HOSPITAL | Age: 60
Discharge: HOME | End: 2025-02-13
Attending: Other
Payer: MEDICARE

## 2025-02-13 VITALS
BODY MASS INDEX: 33.49 KG/M2 | HEIGHT: 63 IN | OXYGEN SATURATION: 96 % | DIASTOLIC BLOOD PRESSURE: 78 MMHG | RESPIRATION RATE: 16 BRPM | HEART RATE: 75 BPM | TEMPERATURE: 99 F | WEIGHT: 189 LBS | SYSTOLIC BLOOD PRESSURE: 164 MMHG

## 2025-02-13 DIAGNOSIS — Z01.812 PRE-PROCEDURE LAB EXAM: Primary | ICD-10-CM

## 2025-02-13 DIAGNOSIS — R90.82 WHITE MATTER DISEASE: Primary | ICD-10-CM

## 2025-02-13 DIAGNOSIS — R90.82 WHITE MATTER DISEASE: ICD-10-CM

## 2025-02-13 LAB
BASOPHILS NFR CSF: 0 %
CLARITY CSF: CLEAR
COLOR CSF: COLORLESS
COUNT PERFORMED ON TUBE: 4
EOSINOPHIL NFR CSF: 0 %
GLUCOSE CSF-MCNC: 63 MG/DL (ref 40–70)
INR BLD: 0.98 (ref 0.8–1.2)
LYMPHOCYTES NFR CSF: 22 %
MONOS+MACROS NFR CSF: 5 %
NEUTROPHILS NFR CSF: 73 %
PROT PATTERN CSF ELPH-IMP: 30.7 MG/DL (ref 15–45)
PROTHROMBIN TIME: 13.1 SECONDS (ref 11.6–14.8)
RBC CSF: 2000 /MM3 (ref ?–1)
TOTAL CELLS COUNTED FLD: 100
TOTAL VOLUME CSF: 8 ML
WBC # CSF: 6 /MM3 (ref 0–5)

## 2025-02-13 PROCEDURE — 89050 BODY FLUID CELL COUNT: CPT

## 2025-02-13 PROCEDURE — 85610 PROTHROMBIN TIME: CPT

## 2025-02-13 PROCEDURE — 62328 DX LMBR SPI PNXR W/FLUOR/CT: CPT | Performed by: OTHER

## 2025-02-13 PROCEDURE — 84157 ASSAY OF PROTEIN OTHER: CPT

## 2025-02-13 PROCEDURE — 89051 BODY FLUID CELL COUNT: CPT

## 2025-02-13 PROCEDURE — 82784 ASSAY IGA/IGD/IGG/IGM EACH: CPT

## 2025-02-13 PROCEDURE — 82040 ASSAY OF SERUM ALBUMIN: CPT

## 2025-02-13 PROCEDURE — 82945 GLUCOSE OTHER FLUID: CPT

## 2025-02-13 PROCEDURE — 82042 OTHER SOURCE ALBUMIN QUAN EA: CPT

## 2025-02-13 PROCEDURE — 83916 OLIGOCLONAL BANDS: CPT

## 2025-02-13 PROCEDURE — 36415 COLL VENOUS BLD VENIPUNCTURE: CPT

## 2025-02-13 RX ORDER — NICOTINE POLACRILEX 4 MG
15 LOZENGE BUCCAL
Status: DISCONTINUED | OUTPATIENT
Start: 2025-02-13 | End: 2025-02-15

## 2025-02-13 RX ORDER — DEXTROSE MONOHYDRATE 25 G/50ML
50 INJECTION, SOLUTION INTRAVENOUS
Status: DISCONTINUED | OUTPATIENT
Start: 2025-02-13 | End: 2025-02-15

## 2025-02-13 RX ORDER — NICOTINE POLACRILEX 4 MG
30 LOZENGE BUCCAL
Status: DISCONTINUED | OUTPATIENT
Start: 2025-02-13 | End: 2025-02-15

## 2025-02-13 NOTE — TELEPHONE ENCOUNTER
Patient was scheduled for 2/17/25 f/u after PT appointment, however due to other health conditions has not been able to attend PT sessions.  Called and spoke to patient who states she will cancel this appointment and call back to reschedule once she has been able to do PT.

## 2025-02-13 NOTE — IMAGING NOTE
Dyana Souza received in [facility]. For XR Lumbar Puncture. Prepared pt for procedure. Procedure explained.

## 2025-02-13 NOTE — PROCEDURES
PROCEDURE: XR LUMBAR PUNCTURE DIAG, ING IMG (CPT=62328)    COMPARISON: [facility] , MR, MRI BRAIN (W+WO) (CPT=70553), 11/28/2024, 4:44 PM.  [facility] , MR, MRI THORACIC+LUMBAR SPINE (ALL W+WO) (CPT=72157/24250), 11/29/2024, 12:13 PM.    INDICATIONS: R90.82 White matter disease    TECHNIQUE: The risks, benefits, and alternatives of the procedure were explained to the patient and witnessed informed written and verbal consent was documented in the chart. Time out was performed by the staff. Complications including bleeding, infection, nerve root injury, and spinal headache were discussed. Signed informed consent was documented in the chart. The patient was placed in the prone position and lumbar region was prepped and draped in sterile fashion. 1% lidocaine was used for local anesthetic. Under fluoroscopic guidance, a 22 gauge spinal needle was advanced via interlaminar approach into the spinal canal. CSF was collected and submitted to pathology for further analysis. The needle was removed and hemostasis was achieved with manual compression. The patient tolerated the procedure well without immediate complication.    PATIENT STATED HISTORY: (As transcribed by Technologist) Patient states she's having cognitive issues with left-sided weakness. Testing shows white matter disease. 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     FLUOROSCOPY IMAGES OBTAINED: 3  FLUOROSCOPY TIME: 0:28 minutes  RADIATION DOSE (AIR KERMA PRODUCT): 180.4uGy/m2    FINDINGS:   LEVEL PUNCTURED: L2-3  FLUID OBTAINED: 9.75 cc of clear CSF  NEEDLE: 22 gauge spinal  ANESTHETIC: 1% lidocaine for local anesthetic  COMPLICATIONS: None.  OTHER: Negative.    CONCLUSION: Uneventful lumbar puncture.

## 2025-02-13 NOTE — DISCHARGE INSTRUCTIONS
Discharge/After Visit [facility] - Department of Radiology  Myelogram/Lumbar Puncture    Activity  After a Lumbar Puncture/Myelogram: Remain flat in bed until the morning after the procedure. You may get up to walk to the bathroom or sit up for brief periods to eat and safely swallow. Do not do any strenuous exercises or lift over 5 lbs. for 48 hours after the procedure.      After a Cervical Myelogram: Keep your head elevated 30° until the morning after the procedure - whether in a bed or a recliner.  You may get up to walk to the bathroom or sit up for brief periods to eat and safely swallow.  Do not do any strenuous exercises or lift over 5 lbs. for the next 48 hours.      Site Care  Keep a bandage on the puncture site for 24 hours after the procedure.  You may shower after 24 hours, but no soaking in a bathtub for 7 days.    Diet  Unless you are on a fluid restriction due to a medical condition, drink lots of fluid to prevent a headache after these procedures.  Resume your usual diet. A slow return to normal foods is an ideal way to minimize nausea.    Pain Management  You may develop a headache that will typically resolve on its own in 1 to 2 days. Lying down should help relieve this pain. You may use usual over-the-counter or prescription pain medications, as needed, if it is not contraindicated due to a medical condition.    Medications  You may resume your usual home medications. If you take blood thinners, you may resume them the day after your procedure. DO NOT take aspirin, Motrin, ibuprofen, or any medications containing NSAIDS (non-steroidal anti-inflammatory drugs) for 24 hours.    When to Seek Medical Advice  Call the provider that ordered this procedure with any questions and to discuss test results. Results may also be available in My Chart. You may also contact the Radiology Nurse at [phone] Monday-Friday 8-5 with any additional questions or concerns.    Dial [phone]  and ask the  to page the on-call Interventional Radiologist if any of these occur:  Experience a severe headache or severe pain.  There is a change in color or temperature of the area where the procedure was performed.  You develop increasing pain or shortness of breath.  Unusual drowsiness, weakness, or dizziness.  Unusual vomiting.   IF YOU FEEL YOU ARE EXPERIENCING AN EMERGENCY,   CALL 911 OR GO TO THE NEAREST EMERGENCY ROOM  4.2.24 MO/  Radiology

## 2025-02-14 ENCOUNTER — APPOINTMENT (OUTPATIENT)
Dept: PHYSICAL THERAPY | Age: 60
End: 2025-02-14
Attending: NEUROLOGICAL SURGERY
Payer: MEDICARE

## 2025-02-18 ENCOUNTER — PATIENT MESSAGE (OUTPATIENT)
Dept: NEUROLOGY | Facility: CLINIC | Age: 60
End: 2025-02-18

## 2025-02-18 LAB
ALBUMIN CSF: 16 MG/DL
ALBUMIN: 4.1 G/DL
CSF IGG INDEX: 0.5
CSF/SER ALB INDEX: 4
IGG CSF: 1.8 MG/DL
IGG SYNTHESIS  RATE CSF: -3.8 MG/DAY
IGG/ALB RATIO CSF: 0.11
IGG: 1016 MG/DL

## 2025-02-20 NOTE — TELEPHONE ENCOUNTER
Sorry for the confusion, in this case I recommend you to see a MS specialist (in our group Dr Carrasquillo is MS director) or a specialist in [place]. Your CSF showed no oligoclonal band, this does not support MS.

## 2025-02-20 NOTE — TELEPHONE ENCOUNTER
[employer] message sent with provider response-    Buddy Mcmanus MD       2/20/25  2:01 PM  Note      Sorry for the confusion, in this case I recommend you to see a MS specialist (in our group Dr Carrasquillo is MS director) or a specialist in [place]. Your CSF showed no oligoclonal band, this does not support MS.

## 2025-02-22 ENCOUNTER — PATIENT MESSAGE (OUTPATIENT)
Dept: NEUROLOGY | Facility: CLINIC | Age: 60
End: 2025-02-22

## 2025-03-10 ENCOUNTER — OFFICE VISIT (OUTPATIENT)
Age: 60
End: 2025-03-10
Attending: INTERNAL MEDICINE
Payer: MEDICARE

## 2025-03-10 VITALS
DIASTOLIC BLOOD PRESSURE: 101 MMHG | SYSTOLIC BLOOD PRESSURE: 169 MMHG | BODY MASS INDEX: 33.66 KG/M2 | RESPIRATION RATE: 18 BRPM | OXYGEN SATURATION: 98 % | HEIGHT: 62.99 IN | TEMPERATURE: 97 F | WEIGHT: 190 LBS | HEART RATE: 61 BPM

## 2025-03-10 DIAGNOSIS — C92.10 CML (CHRONIC MYELOID LEUKEMIA) (HCC): ICD-10-CM

## 2025-03-10 DIAGNOSIS — D64.9 ANEMIA, UNSPECIFIED TYPE: ICD-10-CM

## 2025-03-10 LAB
ALBUMIN SERPL-MCNC: 4.5 G/DL (ref 3.2–4.8)
ALBUMIN/GLOB SERPL: 1.4 {RATIO} (ref 1–2)
ALP LIVER SERPL-CCNC: 81 U/L
ALT SERPL-CCNC: 25 U/L
ANION GAP SERPL CALC-SCNC: 7 MMOL/L (ref 0–18)
AST SERPL-CCNC: 30 U/L (ref ?–34)
BASOPHILS # BLD AUTO: 0.06 X10(3) UL (ref 0–0.2)
BASOPHILS NFR BLD AUTO: 0.7 %
BILIRUB SERPL-MCNC: 0.4 MG/DL (ref 0.3–1.2)
BUN BLD-MCNC: 15 MG/DL (ref 9–23)
CALCIUM BLD-MCNC: 9.8 MG/DL (ref 8.7–10.6)
CHLORIDE SERPL-SCNC: 103 MMOL/L (ref 98–112)
CO2 SERPL-SCNC: 28 MMOL/L (ref 21–32)
CREAT BLD-MCNC: 0.8 MG/DL
EGFRCR SERPLBLD CKD-EPI 2021: 85 ML/MIN/1.73M2 (ref 60–?)
EOSINOPHIL # BLD AUTO: 0.57 X10(3) UL (ref 0–0.7)
EOSINOPHIL NFR BLD AUTO: 6.8 %
ERYTHROCYTE [DISTWIDTH] IN BLOOD BY AUTOMATED COUNT: 14.7 %
GLOBULIN PLAS-MCNC: 3.3 G/DL (ref 2–3.5)
GLUCOSE BLD-MCNC: 88 MG/DL (ref 70–99)
HCT VFR BLD AUTO: 45.8 %
HGB BLD-MCNC: 15.4 G/DL
IMM GRANULOCYTES # BLD AUTO: 0.02 X10(3) UL (ref 0–1)
IMM GRANULOCYTES NFR BLD: 0.2 %
LYMPHOCYTES # BLD AUTO: 1.66 X10(3) UL (ref 1–4)
LYMPHOCYTES NFR BLD AUTO: 19.8 %
MCH RBC QN AUTO: 30.4 PG (ref 26–34)
MCHC RBC AUTO-ENTMCNC: 33.6 G/DL (ref 31–37)
MCV RBC AUTO: 90.5 FL
MONOCYTES # BLD AUTO: 0.68 X10(3) UL (ref 0.1–1)
MONOCYTES NFR BLD AUTO: 8.1 %
NEUTROPHILS # BLD AUTO: 5.39 X10 (3) UL (ref 1.5–7.7)
NEUTROPHILS # BLD AUTO: 5.39 X10(3) UL (ref 1.5–7.7)
NEUTROPHILS NFR BLD AUTO: 64.4 %
OSMOLALITY SERPL CALC.SUM OF ELEC: 286 MOSM/KG (ref 275–295)
PLATELET # BLD AUTO: 249 10(3)UL (ref 150–450)
POTASSIUM SERPL-SCNC: 3.9 MMOL/L (ref 3.5–5.1)
PROT SERPL-MCNC: 7.8 G/DL (ref 5.7–8.2)
RBC # BLD AUTO: 5.06 X10(6)UL
SODIUM SERPL-SCNC: 138 MMOL/L (ref 136–145)
WBC # BLD AUTO: 8.4 X10(3) UL (ref 4–11)

## 2025-03-10 NOTE — PROGRESS NOTES
Cancer Center Progress Note      Patient Name:  Dyana Souza  YOB: 1965  Medical Record Number:  [id number]    Date of visit:  3/10/2025    CHIEF COMPLAINT: CML    ONCOLOGY HISTORY:    -CML diagnosed at [facility]  Bosutinib started.  MMR .  Bosutinib held - for bradycardia which was attributed to Coreg.   bosutinib held for C. difficile diarrhea and hyponatremia.  Held  for hospital admission.    HPI:     59 year old that I have followed since .  CLL diag , followed at [facility].  Initial labs showed 62.49% BCR/ABL transcripts.  Started bosutinib.  MMR .  In , bosutinib was held for bradycardia which was then attributed to Coreg.  Went off bosutinib.  BCR ABL+ .  Bosutinib was restarted.  Held  when she was diag with C. difficile and had diarrhea.  Admitted  for profound hyponatremia that was corrected.  Received medication 10/24 but held again a few days back when her pet .  Then resumed .  Another admission  for left-sided weakness.  Previous history of CVA.      Admitted  for chest pain and MS change. W/u neg. Bosutinib reduced to 300 mg.  Tolerating it well.  Diarrhea is controlled.  Gaining weight.  Some neurologic issues.  Seeing neurology.  Being worked up for MS.    SOCIAL HISTORY:    , homemaker, no children.    ROS:     Constitutional: no fever, chills fatigue,night sweats or weight loss  Neurologic: no headache, seizures, diplopia or weakness  Respiratory: no cough, SOB or hemoptysis  Cardiovascular: no chest pain, ankle swelling, DE LA ROSA  GI: no nausea, vomiting, diarrhea or BRBPR  Musculoskeletal: no body-ache or joint pain  Dermatologic: no alopecia, rash, pruritis  : no hematuria, dysuria or frequency  Psych: no confusion or depression   Heme: no easy bruising or bleeding     ALLERGIES:    Allergies   Allergen Reactions    Amlodipine ANAPHYLAXIS    Clopidogrel WHEEZING and RASH    Metoclopramide HIVES and OTHER  (SEE COMMENTS)     Cerner Allergy Text Annotation: Reglan, Cerner Reaction: hyper  Cerner Allergy Text Annotation: Reglan, Cerner Reaction: hyper      Mirtazapine Coughing, PALPITATIONS, Runny nose and SWELLING    Tramadol UNKNOWN    Ciprofloxacin NAUSEA AND VOMITING and OTHER (SEE COMMENTS)     Cerner Allergy Text Annotation: ciprofloxacin, Cerner Reaction: vomit      Niacin And Related UNKNOWN     Other reaction(s): Rash    Opioid Analgesics OTHER (SEE COMMENTS), UNKNOWN and NAUSEA AND VOMITING     Per pt  Cerner Allergy Text Annotation: morphine, Cerner Reaction: chest pain      Sulfamethoxazole W/Trimethoprim NAUSEA AND VOMITING    Amoxicillin-Pot Clavulanate OTHER (SEE COMMENTS)     Upset her stomach with Augmentin.  Patient took amoxicillin without problem.    Gabapentin OTHER (SEE COMMENTS)    Sertraline UNKNOWN     Cerner Allergy Text Annotation: Zoloft      Trazodone UNKNOWN    Sulfa Antibiotics OTHER (SEE COMMENTS) and RASH     Cerner Allergy Text Annotation: sulfa drugs, Cerner Reaction: hives         MEDICATIONS:    Current Outpatient Medications   Medication Sig Dispense Refill    MELOXICAM 7.5 MG Oral Tab TAKE 1 TABLET BY MOUTH EVERY DAY 30 tablet 1    zolpidem 10 MG Oral Tab every 28 days. FOR 21 DAYS IN, THEN REMOVE FOR 7 DAYS      BOSULIF 100 MG Oral Tab Take 4 tablets by mouth daily. Pt taking 300mg      carvedilol 6.25 MG Oral Tab Take 1 tablet (6.25 mg total) by mouth 2 (two) times daily.      cyclobenzaprine 10 MG Oral Tab Take 1 tablet (10 mg total) by mouth 3 (three) times daily as needed for Muscle spasms. 30 tablet 0    thiamine 100 MG Oral Tab Take 1 tablet (100 mg total) by mouth daily. 30 tablet 0    famotidine 20 MG Oral Tab Take 1 tablet (20 mg total) by mouth 2 (two) times daily.      ondansetron (ZOFRAN) 8 MG tablet Take 1 tablet (8 mg total) by mouth every 8 (eight) hours as needed for Nausea. 30 tablet 3    LISINOPRIL 20 MG Oral Tab Take 1 tablet (20 mg total) by mouth 2 (two) times  daily. 60 tablet 0    FLUoxetine HCl 20 MG Oral Tab Take 1 tablet (20 mg total) by mouth daily.      atorvastatin 80 MG Oral Tab Take 1 tablet (80 mg total) by mouth nightly.      aspirin 81 MG Oral Tab EC Take 1 tablet (81 mg total) by mouth daily.      NON FORMULARY Medical cannabis as needed.      HYDROcodone-acetaminophen 5-325 MG Oral Tab Take 1-2 tablets by mouth 3 (three) times daily as needed. 30 tablet 0    clonazePAM 1 MG Oral Tab Take 1 tablet (1 mg total) by mouth 2 (two) times daily. 60 tablet 1    Mometasone Furoate 0.1 % External Cream Apply 1 Application topically daily. 60 g 3    POTASSIUM CHLORIDE ER 20 MEQ Oral Tab CR TAKE 1 TABLET BY MOUTH DAILY 90 tablet 11       VITALS:  Height: 160 cm (5' 2.99\") (03/10 1238)  Weight: 86.2 kg (190 lb) (03/10 1238)  BSA (Calculated - sq m): 1.89 sq meters (03/10 1238)  Pulse: 61 (03/10 1238)  BP: 169/101 (03/10 1238)  Temp: 97.3 °F (36.3 °C) (03/10 1238)  Do Not Use - Resp Rate: --  SpO2: 98 % (03/10 1238)    PS:  ECO    PHYSICAL EXAM:    General: alert and oriented x 3, not in acute distress.  Accompanied by .  Relates independently.    Neck: No adenopathy.  CVS: S1S2, regular  Rhythm, no murmurs.   Lungs: Clear to auscultation and percussion.  Abdomen: Soft, very mild tenderness.  Extremities:  No edema.  CNS: no focal deficit    Emotional well being: Patient's emotional well being was assessed.  No issues requiring acute psychosocial intervention were identified.     LABS:       Recent Results (from the past 24 hours)   CBC w/Auto Diff    Collection Time: 03/10/25 12:37 PM   Result Value Ref Range    WBC 8.4 4.0 - 11.0 x10(3) uL    RBC 5.06 3.80 - 5.30 x10(6)uL    HGB 15.4 12.0 - 16.0 g/dL    HCT 45.8 35.0 - 48.0 %    .0 150.0 - 450.0 10(3)uL    MCV 90.5 80.0 - 100.0 fL    MCH 30.4 26.0 - 34.0 pg    MCHC 33.6 31.0 - 37.0 g/dL    RDW 14.7 %    Neutrophil Absolute Prelim 5.39 1.50 - 7.70 x10 (3) uL    Neutrophil Absolute 5.39 1.50 - 7.70  x10(3) uL    Lymphocyte Absolute 1.66 1.00 - 4.00 x10(3) uL    Monocyte Absolute 0.68 0.10 - 1.00 x10(3) uL    Eosinophil Absolute 0.57 0.00 - 0.70 x10(3) uL    Basophil Absolute 0.06 0.00 - 0.20 x10(3) uL    Immature Granulocyte Absolute 0.02 0.00 - 1.00 x10(3) uL    Neutrophil % 64.4 %    Lymphocyte % 19.8 %    Monocyte % 8.1 %    Eosinophil % 6.8 %    Basophil % 0.7 %    Immature Granulocyte % 0.2 %         ASSESSMENT AND PLAN:     # CML: diag outside 6/21.  62.49 BCR/ABL transcripts in peripheral blood.  Started bosutinib, MMR 9/22.  Self discontinued bosutinib 11/23.  BCR/ABL transcripts +1/24.  Established with me 5/24.  In 5/24, she continued to show response with 0.17% BCR/ABL transcripts.  Bosutinib restarted held 8/24 for C. difficile, then started 10/24 and held again when she lost her support animal.  Then had again after she was admitted 11/24 for neurologic symptoms.      Now on bosutinib 300 mg daily.  Tolerating without any problems.  BCR/ABL pending.    # Hyponatremia: Resolved.    # Weakness: Seeing neurology.  Being worked up for MS.    ORDERS PLACED:          Procedures    BCR-ABL1, P210 Gene Rearrangement, Quantitative PCR    CBC w/Auto Diff    Comp Metabolic Panel (14)    Comp Metabolic Panel (14)    Ferritin    Iron And Tibc    BCR-ABL1, P210 Gene Rearrangement, Quantitative PCR    CBC With Differential With Platelet         Return in about 3 months (around 6/10/2025) for Labs, MD visit.    Janene Ordonez M.D.    [facility]    [facility] [place]  [address] [place], [place], [address]      3/10/2025

## 2025-03-11 LAB
DEPRECATED HBV CORE AB SER IA-ACNC: 23 NG/ML
IRON SATN MFR SERPL: 25 %
IRON SERPL-MCNC: 92 UG/DL
TOTAL IRON BINDING CAPACITY: 369 UG/DL (ref 250–425)
TRANSFERRIN SERPL-MCNC: 303 MG/DL (ref 250–380)

## 2025-03-12 LAB
BCR-ABL1 INTERPRETATION: DETECTED
NS MOLECULAR RESPONSE VALUE: >4.52

## 2025-03-21 ENCOUNTER — TELEPHONE (OUTPATIENT)
Dept: SURGERY | Facility: CLINIC | Age: 60
End: 2025-03-21

## 2025-03-21 DIAGNOSIS — M54.12 CERVICAL RADICULOPATHY: Primary | ICD-10-CM

## 2025-03-21 RX ORDER — MELOXICAM 7.5 MG/1
7.5 TABLET ORAL DAILY
Qty: 30 TABLET | Refills: 1 | Status: SHIPPED | OUTPATIENT
Start: 2025-03-21

## 2025-03-21 NOTE — TELEPHONE ENCOUNTER
Meloxicam refilled.    Please defer all further refills to PCP, as patient has not been seen in clinic since December 2024 and was advised to f/u with Dr. Camacho in 4-6 weeks.

## 2025-03-21 NOTE — TELEPHONE ENCOUNTER
Medication: MELOXICAM 7.5 MG Oral Tab      Date of last refill: 2/10/25 (#30/1)  [place]      Last office visit: 12/16/24  Due back to clinic per last office note:  4-6 weeks  Date next office visit scheduled:    Future Appointments   Date Time Provider Department Center   4/7/2025 11:20 AM Randa Nina PA [facility]   6/9/2025  1:00 PM Ryann Ordonez MD [facility]           Last OV note recommendation:    \"ASSESSMENT:  Cervical radiculopathy     Plan:  Start Meloxicam  Start PT  F/U in 4-6 weeks\"    Routed to Provider.

## 2025-04-07 ENCOUNTER — OFFICE VISIT (OUTPATIENT)
Dept: NEUROLOGY | Facility: CLINIC | Age: 60
End: 2025-04-07
Payer: MEDICARE

## 2025-04-07 VITALS
RESPIRATION RATE: 16 BRPM | SYSTOLIC BLOOD PRESSURE: 155 MMHG | HEIGHT: 62 IN | DIASTOLIC BLOOD PRESSURE: 84 MMHG | HEART RATE: 68 BPM | WEIGHT: 194 LBS | BODY MASS INDEX: 35.7 KG/M2

## 2025-04-07 DIAGNOSIS — R20.2 PARESTHESIAS: ICD-10-CM

## 2025-04-07 DIAGNOSIS — R90.89 ABNORMAL FINDING ON MRI OF BRAIN: Primary | ICD-10-CM

## 2025-04-07 PROCEDURE — 99203 OFFICE O/P NEW LOW 30 MIN: CPT | Performed by: PHYSICIAN ASSISTANT

## 2025-04-07 RX ORDER — CARVEDILOL 12.5 MG/1
12.5 TABLET ORAL 2 TIMES DAILY WITH MEALS
COMMUNITY

## 2025-04-07 NOTE — PATIENT INSTRUCTIONS
Refill policies:    Allow 2-3 business days for refills; controlled substances may take longer.  Contact your pharmacy at least 5 days prior to running out of medication and have them send an electronic request or submit request through the “request refill” option in your [employer] account.  Refills are not addressed on weekends; covering physicians do not authorize routine medications on weekends.  No narcotics or controlled substances are refilled after noon on Fridays or by on call physicians.  By law, narcotics must be electronically prescribed.  A 30 day supply with no refills is the maximum allowed.  If your prescription is due for a refill, you may be due for a follow up appointment.  To best provide you care, patients receiving routine medications need to be seen at least once a year.  Patients receiving narcotic/controlled substance medications need to be seen at least once every 3 months.  In the event that your preferred pharmacy does not have the requested medication in stock (e.g. Backordered), it is your responsibility to find another pharmacy that has the requested medication available.  We will gladly send a new prescription to that pharmacy at your request.    Scheduling Tests:    If your physician has ordered radiology tests such as MRI or CT scans, please contact Central Scheduling at [phone] right away to schedule the test.  Once scheduled, the [facility] Centralized Referral Team will work with your insurance carrier to obtain pre-certification or prior authorization.  Depending on your insurance carrier, approval may take 3-10 days.  It is highly recommended patients assure they have received an authorization before having a test performed.  If test is done without insurance authorization, patient may be responsible for the entire amount billed.      Precertification and Prior Authorizations:  If your physician has recommended that you have a procedure or additional testing performed the [facility]  Centralized Referral Team will contact your insurance carrier to obtain pre-certification or prior authorization.    You are strongly encouraged to contact your insurance carrier to verify that your procedure/test has been approved and is a COVERED benefit.  Although the [facility] Centralized Referral Team does its due diligence, the insurance carrier gives the disclaimer that \"Although the procedure is authorized, this does not guarantee payment.\"    Ultimately the patient is responsible for payment.   Thank you for your understanding in this matter.  Paperwork Completion:  If you require FMLA or disability paperwork for your recovery, please make sure to either drop it off or have it faxed to our office at [phone]. Be sure the form has your name and date of birth on it.  The form will be faxed to our Forms Department and they will complete it for you.  There is a 25$ fee for all forms that need to be filled out.  Please be aware there is a 10-14 day turnaround time.  You will need to sign a release of information (MORGAN) form if your paperwork does not come with one.  You may call the Forms Department with any questions at [phone].  Their fax number is [phone].     no

## 2025-04-07 NOTE — PROGRESS NOTES
NEUROLOGY  [facility]       Dyana Medeirosley  5/18/1965  Primary Care Provider:  Mag Jeffers MD    4/7/2025  59 year old female,      Seen for/plans last visit:  Follow-up from the Hospital      Present condition:    Patient amd  state that after Thanksgiving Dinner 2024 she went to the ER with left sided weakness. There was an associated headache,. She had MRIs and LP done to evaluate. There was no explanation for her symptoms.    She informs me that she also has hx of left facial numbness and left arm numbness September 2022. At that time she had CT Brain and MRI Brain without any acute findings and was continued on ASA daily. She was driving at the time of these symptoms and has not driven since. She has struggled to get her blood pressure under control. S  Brother has lupus anticoagulant.   She has been tested and was negative  Blurry vision both eyes. Has seen eye doctor and got new glasses.   Typically present in the morning. It does get better later in the day    Patient with paresthesias in hands and feet    2021 was diagnosed with CML    LP  Comment: Zero (0) oligoclonal bands were observed in the CSF. However greater  than five (>5) paired bands were observed in both the CSF and serum.  Paired bands suggest an immune response to an inflammatory process  outside the CNS and are unlikely to represent a CNS demyelinating  disease.     MRI Thoracic/Lumbar Spine(11/29/24)    Impression   CONCLUSION:       1. No signal abnormality in the thoracic spinal cord.  No significant spinal canal or neural foraminal stenosis in the thoracic spine.  No enhancing lesions in the thoracic spine.     2. No high-grade spinal canal or neural foraminal stenosis in the lumbar spine.  No enhancing lesions are identified.            MRI Cervical Spine(11/29/24)        Impression   CONCLUSION:       1. No high-grade spinal canal stenosis.     2. Right paracentral posterior disc osteophyte complex at  C5-C6 is causing severe right neural foraminal stenosis.            MRI Brain(11/28/24)  Impression   CONCLUSION:    1. There is no evidence of acute ischemia, hemorrhage or mass.  2. Atypical T2 hyperintensity noted symmetrically and bilaterally and periaqueductal region on image described above can be associated with thiamine deficiency or other moderate polyp abnormalities.  Correlation with any known clinical history is  necessary.  3. There is chronic small vessel ischemic change noted.         CTA Head/Neck(11/29/24)        Impression   CONCLUSION:    1. Intracranial CT angiogram demonstrates no large branch occlusion or aneurysm.  2. CT angiogram of neck demonstrates calcified plaque in proximal internal carotid arteries bilaterally that causes approximately 40% stenosis bilaterally.  3. Focal decreased attenuation right basal ganglia is most likely the result of chronic small vessel ischemic change.  If there is clinical evidence of acute ischemia then further evaluation with MRI is recommended.        Component      Latest Ref Rng 2/13/2025 4/10/2025   IMMUNOGLOBULIN G CSF      0.0 - 6.7 mg/dL 1.8     Albumin, CSF      8 - 37 mg/dL 16     Albumin      3.8 - 4.9 g/dL 4.1     IMMUNOGLOBULIN G      586 - 1602 mg/dL 1016     CSF IGG/ALBUMIN RATIO      0.00 - 0.25  0.11     IGG INDEX      0.0 - 0.7  0.5     CSF IGG SYNTHESIS RATE      -9.9 TO +3.3 mg/day -3.8     Oligoclonal Interp Comment     CSF/Serum Albumin Index      0 - 8  4     TOTAL VOLUME CSF      mL 8.0     CSF TUBE # 4     Color CSF      Colorless  Colorless     Clarity CSF      Clear  Clear     WBC CSF      0 - 5 /mm3 6 (H)     RBC CSF      <1 /mm3 2,000 (H)     Neutrophils CSF      % 73     Lymphocytes CSF      % 22     Mono/Macrophages CSF      % 5     EOSINOPHILS CSF      % 0     Basophil CSF      % 0     TOTAL CELLS COUNTED 100     Expanded MICKEY Antibody Screen, IGG      <0.7 ug/l  0.30    Anti-dsDNA antibody      <10 IU/mL  1.9    Connective  Tissue Disease Screen Interpretation      Negative   Negative    Total Protein CSF      15.0 - 45.0 mg/dL 30.7     Glucose CSF      40 - 70 mg/dL 63          Review of Systems:  Review of Systems:  Denies systemic symptoms     No CP or SOB.  No GI or  symptoms. Relevant Neuro as noted above.      Medications:      Current Outpatient Medications:     carvedilol 12.5 MG Oral Tab, Take 1 tablet (12.5 mg total) by mouth 2 (two) times daily with meals., Disp: , Rfl:     zolpidem 10 MG Oral Tab, every 28 days. FOR 21 DAYS IN, THEN REMOVE FOR 7 DAYS, Disp: , Rfl:     BOSULIF 100 MG Oral Tab, Take 3 tablets by mouth daily. Pt taking 300mg, Disp: , Rfl:     cyclobenzaprine 10 MG Oral Tab, Take 1 tablet (10 mg total) by mouth 3 (three) times daily as needed for Muscle spasms., Disp: 30 tablet, Rfl: 0    thiamine 100 MG Oral Tab, Take 1 tablet (100 mg total) by mouth daily., Disp: 30 tablet, Rfl: 0    famotidine 20 MG Oral Tab, Take 1 tablet (20 mg total) by mouth 2 (two) times daily., Disp: , Rfl:     ondansetron (ZOFRAN) 8 MG tablet, Take 1 tablet (8 mg total) by mouth every 8 (eight) hours as needed for Nausea., Disp: 30 tablet, Rfl: 3    LISINOPRIL 20 MG Oral Tab, Take 1 tablet (20 mg total) by mouth 2 (two) times daily., Disp: 60 tablet, Rfl: 0    FLUoxetine HCl 20 MG Oral Tab, Take 1 tablet (20 mg total) by mouth daily., Disp: , Rfl:     atorvastatin 80 MG Oral Tab, Take 1 tablet (80 mg total) by mouth nightly., Disp: , Rfl:     aspirin 81 MG Oral Tab EC, Take 1 tablet (81 mg total) by mouth daily., Disp: , Rfl:     NON FORMULARY, Medical cannabis as needed., Disp: , Rfl:     HYDROcodone-acetaminophen 5-325 MG Oral Tab, Take 1-2 tablets by mouth 3 (three) times daily as needed., Disp: 30 tablet, Rfl: 0    clonazePAM 1 MG Oral Tab, Take 1 tablet (1 mg total) by mouth 2 (two) times daily., Disp: 60 tablet, Rfl: 1    Mometasone Furoate 0.1 % External Cream, Apply 1 Application topically daily., Disp: 60 g, Rfl: 3     POTASSIUM CHLORIDE ER 20 MEQ Oral Tab CR, TAKE 1 TABLET BY MOUTH DAILY, Disp: 90 tablet, Rfl: 11  PRN:     Allergies:  Allergies[1]       EXAM:  /84 (BP Location: Left arm, Patient Position: Sitting, Cuff Size: adult)   Pulse 68   Resp 16   Ht 62\"   Wt 194 lb (88 kg)   LMP  (LMP Unknown)   BMI 35.48 kg/m²   Looks stated age  General Exam:  HENT:  pink conjunctiva anicteric sclerae  Neck no adenopathy, thyroid normal  Heart and Lungs:  normal  Extremities: no cyanosis, skin changes    NEURO  NAD, A&Ox3  EOMI  CN II-XII intact  Strength 5/5 all extremities  DTRs absent achilles bilaterally  FTN intact bilaterally  No drift on exam  Normal gait          Images were reviewed with Dr. Carrasquillo    Problem/s Identified this visit:   1. Abnormal finding on MRI of brain    2. Paresthesias          Discussion plus Diagnostics & Treatment Orders:    Patient is a 59 year old female here for follow-up from the hospital for left sided weakness, blurry vision, brain fog and headache. Work-up in the hospital included MRI Brain which showed abnormalities in periaqueductal gray. She was started on thiamine. We will get connective tissue panel, neurofilament, kappa free light chains and gfap. EMG ordered to evaluate for neuropathy. Could possibly been a  migraine?      (X) Discussed potential side effects of any treatment relevant to above.  Includes explanation of tests as necessary.    Return for EMG.      Patient understands that if needed, based on condition and or test results, follow up will be readjusted    Randa Nina PA-C  [facility]  4/7/2025, Time completed 12:08 PM                 [1]   Allergies  Allergen Reactions    Amlodipine ANAPHYLAXIS    Clopidogrel WHEEZING and RASH    Metoclopramide HIVES and OTHER (SEE COMMENTS)     Cerner Allergy Text Annotation: Reglan, Cerner Reaction: hyper  Cerner Allergy Text Annotation: Reglan, Cerner Reaction: hyper      Mirtazapine Coughing,  PALPITATIONS, Runny nose and SWELLING    Tramadol UNKNOWN    Ciprofloxacin NAUSEA AND VOMITING and OTHER (SEE COMMENTS)     Cerner Allergy Text Annotation: ciprofloxacin, Cerner Reaction: vomit      Niacin And Related UNKNOWN     Other reaction(s): Rash    Opioid Analgesics OTHER (SEE COMMENTS), UNKNOWN and NAUSEA AND VOMITING     Per pt  Cerner Allergy Text Annotation: morphine, Cerner Reaction: chest pain      Sulfamethoxazole W/Trimethoprim NAUSEA AND VOMITING    Amoxicillin-Pot Clavulanate OTHER (SEE COMMENTS)     Upset her stomach with Augmentin.  Patient took amoxicillin without problem.    Gabapentin OTHER (SEE COMMENTS)    Meloxicam OTHER (SEE COMMENTS)     Increases BP    Sertraline UNKNOWN     Cerner Allergy Text Annotation: Zoloft      Trazodone UNKNOWN    Sulfa Antibiotics OTHER (SEE COMMENTS) and RASH     Cerner Allergy Text Annotation: sulfa drugs, Cerner Reaction: hives

## 2025-04-10 ENCOUNTER — LAB ENCOUNTER (OUTPATIENT)
Dept: LAB | Age: 60
End: 2025-04-10
Attending: PHYSICIAN ASSISTANT
Payer: MEDICARE

## 2025-04-10 DIAGNOSIS — R90.89 ABNORMAL FINDING ON MRI OF BRAIN: ICD-10-CM

## 2025-04-10 LAB
CRP SERPL-MCNC: <0.4 MG/DL (ref ?–0.5)
ERYTHROCYTE [SEDIMENTATION RATE] IN BLOOD: 46 MM/HR (ref 0–30)
RHEUMATOID FACT SERPL-ACNC: <3.5 IU/ML (ref ?–14)

## 2025-04-10 PROCEDURE — 86225 DNA ANTIBODY NATIVE: CPT

## 2025-04-10 PROCEDURE — 85652 RBC SED RATE AUTOMATED: CPT | Performed by: PHYSICIAN ASSISTANT

## 2025-04-10 PROCEDURE — 36415 COLL VENOUS BLD VENIPUNCTURE: CPT | Performed by: PHYSICIAN ASSISTANT

## 2025-04-10 PROCEDURE — 86038 ANTINUCLEAR ANTIBODIES: CPT

## 2025-04-10 PROCEDURE — 83521 IG LIGHT CHAINS FREE EACH: CPT

## 2025-04-10 PROCEDURE — 86140 C-REACTIVE PROTEIN: CPT | Performed by: PHYSICIAN ASSISTANT

## 2025-04-10 PROCEDURE — 86431 RHEUMATOID FACTOR QUANT: CPT | Performed by: PHYSICIAN ASSISTANT

## 2025-04-10 PROCEDURE — 86618 LYME DISEASE ANTIBODY: CPT | Performed by: PHYSICIAN ASSISTANT

## 2025-04-11 LAB
B BURGDOR IGG+IGM SER QL: NEGATIVE
DSDNA IGG SERPL IA-ACNC: 1.9 IU/ML (ref ?–10)
ENA AB SER QL IA: 0.3 UG/L (ref ?–0.7)
ENA AB SER QL IA: NEGATIVE

## 2025-04-14 ENCOUNTER — PATIENT MESSAGE (OUTPATIENT)
Dept: NEUROLOGY | Facility: CLINIC | Age: 60
End: 2025-04-14

## 2025-04-14 LAB
KAPPA LC FREE SER-MCNC: 2.53 MG/DL (ref 0.33–1.94)
KAPPA LC FREE/LAMBDA FREE SER NEPH: 1.5 {RATIO} (ref 0.26–1.65)
LAMBDA LC FREE SERPL-MCNC: 1.69 MG/DL (ref 0.57–2.63)

## 2025-04-24 NOTE — TELEPHONE ENCOUNTER
Spoke to Dyana and informed her that the kappa free light chains were elevated which tells us there is inflammation occurring. Recommended she proceed with EMG. She expressed full understanding

## 2025-05-08 ENCOUNTER — PROCEDURE VISIT (OUTPATIENT)
Dept: NEUROLOGY | Facility: CLINIC | Age: 60
End: 2025-05-08
Payer: MEDICARE

## 2025-05-08 DIAGNOSIS — R20.2 PARESTHESIAS: Primary | ICD-10-CM

## 2025-05-08 NOTE — PROGRESS NOTES
[facility]  [address] [place] [address]  [place] [address]   [phone]        Full Name: Dyana Souza Gender: Female  Patient ID: [id number] YOB: 1965      Visit Date: 5/8/2025 9:58 AM  Age: 59 Years  Examining Physician: Miko Carrasquillo  Referring Physician: Randa Nina  Height: 5 feet 2 inch  Weight: 194 lbs  History: Parethesias      Sensory NCS      Nerve / Sites Rec. Site Onset Lat Peak Lat NP Amp PP Amp Segments Distance Peak Diff Velocity Comment     ms ms µV µV  cm ms m/s    L Sural - (Antidromic)      Calf Ankle 3.18 3.80 0.15 16.3 Calf - Ankle 14  44    R Median, Ulnar - Transcarpal comparison      Median Palm Wrist 1.46 1.93 59.2 40.9 Median Palm - Wrist 8  55       Ulnar Palm Wrist 1.30 1.82 26.7 26.7 Ulnar Palm - Wrist 8  61          Median Palm - Ulnar Palm  0.10     L Median, Ulnar - Transcarpal comparison      Median Palm Wrist 1.51 1.98 76.6 62.3 Median Palm - Wrist 8  53       Ulnar Palm Wrist 1.41 1.72 5.8 33.3 Ulnar Palm - Wrist 8  57          Median Palm - Ulnar Palm  0.26         Motor NCS      Nerve / Sites Muscle Latency Amplitude Segments Dist. Lat Diff Velocity Comments     ms mV  cm ms m/s    R Median - APB      Wrist APB 3.02 9.8 Wrist - APB 8         Elbow APB 6.38 11.0 Elbow - Wrist 19.5 3.35 58.1    L Median - APB      Wrist APB 2.98 9.1 Wrist - APB 8         Elbow APB 6.54 8.4 Elbow - Wrist 20 3.56 56.1    R Ulnar - ADM      Wrist ADM 2.67 8.6 Wrist - ADM 8         B.Elbow ADM 5.60 8.4 B.Elbow - Wrist 18 2.94 61.3       A.Elbow ADM 7.71 8.5 A.Elbow - B.Elbow 12 2.10 57.0    L Ulnar - ADM      Wrist ADM 2.52 8.5 Wrist - ADM 8         B.Elbow ADM 5.52 9.3 B.Elbow - Wrist 18 3.00 60.0       A.Elbow ADM 7.44 4.1 A.Elbow - B.Elbow 12 1.92 62.6    R Peroneal - EDB      Ankle EDB 4.00 4.0 Ankle - EDB 8         B. Fib Head EDB 9.88 3.7 B. Fib Head - Ankle 28 5.88 47.7       A. Fib Head EDB 11.54 3.7 A. Fib Head - B. Fib Head 11 1.67  66.0    L Peroneal - EDB      Ankle EDB 4.38 3.3 Ankle - EDB 8         B. Fib Head EDB 10.63 3.0 B. Fib Head - Ankle 27.5 6.25 44.0    R Tibial - AH      Ankle AH 4.19 15.4 Ankle - AH 8         Knee AH 11.63 3.5 Knee - Ankle 34 7.44 45.7    L Tibial - AH      Ankle AH 5.54 3.7 Ankle - AH 8         Knee AH 12.04 3.8 Knee - Ankle 34 6.50 52.3        F  Wave      Nerve M Latency F Latency F-M Lat    ms ms ms   R Median - APB 3.3 24.1 20.8   R Tibial - AH 5.1 50.3 45.2   L Median - APB 3.1 24.8 21.7       EMG Summary Table     Spontaneous MUAP Recruitment   Muscle IA Fib PSW Fasc H.F. Amp Dur. PPP Pattern   L. Deltoid N None None None None N N N N   L. Triceps brachii N None None None None N N N N   L. Biceps brachii N None None None None N N N N   L. Brachioradialis N None None None None N N N N   L. Pronator teres N None None None None N N N N   L. First dorsal interosseous N None None None None N N N N   L. Quadriceps N None None None None N N N N   L. Adductor longus N None None None None N N N N   L. Tibialis anterior N None None None None N N N N   L. Extensor hallucis longus N None None None None N N N N   L. Peroneus longus N None None None None N N N N   L. Gastrocnemius N None None None None N N N N         Dyana Souza [id number] 5/8/2025 9:58 AM     4 of 4  RESULTS:  Left sural sensory conduction studies were normal.  Bilateral median and ulnar transcarpal sensory studies were likewise within normal limits.  Bilateral median and ulnar motor conduction studies were normal.  Bilateral tibial and peroneal motor conduction studies were normal.  Electromyographic examination of the left upper and left lower extremities showed normal insertional activity, no spontaneous discharges, full recruitment and normal motor units firing.    Conclusion:   No evidence of any large fiber neuropathic or radicular process to explain patient's paresthesias.        Miko Souza [id number] 5/8/2025 9:58  AM     4 of 4

## 2025-05-08 NOTE — PATIENT INSTRUCTIONS
Refill policies:    Allow 2-3 business days for refills; controlled substances may take longer.  Contact your pharmacy at least 5 days prior to running out of medication and have them send an electronic request or submit request through the “request refill” option in your [employer] account.  Refills are not addressed on weekends; covering physicians do not authorize routine medications on weekends.  No narcotics or controlled substances are refilled after noon on Fridays or by on call physicians.  By law, narcotics must be electronically prescribed.  A 30 day supply with no refills is the maximum allowed.  If your prescription is due for a refill, you may be due for a follow up appointment.  To best provide you care, patients receiving routine medications need to be seen at least once a year.  Patients receiving narcotic/controlled substance medications need to be seen at least once every 3 months.  In the event that your preferred pharmacy does not have the requested medication in stock (e.g. Backordered), it is your responsibility to find another pharmacy that has the requested medication available.  We will gladly send a new prescription to that pharmacy at your request.    Scheduling Tests:    If your physician has ordered radiology tests such as MRI or CT scans, please contact Central Scheduling at [phone] right away to schedule the test.  Once scheduled, the [facility] Centralized Referral Team will work with your insurance carrier to obtain pre-certification or prior authorization.  Depending on your insurance carrier, approval may take 3-10 days.  It is highly recommended patients assure they have received an authorization before having a test performed.  If test is done without insurance authorization, patient may be responsible for the entire amount billed.      Precertification and Prior Authorizations:  If your physician has recommended that you have a procedure or additional testing performed the [facility]  Centralized Referral Team will contact your insurance carrier to obtain pre-certification or prior authorization.    You are strongly encouraged to contact your insurance carrier to verify that your procedure/test has been approved and is a COVERED benefit.  Although the [facility] Centralized Referral Team does its due diligence, the insurance carrier gives the disclaimer that \"Although the procedure is authorized, this does not guarantee payment.\"    Ultimately the patient is responsible for payment.   Thank you for your understanding in this matter.  Paperwork Completion:  If you require FMLA or disability paperwork for your recovery, please make sure to either drop it off or have it faxed to our office at [phone]. Be sure the form has your name and date of birth on it.  The form will be faxed to our Forms Department and they will complete it for you.  There is a 25$ fee for all forms that need to be filled out.  Please be aware there is a 10-14 day turnaround time.  You will need to sign a release of information (MORGAN) form if your paperwork does not come with one.  You may call the Forms Department with any questions at [phone].  Their fax number is [phone].

## 2025-06-04 NOTE — PROGRESS NOTES
Cancer Center Progress Note      Patient Name:  Dyana Souza  YOB: 1965  Medical Record Number:  [id number]    Date of visit:  2025    CHIEF COMPLAINT: CML    ONCOLOGY HISTORY:    -CML diagnosed at [facility]  Bosutinib started.  MMR .  Bosutinib held - for bradycardia which was attributed to Coreg.   bosutinib held for C. difficile diarrhea and hyponatremia.  Held  for hospital admission.    HPI:     60 year old that I have followed since .  CLL diag , followed at [facility].  Initial labs showed 62.49% BCR/ABL transcripts.  Started bosutinib.  MMR .  In , bosutinib was held for bradycardia which was then attributed to Coreg.  Went off bosutinib.  BCR ABL+ .  Bosutinib was restarted.  Held  when she was diag with C. difficile and had diarrhea.  Admitted  for profound hyponatremia that was corrected.  Received medication 10/24 but held again a few days back when her pet .  Then resumed .  Another admission  for left-sided weakness.  Previous history of CVA.      Admitted  for chest pain and MS change. W/u neg. Bosutinib reduced to 300 mg.  Tolerating it well.  Diarrhea is controlled.  Gaining weight.     SOCIAL HISTORY:    , homemaker, no children.    ROS:     Constitutional: no fever, chills fatigue,night sweats or weight loss  Neurologic: no headache, seizures, diplopia or weakness  Respiratory: no cough, SOB or hemoptysis  Cardiovascular: no chest pain, ankle swelling, DE LA ROSA  GI: no nausea, vomiting, diarrhea or BRBPR  Musculoskeletal: no body-ache or joint pain  Dermatologic: no alopecia, rash, pruritis  : no hematuria, dysuria or frequency  Psych: no confusion or depression   Heme: no easy bruising or bleeding     ALLERGIES:    Allergies   Allergen Reactions    Amlodipine ANAPHYLAXIS    Clopidogrel WHEEZING and RASH    Metoclopramide HIVES and OTHER (SEE COMMENTS)     Lisa Allergy Text Annotation: Reglan, Cerner  Reaction: hyper  Cerner Allergy Text Annotation: Reglan, Cerner Reaction: hyper      Mirtazapine Coughing, PALPITATIONS, Runny nose and SWELLING    Tramadol UNKNOWN    Ciprofloxacin NAUSEA AND VOMITING and OTHER (SEE COMMENTS)     Cerner Allergy Text Annotation: ciprofloxacin, Cerner Reaction: vomit      Niacin And Related UNKNOWN     Other reaction(s): Rash    Opioid Analgesics OTHER (SEE COMMENTS), UNKNOWN and NAUSEA AND VOMITING     Per pt  Cerner Allergy Text Annotation: morphine, Cerner Reaction: chest pain      Sulfamethoxazole W/Trimethoprim NAUSEA AND VOMITING    Amoxicillin-Pot Clavulanate OTHER (SEE COMMENTS)     Upset her stomach with Augmentin.  Patient took amoxicillin without problem.    Gabapentin OTHER (SEE COMMENTS)    Meloxicam OTHER (SEE COMMENTS)     Increases BP    Sertraline UNKNOWN     Cerner Allergy Text Annotation: Zoloft      Trazodone UNKNOWN    Sulfa Antibiotics OTHER (SEE COMMENTS) and RASH     Cerner Allergy Text Annotation: sulfa drugs, Cerner Reaction: hives         MEDICATIONS:    Current Outpatient Medications   Medication Sig Dispense Refill    carvedilol 12.5 MG Oral Tab Take 1 tablet (12.5 mg total) by mouth in the morning and 1 tablet (12.5 mg total) in the evening. Take with meals.      zolpidem 10 MG Oral Tab every 28 days. FOR 21 DAYS IN, THEN REMOVE FOR 7 DAYS      BOSULIF 100 MG Oral Tab Take 3 tablets by mouth in the morning. Pt taking 300mg .      cyclobenzaprine 10 MG Oral Tab Take 1 tablet (10 mg total) by mouth 3 (three) times daily as needed for Muscle spasms. 30 tablet 0    thiamine 100 MG Oral Tab Take 1 tablet (100 mg total) by mouth daily. 30 tablet 0    famotidine 20 MG Oral Tab Take 1 tablet (20 mg total) by mouth in the morning and 1 tablet (20 mg total) before bedtime.      ondansetron (ZOFRAN) 8 MG tablet Take 1 tablet (8 mg total) by mouth every 8 (eight) hours as needed for Nausea. 30 tablet 3    LISINOPRIL 20 MG Oral Tab Take 1 tablet (20 mg total) by  mouth 2 (two) times daily. (Patient taking differently: Take 2 tablets (40 mg total) by mouth in the morning and 2 tablets (40 mg total) before bedtime.) 60 tablet 0    FLUoxetine HCl 20 MG Oral Tab Take 1 tablet (20 mg total) by mouth in the morning.      atorvastatin 80 MG Oral Tab Take 1 tablet (80 mg total) by mouth nightly.      aspirin 81 MG Oral Tab EC Take 1 tablet (81 mg total) by mouth in the morning.      NON FORMULARY Medical cannabis as needed.      HYDROcodone-acetaminophen 5-325 MG Oral Tab Take 1-2 tablets by mouth 3 (three) times daily as needed. 30 tablet 0    clonazePAM 1 MG Oral Tab Take 1 tablet (1 mg total) by mouth 2 (two) times daily. 60 tablet 1    Mometasone Furoate 0.1 % External Cream Apply 1 Application topically daily. 60 g 3    POTASSIUM CHLORIDE ER 20 MEQ Oral Tab CR TAKE 1 TABLET BY MOUTH DAILY 90 tablet 11       VITALS:  Height: 160 cm (5' 2.99\") (1245)  Weight: 93 kg (205 lb) (1245)  BSA (Calculated - sq m): 1.95 sq meters (1245)  Pulse: 65 (1245)  BP: 146/92 (1245)  Temp: 96.9 °F (36.1 °C) (1245)  Do Not Use - Resp Rate: --  SpO2: 99 % (1245)    PS:  ECO    PHYSICAL EXAM:    General: alert and oriented x 3, not in acute distress.  Accompanied by .    Neck: No adenopathy.  CVS: S1S2, regular  Rhythm, no murmurs.   Lungs: Clear to auscultation and percussion.  Abdomen: Soft, very mild tenderness.  Extremities:  No edema.  CNS: no focal deficit    Emotional well being: Patient's emotional well being was assessed.  No issues requiring acute psychosocial intervention were identified.     LABS:       Recent Results (from the past 24 hours)   Comp Metabolic Panel (14)    Collection Time: 25 12:48 PM   Result Value Ref Range    Glucose 102 (H) 70 - 99 mg/dL    Sodium 138 136 - 145 mmol/L    Potassium 4.6 3.5 - 5.1 mmol/L    Chloride 103 98 - 112 mmol/L    CO2 29.0 21.0 - 32.0 mmol/L    Anion Gap 6 0 - 18 mmol/L    BUN 9 9 - 23  mg/dL    Creatinine 0.78 0.55 - 1.02 mg/dL    Calcium, Total 9.4 8.7 - 10.6 mg/dL    Calculated Osmolality 285 275 - 295 mOsm/kg    eGFR-Cr 87 >=60 mL/min/1.73m2    AST 36 (H) <34 U/L    ALT 34 10 - 49 U/L    Alkaline Phosphatase 79 46 - 118 U/L    Bilirubin, Total 0.3 0.2 - 1.1 mg/dL    Total Protein 7.4 5.7 - 8.2 g/dL    Albumin 4.5 3.2 - 4.8 g/dL    Globulin  2.9 2.0 - 3.5 g/dL    A/G Ratio 1.6 1.0 - 2.0    Patient Fasting for CMP? Yes    CBC With Differential With Platelet    Collection Time: 06/09/25 12:48 PM   Result Value Ref Range    WBC 7.7 4.0 - 11.0 x10(3) uL    RBC 4.69 3.80 - 5.30 x10(6)uL    HGB 14.1 12.0 - 16.0 g/dL    HCT 43.0 35.0 - 48.0 %    .0 150.0 - 450.0 10(3)uL    MCV 91.7 80.0 - 100.0 fL    MCH 30.1 26.0 - 34.0 pg    MCHC 32.8 31.0 - 37.0 g/dL    RDW 15.1 %    Neutrophil Absolute Prelim 4.98 1.50 - 7.70 x10 (3) uL    Neutrophil Absolute 4.98 1.50 - 7.70 x10(3) uL    Lymphocyte Absolute 1.56 1.00 - 4.00 x10(3) uL    Monocyte Absolute 0.75 0.10 - 1.00 x10(3) uL    Eosinophil Absolute 0.36 0.00 - 0.70 x10(3) uL    Basophil Absolute 0.03 0.00 - 0.20 x10(3) uL    Immature Granulocyte Absolute 0.03 0.00 - 1.00 x10(3) uL    Neutrophil % 64.6 %    Lymphocyte % 20.2 %    Monocyte % 9.7 %    Eosinophil % 4.7 %    Basophil % 0.4 %    Immature Granulocyte % 0.4 %         ASSESSMENT AND PLAN:     # CML: diag outside 6/21.  62.49 BCR/ABL transcripts in peripheral blood.  Started bosutinib, MMR since 9/22.  Self discontinued bosutinib 11/23.  BCR/ABL transcripts +1/24.  Established with me 5/24. Bosutinib restarted held 8/24 for C. difficile, then started 10/24 and held again when she lost her support animal.  Then held again when admitted 11/24 for neurologic symptoms.      Now on bosutinib 300 mg daily.  Tolerating without any problems.  BCR/ABL 3/25 was barely detectable at < 0.003%.  Continue current regimen for now.    # Hyponatremia: Resolved.    # Weakness: Seeing neurology.  EMG 5/25 did not  show any large fiber neuropathic or radicular process to explain her paresthesias.  Not likely to be related to bosutinib.    ORDERS PLACED:          Procedures    CBC With Differential With Platelet    BCR-ABL1, P210 Gene Rearrangement, Quantitative PCR    Iron And Tibc    Ferritin    Comp Metabolic Panel (14)       Return in about 4 months (around 10/9/2025) for Labs, MD visit.    Janene Ordonez M.D.    [facility]    [facility] [place]  [address] [place], [place], [address]      6/9/2025

## 2025-06-09 ENCOUNTER — OFFICE VISIT (OUTPATIENT)
Age: 60
End: 2025-06-09
Attending: INTERNAL MEDICINE
Payer: MEDICARE

## 2025-06-09 VITALS
SYSTOLIC BLOOD PRESSURE: 146 MMHG | BODY MASS INDEX: 36.32 KG/M2 | TEMPERATURE: 97 F | RESPIRATION RATE: 18 BRPM | HEART RATE: 65 BPM | HEIGHT: 62.99 IN | DIASTOLIC BLOOD PRESSURE: 92 MMHG | WEIGHT: 205 LBS | OXYGEN SATURATION: 99 %

## 2025-06-09 DIAGNOSIS — D64.9 ANEMIA, UNSPECIFIED TYPE: ICD-10-CM

## 2025-06-09 DIAGNOSIS — T50.905D MEDICATION ADVERSE EFFECT, SUBSEQUENT ENCOUNTER: ICD-10-CM

## 2025-06-09 DIAGNOSIS — C92.10 CML (CHRONIC MYELOID LEUKEMIA) (HCC): ICD-10-CM

## 2025-06-09 DIAGNOSIS — R19.7 DIARRHEA, UNSPECIFIED TYPE: Primary | ICD-10-CM

## 2025-06-09 PROBLEM — E61.1 IRON DEFICIENCY: Status: ACTIVE | Noted: 2025-06-09

## 2025-06-09 LAB
ALBUMIN SERPL-MCNC: 4.5 G/DL (ref 3.2–4.8)
ALBUMIN/GLOB SERPL: 1.6 {RATIO} (ref 1–2)
ALP LIVER SERPL-CCNC: 79 U/L (ref 46–118)
ALT SERPL-CCNC: 34 U/L (ref 10–49)
ANION GAP SERPL CALC-SCNC: 6 MMOL/L (ref 0–18)
AST SERPL-CCNC: 36 U/L (ref ?–34)
BASOPHILS # BLD AUTO: 0.03 X10(3) UL (ref 0–0.2)
BASOPHILS NFR BLD AUTO: 0.4 %
BILIRUB SERPL-MCNC: 0.3 MG/DL (ref 0.2–1.1)
BUN BLD-MCNC: 9 MG/DL (ref 9–23)
CALCIUM BLD-MCNC: 9.4 MG/DL (ref 8.7–10.6)
CHLORIDE SERPL-SCNC: 103 MMOL/L (ref 98–112)
CO2 SERPL-SCNC: 29 MMOL/L (ref 21–32)
CREAT BLD-MCNC: 0.78 MG/DL (ref 0.55–1.02)
DEPRECATED HBV CORE AB SER IA-ACNC: 17 NG/ML (ref 50–306)
EGFRCR SERPLBLD CKD-EPI 2021: 87 ML/MIN/1.73M2 (ref 60–?)
EOSINOPHIL # BLD AUTO: 0.36 X10(3) UL (ref 0–0.7)
EOSINOPHIL NFR BLD AUTO: 4.7 %
ERYTHROCYTE [DISTWIDTH] IN BLOOD BY AUTOMATED COUNT: 15.1 %
FASTING STATUS PATIENT QL REPORTED: YES
GLOBULIN PLAS-MCNC: 2.9 G/DL (ref 2–3.5)
GLUCOSE BLD-MCNC: 102 MG/DL (ref 70–99)
HCT VFR BLD AUTO: 43 % (ref 35–48)
HGB BLD-MCNC: 14.1 G/DL (ref 12–16)
IMM GRANULOCYTES # BLD AUTO: 0.03 X10(3) UL (ref 0–1)
IMM GRANULOCYTES NFR BLD: 0.4 %
IRON SATN MFR SERPL: 18 % (ref 15–50)
IRON SERPL-MCNC: 69 UG/DL (ref 50–170)
LYMPHOCYTES # BLD AUTO: 1.56 X10(3) UL (ref 1–4)
LYMPHOCYTES NFR BLD AUTO: 20.2 %
MCH RBC QN AUTO: 30.1 PG (ref 26–34)
MCHC RBC AUTO-ENTMCNC: 32.8 G/DL (ref 31–37)
MCV RBC AUTO: 91.7 FL (ref 80–100)
MONOCYTES # BLD AUTO: 0.75 X10(3) UL (ref 0.1–1)
MONOCYTES NFR BLD AUTO: 9.7 %
NEUTROPHILS # BLD AUTO: 4.98 X10 (3) UL (ref 1.5–7.7)
NEUTROPHILS # BLD AUTO: 4.98 X10(3) UL (ref 1.5–7.7)
NEUTROPHILS NFR BLD AUTO: 64.6 %
OSMOLALITY SERPL CALC.SUM OF ELEC: 285 MOSM/KG (ref 275–295)
PLATELET # BLD AUTO: 271 10(3)UL (ref 150–450)
POTASSIUM SERPL-SCNC: 4.6 MMOL/L (ref 3.5–5.1)
PROT SERPL-MCNC: 7.4 G/DL (ref 5.7–8.2)
RBC # BLD AUTO: 4.69 X10(6)UL (ref 3.8–5.3)
SODIUM SERPL-SCNC: 138 MMOL/L (ref 136–145)
TOTAL IRON BINDING CAPACITY: 373 UG/DL (ref 250–425)
TRANSFERRIN SERPL-MCNC: 303 MG/DL (ref 250–380)
WBC # BLD AUTO: 7.7 X10(3) UL (ref 4–11)

## 2025-06-09 NOTE — PROGRESS NOTES
Education Record    Learner:  Patient/ spouse    Disease / Diagnosis:CML  3 month FU    Barriers / Limitations:  None   Comments:    Method:  Discussion   Comments:    General Topics:  Plan of care reviewed   Comments:    Outcome:  Shows understanding   Comments:   Bosulif 300mg daily tolerating well.     Seeing cardiology next week, has self adjusted the lisinopril.   Monitors BP frequently at home.   Stomach upset a few days now, but ate nuts, and thinks it's related to that

## 2025-06-10 ENCOUNTER — TELEPHONE (OUTPATIENT)
Age: 60
End: 2025-06-10

## 2025-06-10 NOTE — TELEPHONE ENCOUNTER
Ryann Ordonez MD  [facility]  Can you please let her know that iron is a bit low.  She may benefit from IV iron.  If she wants to go ahead, okay to schedule once approved    [facility] verbal release reviewed no patient identifier, unable to leave detailed message. [employer] sent.

## 2025-06-10 NOTE — TELEPHONE ENCOUNTER
Patient is returning call and agree with moving forward with infusion. I informed her that she will receive call to schedule once approved.      Please contact for scheduling.

## 2025-06-11 LAB — BCR-ABL1 INTERPRETATION: NOT DETECTED

## 2025-06-23 ENCOUNTER — OFFICE VISIT (OUTPATIENT)
Age: 60
End: 2025-06-23
Attending: INTERNAL MEDICINE
Payer: MEDICARE

## 2025-06-23 VITALS
DIASTOLIC BLOOD PRESSURE: 80 MMHG | BODY MASS INDEX: 37.42 KG/M2 | HEIGHT: 62.99 IN | RESPIRATION RATE: 18 BRPM | WEIGHT: 211.19 LBS | SYSTOLIC BLOOD PRESSURE: 161 MMHG | TEMPERATURE: 98 F | HEART RATE: 64 BPM | OXYGEN SATURATION: 98 %

## 2025-06-23 DIAGNOSIS — E61.1 IRON DEFICIENCY: Primary | ICD-10-CM

## 2025-06-23 NOTE — PROGRESS NOTES
Education Record    Learner:  Patient    Pt here for: IV iron    Barriers / Limitations:  None    Method:  Brief focused, printed material and  reinforcement    General Topics:  Plan of care reviewed    Outcome: Pt tolerated infed infusion with no c/o. Test dose adminsitered with treatment today. Appt in October in place with MD. Left in stable condition.

## 2025-08-19 DIAGNOSIS — C92.10 CML (CHRONIC MYELOID LEUKEMIA) (HCC): Primary | ICD-10-CM

## 2025-08-19 RX ORDER — BOSUTINIB MONOHYDRATE 100 MG/1
3 TABLET, FILM COATED ORAL DAILY
Qty: 120 TABLET | Refills: 2 | Status: SHIPPED | OUTPATIENT
Start: 2025-08-19 | End: 2025-08-22

## 2025-08-22 DIAGNOSIS — C92.10 CML (CHRONIC MYELOID LEUKEMIA) (HCC): ICD-10-CM

## 2025-08-22 RX ORDER — BOSUTINIB MONOHYDRATE 100 MG/1
3 TABLET, FILM COATED ORAL DAILY
Qty: 120 TABLET | Refills: 2 | Status: SHIPPED | OUTPATIENT
Start: 2025-08-22